# Patient Record
Sex: FEMALE | Race: WHITE | Employment: OTHER | URBAN - METROPOLITAN AREA
[De-identification: names, ages, dates, MRNs, and addresses within clinical notes are randomized per-mention and may not be internally consistent; named-entity substitution may affect disease eponyms.]

---

## 2017-01-05 RX ORDER — CLONAZEPAM 1 MG/1
1 TABLET ORAL NIGHTLY PRN
Qty: 90 TABLET | Refills: 0 | Status: SHIPPED | OUTPATIENT
Start: 2017-01-05 | End: 2017-04-07 | Stop reason: SDUPTHER

## 2017-02-10 ENCOUNTER — OFFICE VISIT (OUTPATIENT)
Dept: FAMILY MEDICINE CLINIC | Age: 72
End: 2017-02-10

## 2017-02-10 VITALS
WEIGHT: 199 LBS | OXYGEN SATURATION: 97 % | DIASTOLIC BLOOD PRESSURE: 84 MMHG | HEART RATE: 78 BPM | BODY MASS INDEX: 34.43 KG/M2 | SYSTOLIC BLOOD PRESSURE: 125 MMHG

## 2017-02-10 DIAGNOSIS — M15.9 GENERALIZED OSTEOARTHROSIS, INVOLVING MULTIPLE SITES: ICD-10-CM

## 2017-02-10 DIAGNOSIS — I10 ESSENTIAL HYPERTENSION: Primary | ICD-10-CM

## 2017-02-10 PROCEDURE — 3017F COLORECTAL CA SCREEN DOC REV: CPT | Performed by: FAMILY MEDICINE

## 2017-02-10 PROCEDURE — G8399 PT W/DXA RESULTS DOCUMENT: HCPCS | Performed by: FAMILY MEDICINE

## 2017-02-10 PROCEDURE — G8419 CALC BMI OUT NRM PARAM NOF/U: HCPCS | Performed by: FAMILY MEDICINE

## 2017-02-10 PROCEDURE — 3014F SCREEN MAMMO DOC REV: CPT | Performed by: FAMILY MEDICINE

## 2017-02-10 PROCEDURE — 4040F PNEUMOC VAC/ADMIN/RCVD: CPT | Performed by: FAMILY MEDICINE

## 2017-02-10 PROCEDURE — 1090F PRES/ABSN URINE INCON ASSESS: CPT | Performed by: FAMILY MEDICINE

## 2017-02-10 PROCEDURE — 1036F TOBACCO NON-USER: CPT | Performed by: FAMILY MEDICINE

## 2017-02-10 PROCEDURE — 1123F ACP DISCUSS/DSCN MKR DOCD: CPT | Performed by: FAMILY MEDICINE

## 2017-02-10 PROCEDURE — G8484 FLU IMMUNIZE NO ADMIN: HCPCS | Performed by: FAMILY MEDICINE

## 2017-02-10 PROCEDURE — 99213 OFFICE O/P EST LOW 20 MIN: CPT | Performed by: FAMILY MEDICINE

## 2017-02-10 PROCEDURE — G8427 DOCREV CUR MEDS BY ELIG CLIN: HCPCS | Performed by: FAMILY MEDICINE

## 2017-02-10 ASSESSMENT — ENCOUNTER SYMPTOMS
BACK PAIN: 0
DIARRHEA: 0
ABDOMINAL PAIN: 0
SHORTNESS OF BREATH: 0
CONSTIPATION: 0

## 2017-02-10 ASSESSMENT — PATIENT HEALTH QUESTIONNAIRE - PHQ9
2. FEELING DOWN, DEPRESSED OR HOPELESS: 0
SUM OF ALL RESPONSES TO PHQ9 QUESTIONS 1 & 2: 0
1. LITTLE INTEREST OR PLEASURE IN DOING THINGS: 0
SUM OF ALL RESPONSES TO PHQ QUESTIONS 1-9: 0

## 2017-03-03 LAB
BUN BLDV-MCNC: 16 MG/DL (ref 8–26)
CALCIUM SERPL-MCNC: 10.1 MG/DL (ref 8.8–10.1)
CHLORIDE BLD-SCNC: 102 MEQ/L (ref 101–111)
CO2: 24 MEQ/L (ref 22–29)
CREAT SERPL-MCNC: 0.67 MG/DL (ref 0.44–1.03)
ESTIMATED AVERAGE GLUCOSE: 117 MG/DL
GFR AFRICAN AMERICAN: >60 ML/MIN/1.73 SQ METER
GFR NON-AFRICAN AMERICAN: >60 ML/MIN/1.73 SQ METER
GLUCOSE BLD-MCNC: 96 MG/DL (ref 70–99)
HBA1C MFR BLD: 5.7 % (ref 4–6)
OSMOLALITY CALCULATION: 268 MOSM/KG (ref 280–300)
POTASSIUM SERPL-SCNC: 3.8 MEQ/L (ref 3.6–5.1)
SODIUM BLD-SCNC: 138 MEQ/L (ref 135–145)

## 2017-03-27 ENCOUNTER — TELEPHONE (OUTPATIENT)
Dept: FAMILY MEDICINE CLINIC | Age: 72
End: 2017-03-27

## 2017-03-27 RX ORDER — MELOXICAM 15 MG/1
15 TABLET ORAL DAILY
Qty: 30 TABLET | Refills: 1 | Status: SHIPPED | OUTPATIENT
Start: 2017-03-27 | End: 2017-04-24 | Stop reason: SDUPTHER

## 2017-04-07 RX ORDER — CLONAZEPAM 1 MG/1
1 TABLET ORAL NIGHTLY PRN
Qty: 90 TABLET | Refills: 0 | OUTPATIENT
Start: 2017-04-07 | End: 2017-04-12 | Stop reason: SDUPTHER

## 2017-04-12 RX ORDER — CLONAZEPAM 1 MG/1
1 TABLET ORAL NIGHTLY PRN
Qty: 90 TABLET | Refills: 0 | Status: SHIPPED | OUTPATIENT
Start: 2017-04-12 | End: 2017-07-28 | Stop reason: SDUPTHER

## 2017-04-24 RX ORDER — MELOXICAM 15 MG/1
15 TABLET ORAL DAILY
Qty: 30 TABLET | Refills: 2 | Status: SHIPPED | OUTPATIENT
Start: 2017-04-24 | End: 2017-04-26 | Stop reason: SDUPTHER

## 2017-04-26 ENCOUNTER — TELEPHONE (OUTPATIENT)
Dept: FAMILY MEDICINE CLINIC | Age: 72
End: 2017-04-26

## 2017-04-26 RX ORDER — MELOXICAM 15 MG/1
15 TABLET ORAL DAILY
Qty: 30 TABLET | Refills: 2 | Status: SHIPPED | OUTPATIENT
Start: 2017-04-26 | End: 2017-10-27 | Stop reason: SDUPTHER

## 2017-05-04 LAB
AVERAGE GLUCOSE: NORMAL
HBA1C MFR BLD: 5.6 %

## 2017-05-10 ENCOUNTER — OFFICE VISIT (OUTPATIENT)
Dept: FAMILY MEDICINE CLINIC | Age: 72
End: 2017-05-10

## 2017-05-10 VITALS
HEART RATE: 66 BPM | WEIGHT: 187 LBS | BODY MASS INDEX: 31.92 KG/M2 | DIASTOLIC BLOOD PRESSURE: 82 MMHG | OXYGEN SATURATION: 96 % | SYSTOLIC BLOOD PRESSURE: 128 MMHG | HEIGHT: 64 IN

## 2017-05-10 DIAGNOSIS — Z01.818 PRE-OP EVALUATION: Primary | ICD-10-CM

## 2017-05-10 DIAGNOSIS — M15.9 GENERALIZED OSTEOARTHROSIS, INVOLVING MULTIPLE SITES: ICD-10-CM

## 2017-05-10 DIAGNOSIS — I10 ESSENTIAL HYPERTENSION: ICD-10-CM

## 2017-05-10 DIAGNOSIS — Z85.3 PERSONAL HISTORY OF MALIGNANT NEOPLASM OF BREAST: ICD-10-CM

## 2017-05-10 PROCEDURE — G8399 PT W/DXA RESULTS DOCUMENT: HCPCS | Performed by: FAMILY MEDICINE

## 2017-05-10 PROCEDURE — 1123F ACP DISCUSS/DSCN MKR DOCD: CPT | Performed by: FAMILY MEDICINE

## 2017-05-10 PROCEDURE — 99214 OFFICE O/P EST MOD 30 MIN: CPT | Performed by: FAMILY MEDICINE

## 2017-05-10 PROCEDURE — G8427 DOCREV CUR MEDS BY ELIG CLIN: HCPCS | Performed by: FAMILY MEDICINE

## 2017-05-10 PROCEDURE — G8417 CALC BMI ABV UP PARAM F/U: HCPCS | Performed by: FAMILY MEDICINE

## 2017-05-10 PROCEDURE — 1090F PRES/ABSN URINE INCON ASSESS: CPT | Performed by: FAMILY MEDICINE

## 2017-05-10 PROCEDURE — 3014F SCREEN MAMMO DOC REV: CPT | Performed by: FAMILY MEDICINE

## 2017-05-10 PROCEDURE — 1036F TOBACCO NON-USER: CPT | Performed by: FAMILY MEDICINE

## 2017-05-10 PROCEDURE — 4040F PNEUMOC VAC/ADMIN/RCVD: CPT | Performed by: FAMILY MEDICINE

## 2017-05-10 PROCEDURE — 3017F COLORECTAL CA SCREEN DOC REV: CPT | Performed by: FAMILY MEDICINE

## 2017-05-12 ENCOUNTER — OFFICE VISIT (OUTPATIENT)
Dept: FAMILY MEDICINE CLINIC | Age: 72
End: 2017-05-12

## 2017-05-12 VITALS
TEMPERATURE: 100.1 F | HEART RATE: 72 BPM | WEIGHT: 187 LBS | SYSTOLIC BLOOD PRESSURE: 140 MMHG | OXYGEN SATURATION: 95 % | DIASTOLIC BLOOD PRESSURE: 90 MMHG | BODY MASS INDEX: 32.35 KG/M2

## 2017-05-12 DIAGNOSIS — J06.9 UPPER RESPIRATORY TRACT INFECTION, UNSPECIFIED TYPE: Primary | ICD-10-CM

## 2017-05-12 PROCEDURE — 99213 OFFICE O/P EST LOW 20 MIN: CPT | Performed by: FAMILY MEDICINE

## 2017-05-12 PROCEDURE — 4040F PNEUMOC VAC/ADMIN/RCVD: CPT | Performed by: FAMILY MEDICINE

## 2017-05-12 PROCEDURE — 1036F TOBACCO NON-USER: CPT | Performed by: FAMILY MEDICINE

## 2017-05-12 PROCEDURE — G8417 CALC BMI ABV UP PARAM F/U: HCPCS | Performed by: FAMILY MEDICINE

## 2017-05-12 PROCEDURE — G8427 DOCREV CUR MEDS BY ELIG CLIN: HCPCS | Performed by: FAMILY MEDICINE

## 2017-05-12 PROCEDURE — 1123F ACP DISCUSS/DSCN MKR DOCD: CPT | Performed by: FAMILY MEDICINE

## 2017-05-12 PROCEDURE — 3014F SCREEN MAMMO DOC REV: CPT | Performed by: FAMILY MEDICINE

## 2017-05-12 PROCEDURE — 1090F PRES/ABSN URINE INCON ASSESS: CPT | Performed by: FAMILY MEDICINE

## 2017-05-12 PROCEDURE — G8399 PT W/DXA RESULTS DOCUMENT: HCPCS | Performed by: FAMILY MEDICINE

## 2017-05-12 PROCEDURE — 3017F COLORECTAL CA SCREEN DOC REV: CPT | Performed by: FAMILY MEDICINE

## 2017-05-12 ASSESSMENT — ENCOUNTER SYMPTOMS: COUGH: 1

## 2017-07-10 RX ORDER — ATENOLOL 100 MG/1
TABLET ORAL
Qty: 90 TABLET | Refills: 0 | Status: SHIPPED | OUTPATIENT
Start: 2017-07-10 | End: 2017-10-05 | Stop reason: SDUPTHER

## 2017-07-28 ENCOUNTER — OFFICE VISIT (OUTPATIENT)
Dept: FAMILY MEDICINE CLINIC | Age: 72
End: 2017-07-28

## 2017-07-28 VITALS
WEIGHT: 187 LBS | DIASTOLIC BLOOD PRESSURE: 86 MMHG | OXYGEN SATURATION: 96 % | BODY MASS INDEX: 32.35 KG/M2 | HEART RATE: 68 BPM | SYSTOLIC BLOOD PRESSURE: 126 MMHG

## 2017-07-28 DIAGNOSIS — R53.1 WEAKNESS: Primary | ICD-10-CM

## 2017-07-28 DIAGNOSIS — R53.83 FATIGUE, UNSPECIFIED TYPE: ICD-10-CM

## 2017-07-28 DIAGNOSIS — R29.898 WEAKNESS OF LEFT LEG: ICD-10-CM

## 2017-07-28 DIAGNOSIS — I10 ESSENTIAL HYPERTENSION: ICD-10-CM

## 2017-07-28 PROCEDURE — G8427 DOCREV CUR MEDS BY ELIG CLIN: HCPCS | Performed by: FAMILY MEDICINE

## 2017-07-28 PROCEDURE — G8399 PT W/DXA RESULTS DOCUMENT: HCPCS | Performed by: FAMILY MEDICINE

## 2017-07-28 PROCEDURE — 4040F PNEUMOC VAC/ADMIN/RCVD: CPT | Performed by: FAMILY MEDICINE

## 2017-07-28 PROCEDURE — 1036F TOBACCO NON-USER: CPT | Performed by: FAMILY MEDICINE

## 2017-07-28 PROCEDURE — 3017F COLORECTAL CA SCREEN DOC REV: CPT | Performed by: FAMILY MEDICINE

## 2017-07-28 PROCEDURE — G8417 CALC BMI ABV UP PARAM F/U: HCPCS | Performed by: FAMILY MEDICINE

## 2017-07-28 PROCEDURE — 1090F PRES/ABSN URINE INCON ASSESS: CPT | Performed by: FAMILY MEDICINE

## 2017-07-28 PROCEDURE — 1123F ACP DISCUSS/DSCN MKR DOCD: CPT | Performed by: FAMILY MEDICINE

## 2017-07-28 PROCEDURE — 3014F SCREEN MAMMO DOC REV: CPT | Performed by: FAMILY MEDICINE

## 2017-07-28 PROCEDURE — 99214 OFFICE O/P EST MOD 30 MIN: CPT | Performed by: FAMILY MEDICINE

## 2017-07-28 RX ORDER — CLONAZEPAM 1 MG/1
1 TABLET ORAL NIGHTLY PRN
Qty: 90 TABLET | Refills: 0 | Status: SHIPPED | OUTPATIENT
Start: 2017-07-28 | End: 2017-10-27 | Stop reason: SDUPTHER

## 2017-07-28 ASSESSMENT — ENCOUNTER SYMPTOMS
ABDOMINAL PAIN: 0
DIARRHEA: 0
SHORTNESS OF BREATH: 0
BACK PAIN: 0
CONSTIPATION: 0

## 2017-08-07 LAB
ALBUMIN SERPL-MCNC: 4.3 G/DL (ref 3.5–5)
ALP BLD-CCNC: 73 IU/L (ref 35–104)
ALT SERPL-CCNC: 12 IU/L (ref 10–35)
AST SERPL-CCNC: 19 IU/L (ref 10–35)
BASOPHILS ABSOLUTE: 0 %
BASOPHILS ABSOLUTE: 0 THOU/MCL (ref 0.01–0.07)
BILIRUB SERPL-MCNC: 0.6 MG/DL (ref 0–1)
BUN BLDV-MCNC: 16 MG/DL (ref 8–26)
CALCIUM SERPL-MCNC: 9.4 MG/DL (ref 8.8–10.1)
CHLORIDE BLD-SCNC: 105 MEQ/L (ref 101–111)
CO2: 23 MEQ/L (ref 22–29)
CREAT SERPL-MCNC: 0.64 MG/DL (ref 0.44–1.03)
EOSINOPHILS ABSOLUTE: 0.1 THOU/MCL (ref 0.03–0.45)
EOSINOPHILS RELATIVE PERCENT: 2 %
GFR AFRICAN AMERICAN: >60 ML/MIN/1.73 SQ METER
GFR NON-AFRICAN AMERICAN: >60 ML/MIN/1.73 SQ METER
GLUCOSE BLD-MCNC: 98 MG/DL (ref 70–99)
HCT VFR BLD CALC: 40 % (ref 36–46)
HEMOGLOBIN: 13.4 G/DL (ref 12–15.2)
LYMPHOCYTES ABSOLUTE: 2.3 THOU/MCL (ref 1–4)
LYMPHOCYTES RELATIVE PERCENT: 35 %
MCH RBC QN AUTO: 29 PG (ref 27–33)
MCHC RBC AUTO-ENTMCNC: 34 G/DL (ref 32–36)
MCV RBC AUTO: 86 FL (ref 82–97)
MONOCYTES # BLD: 7 %
MONOCYTES ABSOLUTE: 0.5 THOU/MCL (ref 0.2–0.9)
NEUTROPHILS ABSOLUTE: 3.6 THOU/MCL (ref 1.8–7.7)
PDW BLD-RTO: 14 %
PLATELET # BLD: 255 THOU/MCL (ref 140–375)
POTASSIUM SERPL-SCNC: 4.3 MEQ/L (ref 3.6–5.1)
RBC # BLD: 4.62 MIL/MCL (ref 3.8–5.2)
SEDIMENTATION RATE, ERYTHROCYTE: 25 MM/HR (ref 0–30)
SEG NEUTROPHILS: 56 %
SODIUM BLD-SCNC: 141 MEQ/L (ref 135–145)
TOTAL PROTEIN: 7.4 G/DL (ref 6.6–8.7)
TSH ULTRASENSITIVE: 1.6 MIU/L (ref 0.27–4.2)
WBC # BLD: 6.5 THOU/MCL (ref 3.6–10.5)

## 2017-08-08 ENCOUNTER — HOSPITAL ENCOUNTER (OUTPATIENT)
Dept: MRI IMAGING | Age: 72
Discharge: HOME | End: 2017-08-08
Attending: FAMILY MEDICINE | Admitting: FAMILY MEDICINE

## 2017-08-08 DIAGNOSIS — M62.81 MUSCLE WEAKNESS (GENERALIZED): ICD-10-CM

## 2017-08-08 DIAGNOSIS — R29.898 WEAKNESS OF LEFT LEG: ICD-10-CM

## 2017-09-01 RX ORDER — RALOXIFENE HYDROCHLORIDE 60 MG/1
TABLET, FILM COATED ORAL
Qty: 90 TABLET | Refills: 0 | Status: SHIPPED | OUTPATIENT
Start: 2017-09-01 | End: 2018-01-24 | Stop reason: SDUPTHER

## 2017-10-05 RX ORDER — ATENOLOL 100 MG/1
TABLET ORAL
Qty: 90 TABLET | Refills: 0 | Status: SHIPPED | OUTPATIENT
Start: 2017-10-05 | End: 2017-10-18 | Stop reason: SDUPTHER

## 2017-10-18 RX ORDER — ATENOLOL 100 MG/1
TABLET ORAL
Qty: 90 TABLET | Refills: 0 | Status: SHIPPED | OUTPATIENT
Start: 2017-10-18 | End: 2018-01-24 | Stop reason: SDUPTHER

## 2017-10-27 ENCOUNTER — TELEPHONE (OUTPATIENT)
Dept: FAMILY MEDICINE CLINIC | Age: 72
End: 2017-10-27

## 2017-10-27 ENCOUNTER — OFFICE VISIT (OUTPATIENT)
Dept: FAMILY MEDICINE CLINIC | Age: 72
End: 2017-10-27

## 2017-10-27 VITALS
DIASTOLIC BLOOD PRESSURE: 86 MMHG | OXYGEN SATURATION: 95 % | BODY MASS INDEX: 33.12 KG/M2 | SYSTOLIC BLOOD PRESSURE: 154 MMHG | HEIGHT: 64 IN | HEART RATE: 67 BPM | WEIGHT: 194 LBS

## 2017-10-27 DIAGNOSIS — R29.898 WEAKNESS OF LEFT HIP: ICD-10-CM

## 2017-10-27 DIAGNOSIS — E66.09 CLASS 1 OBESITY DUE TO EXCESS CALORIES WITH SERIOUS COMORBIDITY AND BODY MASS INDEX (BMI) OF 33.0 TO 33.9 IN ADULT: ICD-10-CM

## 2017-10-27 DIAGNOSIS — M15.9 GENERALIZED OSTEOARTHROSIS, INVOLVING MULTIPLE SITES: ICD-10-CM

## 2017-10-27 DIAGNOSIS — I10 ESSENTIAL HYPERTENSION: Primary | ICD-10-CM

## 2017-10-27 PROBLEM — E66.811 CLASS 1 OBESITY DUE TO EXCESS CALORIES WITH SERIOUS COMORBIDITY AND BODY MASS INDEX (BMI) OF 33.0 TO 33.9 IN ADULT: Status: ACTIVE | Noted: 2017-10-27

## 2017-10-27 PROCEDURE — 1036F TOBACCO NON-USER: CPT | Performed by: FAMILY MEDICINE

## 2017-10-27 PROCEDURE — G8399 PT W/DXA RESULTS DOCUMENT: HCPCS | Performed by: FAMILY MEDICINE

## 2017-10-27 PROCEDURE — 1090F PRES/ABSN URINE INCON ASSESS: CPT | Performed by: FAMILY MEDICINE

## 2017-10-27 PROCEDURE — G8417 CALC BMI ABV UP PARAM F/U: HCPCS | Performed by: FAMILY MEDICINE

## 2017-10-27 PROCEDURE — 4040F PNEUMOC VAC/ADMIN/RCVD: CPT | Performed by: FAMILY MEDICINE

## 2017-10-27 PROCEDURE — 3014F SCREEN MAMMO DOC REV: CPT | Performed by: FAMILY MEDICINE

## 2017-10-27 PROCEDURE — G8427 DOCREV CUR MEDS BY ELIG CLIN: HCPCS | Performed by: FAMILY MEDICINE

## 2017-10-27 PROCEDURE — 90662 IIV NO PRSV INCREASED AG IM: CPT | Performed by: FAMILY MEDICINE

## 2017-10-27 PROCEDURE — 3017F COLORECTAL CA SCREEN DOC REV: CPT | Performed by: FAMILY MEDICINE

## 2017-10-27 PROCEDURE — G0008 ADMIN INFLUENZA VIRUS VAC: HCPCS | Performed by: FAMILY MEDICINE

## 2017-10-27 PROCEDURE — G8484 FLU IMMUNIZE NO ADMIN: HCPCS | Performed by: FAMILY MEDICINE

## 2017-10-27 PROCEDURE — 1123F ACP DISCUSS/DSCN MKR DOCD: CPT | Performed by: FAMILY MEDICINE

## 2017-10-27 PROCEDURE — 99214 OFFICE O/P EST MOD 30 MIN: CPT | Performed by: FAMILY MEDICINE

## 2017-10-27 RX ORDER — MELOXICAM 15 MG/1
15 TABLET ORAL DAILY
Qty: 90 TABLET | Refills: 2 | Status: SHIPPED | OUTPATIENT
Start: 2017-10-27 | End: 2018-11-09 | Stop reason: DRUGHIGH

## 2017-10-27 RX ORDER — LISINOPRIL 10 MG/1
10 TABLET ORAL DAILY
Qty: 90 TABLET | Refills: 3 | Status: SHIPPED | OUTPATIENT
Start: 2017-10-27 | End: 2018-01-24 | Stop reason: SDUPTHER

## 2017-10-27 RX ORDER — CLONAZEPAM 1 MG/1
1 TABLET ORAL NIGHTLY PRN
Qty: 90 TABLET | Refills: 0 | Status: SHIPPED | OUTPATIENT
Start: 2017-10-27 | End: 2018-02-21 | Stop reason: SDUPTHER

## 2017-10-27 ASSESSMENT — ENCOUNTER SYMPTOMS
HEARTBURN: 0
COUGH: 0
CONSTIPATION: 0
BACK PAIN: 0
SHORTNESS OF BREATH: 0
ABDOMINAL PAIN: 0
BLURRED VISION: 0

## 2017-10-27 NOTE — PROGRESS NOTES
Vaccine Information Sheet, \"Influenza - Inactivated\"  given to [place] [employer], or parent/legal guardian of  [place] [employer] and verbalized understanding. Patient responses:    Have you ever had a reaction to a flu vaccine? No  Are you able to eat eggs without adverse effects? Yes  Do you have any current illness? No  Have you ever had [place] Syndrome? No    Flu vaccine given per order. Please see immunization tab.

## 2017-10-27 NOTE — PATIENT INSTRUCTIONS
Patient Education        DASH Diet: Care Instructions  Your Care Instructions  The DASH diet is an eating plan that can help lower your blood pressure. DASH stands for Dietary Approaches to Stop Hypertension. Hypertension is high blood pressure. The DASH diet focuses on eating foods that are high in calcium, potassium, and magnesium. These nutrients can lower blood pressure. The foods that are highest in these nutrients are fruits, vegetables, low-fat dairy products, nuts, seeds, and legumes. But taking calcium, potassium, and magnesium supplements instead of eating foods that are high in those nutrients does not have the same effect. The DASH diet also includes whole grains, fish, and poultry. The DASH diet is one of several lifestyle changes your doctor may recommend to lower your high blood pressure. Your doctor may also want you to decrease the amount of sodium in your diet. Lowering sodium while following the DASH diet can lower blood pressure even further than just the DASH diet alone. Follow-up care is a key part of your treatment and safety. Be sure to make and go to all appointments, and call your doctor if you are having problems. It's also a good idea to know your test results and keep a list of the medicines you take. How can you care for yourself at home? Following the DASH diet  · Eat 4 to 5 servings of fruit each day. A serving is 1 medium-sized piece of fruit, ½ cup chopped or canned fruit, 1/4 cup dried fruit, or 4 ounces (½ cup) of fruit juice. Choose fruit more often than fruit juice. · Eat 4 to 5 servings of vegetables each day. A serving is 1 cup of lettuce or raw leafy vegetables, ½ cup of chopped or cooked vegetables, or 4 ounces (½ cup) of vegetable juice. Choose vegetables more often than vegetable juice. · Get 2 to 3 servings of low-fat and fat-free dairy each day. A serving is 8 ounces of milk, 1 cup of yogurt, or 1 ½ ounces of cheese. · Eat 6 to 8 servings of grains each day.  A using beans and peas. Add garbanzo or kidney beans to salads. Make burritos and tacos with mashed membreno beans or black beans. Where can you learn more? Go to [email or web address]. org and sign in to your [employer] account. Enter A628 in the [employer] box to learn more about \"DASH Diet: Care Instructions. \"     If you do not have an account, please click on the \"Sign Up Now\" link. Current as of: April 3, 2017  Content Version: 11.3  © 0334-4464 [employer]. Care instructions adapted under license by [facility] ([facility]). If you have questions about a medical condition or this instruction, always ask your healthcare professional. [address] any warranty or liability for your use of this information.

## 2018-01-24 ENCOUNTER — OFFICE VISIT (OUTPATIENT)
Dept: FAMILY MEDICINE CLINIC | Age: 73
End: 2018-01-24

## 2018-01-24 VITALS
DIASTOLIC BLOOD PRESSURE: 85 MMHG | WEIGHT: 194 LBS | SYSTOLIC BLOOD PRESSURE: 145 MMHG | OXYGEN SATURATION: 96 % | HEART RATE: 70 BPM | BODY MASS INDEX: 33.56 KG/M2

## 2018-01-24 DIAGNOSIS — I10 ESSENTIAL HYPERTENSION: Primary | ICD-10-CM

## 2018-01-24 DIAGNOSIS — F51.01 PRIMARY INSOMNIA: ICD-10-CM

## 2018-01-24 DIAGNOSIS — M15.9 GENERALIZED OSTEOARTHROSIS, INVOLVING MULTIPLE SITES: ICD-10-CM

## 2018-01-24 PROCEDURE — 1123F ACP DISCUSS/DSCN MKR DOCD: CPT | Performed by: FAMILY MEDICINE

## 2018-01-24 PROCEDURE — 3017F COLORECTAL CA SCREEN DOC REV: CPT | Performed by: FAMILY MEDICINE

## 2018-01-24 PROCEDURE — 4040F PNEUMOC VAC/ADMIN/RCVD: CPT | Performed by: FAMILY MEDICINE

## 2018-01-24 PROCEDURE — G8484 FLU IMMUNIZE NO ADMIN: HCPCS | Performed by: FAMILY MEDICINE

## 2018-01-24 PROCEDURE — 99214 OFFICE O/P EST MOD 30 MIN: CPT | Performed by: FAMILY MEDICINE

## 2018-01-24 PROCEDURE — 3014F SCREEN MAMMO DOC REV: CPT | Performed by: FAMILY MEDICINE

## 2018-01-24 PROCEDURE — G8399 PT W/DXA RESULTS DOCUMENT: HCPCS | Performed by: FAMILY MEDICINE

## 2018-01-24 PROCEDURE — G8417 CALC BMI ABV UP PARAM F/U: HCPCS | Performed by: FAMILY MEDICINE

## 2018-01-24 PROCEDURE — 1090F PRES/ABSN URINE INCON ASSESS: CPT | Performed by: FAMILY MEDICINE

## 2018-01-24 PROCEDURE — G8427 DOCREV CUR MEDS BY ELIG CLIN: HCPCS | Performed by: FAMILY MEDICINE

## 2018-01-24 PROCEDURE — 1036F TOBACCO NON-USER: CPT | Performed by: FAMILY MEDICINE

## 2018-01-24 RX ORDER — LISINOPRIL 20 MG/1
20 TABLET ORAL DAILY
Qty: 90 TABLET | Refills: 3 | Status: SHIPPED | OUTPATIENT
Start: 2018-01-24 | End: 2018-04-24 | Stop reason: SDUPTHER

## 2018-01-24 RX ORDER — ATENOLOL 100 MG/1
TABLET ORAL
Qty: 90 TABLET | Refills: 3 | Status: SHIPPED | OUTPATIENT
Start: 2018-01-24 | End: 2019-04-21 | Stop reason: SDUPTHER

## 2018-01-24 RX ORDER — RALOXIFENE HYDROCHLORIDE 60 MG/1
TABLET, FILM COATED ORAL
Qty: 90 TABLET | Refills: 3 | Status: SHIPPED | OUTPATIENT
Start: 2018-01-24 | End: 2019-02-22 | Stop reason: SDUPTHER

## 2018-01-24 NOTE — PROGRESS NOTES
Chief Complaint   Patient presents with    Hypertension    Hyperlipidemia    Insomnia    Arthritis        Electronically signed by Clarissa Hanna MA on 1/24/2018 at 9:55 AM       Patient is here for follow-up of the following problems:    Chief Complaint   Patient presents with    Hypertension    Hyperlipidemia    Insomnia    Arthritis      Patient is complaining of more pain in her right knee. She has noted this for the last 2 weeks primarily on the inside of the knee. Also has a lot of pain at night in the knee. She does have stairs to go up and down at times. She is still recovering from a replacement of her left total knee replacement about 8 months ago. She has had difficulty losing weight. Needs a new breast surgeon apparently Dr. Gela Barron is retired. Treatment Adherence:   Medication compliance:  compliant all of the time  Diet compliance:  compliant most of the time  Weight trend: stable  Current exercise: no regular exercise  Barriers: Arthritis knee    Hypertension:  Home blood pressure monitoring: No. Patient denies chest pain and shortness of breath. Antihypertensive medication side effects: no medication side effects noted. Use of agents associated with hypertension: none. Sodium (mEq/L)   Date Value   08/07/2017 141    BUN (mg/dL)   Date Value   08/07/2017 16    Glucose (mg/dL)   Date Value   08/07/2017 98      Potassium (mEq/L)   Date Value   08/07/2017 4.3    CREATININE (mg/dL)   Date Value   08/07/2017 0.64         Taking clonazepam at night for sleep at times.   OARRS report accessed and reviewed     Lab Results   Component Value Date    CHOL 239 (H) 12/07/2015    TRIG 207 (H) 12/07/2015    HDL 68 12/07/2015    LDLCALC 130 (H) 12/07/2015     Lab Results   Component Value Date    ALT 12 08/07/2017    AST 19 08/07/2017           Current Outpatient Prescriptions on File Prior to Visit   Medication Sig Dispense Refill    clonazePAM (KLONOPIN) 1 MG tablet Take 1 tablet by mouth nightly as needed for Anxiety (sleep) 90 tablet 0    meloxicam (MOBIC) 15 MG tablet Take 1 tablet by mouth daily 90 tablet 2    acetaminophen (TYLENOL ARTHRITIS PAIN) 650 MG CR tablet Take 650 mg by mouth daily as needed for Pain      Cholecalciferol (VITAMIN D3) 2000 UNITS CAPS Take  by mouth.  calcium carbonate-vitamin D (CALCIUM 600 + D) 600-400 MG-UNIT TABS per tab Take 1 tablet by mouth daily. No current facility-administered medications on file prior to visit. ROS     Social History     Social History    Marital status:      Spouse name: N/A    Number of children: N/A    Years of education: N/A     Occupational History    Not on file. Social History Main Topics    Smoking status: Never Smoker    Smokeless tobacco: Never Used    Alcohol use No    Drug use: No    Sexual activity: Not on file     Other Topics Concern    Not on file     Social History Narrative    No narrative on file        Physical Exam   Constitutional: She is oriented to person, place, and time. She appears well-developed and well-nourished. Cardiovascular: Normal rate, regular rhythm and normal heart sounds. Pulmonary/Chest: Effort normal and breath sounds normal.   Abdominal: Soft. She exhibits no mass. There is no tenderness. Musculoskeletal:        Left hip: She exhibits decreased strength (flexion). Right knee: She exhibits normal range of motion, no swelling and no effusion. Left knee: She exhibits normal range of motion, no swelling and no effusion. Neurological: She is alert and oriented to person, place, and time. Haile Phillips was seen today for hypertension, hyperlipidemia, insomnia and arthritis.     Diagnoses and all orders for this visit:    Essential hypertension    Generalized osteoarthrosis, involving multiple sites    Primary insomnia    Other orders  -     atenolol (TENORMIN) 100 MG tablet; TAKE 1 TABLET BY MOUTH EVERY DAY  -

## 2018-02-07 ENCOUNTER — OFFICE VISIT (OUTPATIENT)
Dept: FAMILY MEDICINE CLINIC | Age: 73
End: 2018-02-07

## 2018-02-07 VITALS — WEIGHT: 194 LBS | DIASTOLIC BLOOD PRESSURE: 70 MMHG | BODY MASS INDEX: 33.56 KG/M2 | SYSTOLIC BLOOD PRESSURE: 134 MMHG

## 2018-02-07 DIAGNOSIS — M17.11 ARTHRITIS OF RIGHT KNEE: Primary | ICD-10-CM

## 2018-02-07 PROCEDURE — 20610 DRAIN/INJ JOINT/BURSA W/O US: CPT | Performed by: FAMILY MEDICINE

## 2018-02-07 PROCEDURE — 99999 PR OFFICE/OUTPT VISIT,PROCEDURE ONLY: CPT | Performed by: FAMILY MEDICINE

## 2018-02-07 RX ORDER — TRIAMCINOLONE ACETONIDE 40 MG/ML
40 INJECTION, SUSPENSION INTRA-ARTICULAR; INTRAMUSCULAR ONCE
Status: COMPLETED | OUTPATIENT
Start: 2018-02-07 | End: 2018-02-07

## 2018-02-07 RX ADMIN — TRIAMCINOLONE ACETONIDE 40 MG: 40 INJECTION, SUSPENSION INTRA-ARTICULAR; INTRAMUSCULAR at 11:35

## 2018-02-07 NOTE — PROGRESS NOTES
Subjective:      Patient ID: Yunior Echols is a 74 y.o. female. HPI Patient is here for right knee pain. Has had increased pain right knee for 2 weeks, worse after trip to [facility]   pain medial knee and below the knee   has pain and spasm, using meloxicam and topical meds and tylenol   pain is worse when up on leg     Current Outpatient Prescriptions on File Prior to Visit   Medication Sig Dispense Refill    atenolol (TENORMIN) 100 MG tablet TAKE 1 TABLET BY MOUTH EVERY DAY 90 tablet 3    raloxifene (EVISTA) 60 MG tablet TAKE 1 TABLET DAILY 90 tablet 3    lisinopril (PRINIVIL;ZESTRIL) 20 MG tablet Take 1 tablet by mouth daily 90 tablet 3    clonazePAM (KLONOPIN) 1 MG tablet Take 1 tablet by mouth nightly as needed for Anxiety (sleep) 90 tablet 0    meloxicam (MOBIC) 15 MG tablet Take 1 tablet by mouth daily 90 tablet 2    acetaminophen (TYLENOL ARTHRITIS PAIN) 650 MG CR tablet Take 650 mg by mouth daily as needed for Pain      Cholecalciferol (VITAMIN D3) 2000 UNITS CAPS Take  by mouth.  calcium carbonate-vitamin D (CALCIUM 600 + D) 600-400 MG-UNIT TABS per tab Take 1 tablet by mouth daily. No current facility-administered medications on file prior to visit. Review of Systems    Objective:   Physical Exam   Constitutional: She is oriented to person, place, and time. She appears well-developed and well-nourished. Musculoskeletal:        Right knee: She exhibits swelling (medial and inferior to knee). She exhibits no effusion, no deformity, no erythema and normal alignment. Neurological: She is alert and oriented to person, place, and time. Assessment:      1. Arthritis of right knee  VA ARTHROCENTESIS ASPIR&/INJ MAJOR JT/BURSA W/O US          Plan:      Inject right knee  Arthrocentesis Procedure Note    Pre-operative Diagnosis:   1.  Arthritis of right knee        Post-operative Diagnosis: same    Locations:right knee    Procedure Details   After consent was obtained, using

## 2018-02-09 ENCOUNTER — TELEPHONE (OUTPATIENT)
Dept: FAMILY MEDICINE CLINIC | Age: 73
End: 2018-02-09

## 2018-02-21 RX ORDER — CLONAZEPAM 1 MG/1
TABLET ORAL
Qty: 90 TABLET | Refills: 0 | Status: SHIPPED | OUTPATIENT
Start: 2018-02-21 | End: 2018-07-25 | Stop reason: SDUPTHER

## 2018-03-02 ENCOUNTER — HOSPITAL ENCOUNTER (OUTPATIENT)
Dept: OTHER | Age: 73
Discharge: HOME | End: 2018-03-02
Attending: SURGERY | Admitting: SURGERY

## 2018-03-02 ENCOUNTER — OFFICE VISIT (OUTPATIENT)
Dept: SURGERY | Age: 73
End: 2018-03-02

## 2018-03-02 VITALS
HEIGHT: 64 IN | BODY MASS INDEX: 32.95 KG/M2 | HEART RATE: 62 BPM | TEMPERATURE: 97.5 F | WEIGHT: 193 LBS | SYSTOLIC BLOOD PRESSURE: 144 MMHG | DIASTOLIC BLOOD PRESSURE: 87 MMHG

## 2018-03-02 DIAGNOSIS — Z85.3 PERSONAL HISTORY OF BREAST CANCER: ICD-10-CM

## 2018-03-02 DIAGNOSIS — Z85.3 PERSONAL HISTORY OF BREAST CANCER: Primary | ICD-10-CM

## 2018-03-02 DIAGNOSIS — R92.1 BREAST CALCIFICATIONS: ICD-10-CM

## 2018-03-02 DIAGNOSIS — Z12.31 SCREENING MAMMOGRAM, ENCOUNTER FOR: ICD-10-CM

## 2018-03-02 PROCEDURE — 99203 OFFICE O/P NEW LOW 30 MIN: CPT | Performed by: SURGERY

## 2018-03-02 PROCEDURE — G8399 PT W/DXA RESULTS DOCUMENT: HCPCS | Performed by: SURGERY

## 2018-03-02 PROCEDURE — G8484 FLU IMMUNIZE NO ADMIN: HCPCS | Performed by: SURGERY

## 2018-03-02 PROCEDURE — 1036F TOBACCO NON-USER: CPT | Performed by: SURGERY

## 2018-03-02 PROCEDURE — 1090F PRES/ABSN URINE INCON ASSESS: CPT | Performed by: SURGERY

## 2018-03-02 PROCEDURE — G8428 CUR MEDS NOT DOCUMENT: HCPCS | Performed by: SURGERY

## 2018-03-02 PROCEDURE — 3014F SCREEN MAMMO DOC REV: CPT | Performed by: SURGERY

## 2018-03-02 PROCEDURE — G8417 CALC BMI ABV UP PARAM F/U: HCPCS | Performed by: SURGERY

## 2018-03-02 PROCEDURE — 3017F COLORECTAL CA SCREEN DOC REV: CPT | Performed by: SURGERY

## 2018-03-02 PROCEDURE — 4040F PNEUMOC VAC/ADMIN/RCVD: CPT | Performed by: SURGERY

## 2018-03-02 PROCEDURE — 1123F ACP DISCUSS/DSCN MKR DOCD: CPT | Performed by: SURGERY

## 2018-03-02 RX ORDER — LORATADINE 10 MG/1
10 TABLET ORAL
COMMUNITY
End: 2019-02-22 | Stop reason: ALTCHOICE

## 2018-03-02 RX ORDER — PREDNISOLONE ACETATE 10 MG/ML
SUSPENSION/ DROPS OPHTHALMIC
Refills: 1 | COMMUNITY
Start: 2018-02-07 | End: 2018-04-24

## 2018-03-02 RX ORDER — POLYMYXIN B SULFATE AND TRIMETHOPRIM 1; 10000 MG/ML; [USP'U]/ML
SOLUTION OPHTHALMIC
Refills: 1 | COMMUNITY
Start: 2018-02-07 | End: 2018-04-24

## 2018-03-02 ASSESSMENT — ENCOUNTER SYMPTOMS
RESPIRATORY NEGATIVE: 1
EYES NEGATIVE: 1
GASTROINTESTINAL NEGATIVE: 1

## 2018-03-09 ENCOUNTER — OFFICE VISIT (OUTPATIENT)
Dept: FAMILY MEDICINE CLINIC | Age: 73
End: 2018-03-09

## 2018-03-09 ENCOUNTER — TELEPHONE (OUTPATIENT)
Dept: SURGERY | Age: 73
End: 2018-03-09

## 2018-03-09 VITALS
BODY MASS INDEX: 32.95 KG/M2 | DIASTOLIC BLOOD PRESSURE: 80 MMHG | WEIGHT: 193 LBS | OXYGEN SATURATION: 98 % | HEART RATE: 67 BPM | HEIGHT: 64 IN | SYSTOLIC BLOOD PRESSURE: 120 MMHG

## 2018-03-09 DIAGNOSIS — Z01.818 PRE-OP EVALUATION: Primary | ICD-10-CM

## 2018-03-09 DIAGNOSIS — I10 ESSENTIAL HYPERTENSION: ICD-10-CM

## 2018-03-09 DIAGNOSIS — E78.00 PURE HYPERCHOLESTEROLEMIA: ICD-10-CM

## 2018-03-09 DIAGNOSIS — F51.01 PRIMARY INSOMNIA: ICD-10-CM

## 2018-03-09 PROCEDURE — G8482 FLU IMMUNIZE ORDER/ADMIN: HCPCS | Performed by: FAMILY MEDICINE

## 2018-03-09 PROCEDURE — 1123F ACP DISCUSS/DSCN MKR DOCD: CPT | Performed by: FAMILY MEDICINE

## 2018-03-09 PROCEDURE — 4040F PNEUMOC VAC/ADMIN/RCVD: CPT | Performed by: FAMILY MEDICINE

## 2018-03-09 PROCEDURE — 99213 OFFICE O/P EST LOW 20 MIN: CPT | Performed by: FAMILY MEDICINE

## 2018-03-09 PROCEDURE — 1090F PRES/ABSN URINE INCON ASSESS: CPT | Performed by: FAMILY MEDICINE

## 2018-03-09 PROCEDURE — G8427 DOCREV CUR MEDS BY ELIG CLIN: HCPCS | Performed by: FAMILY MEDICINE

## 2018-03-09 PROCEDURE — 3014F SCREEN MAMMO DOC REV: CPT | Performed by: FAMILY MEDICINE

## 2018-03-09 PROCEDURE — G8399 PT W/DXA RESULTS DOCUMENT: HCPCS | Performed by: FAMILY MEDICINE

## 2018-03-09 PROCEDURE — G8417 CALC BMI ABV UP PARAM F/U: HCPCS | Performed by: FAMILY MEDICINE

## 2018-03-09 PROCEDURE — 3017F COLORECTAL CA SCREEN DOC REV: CPT | Performed by: FAMILY MEDICINE

## 2018-03-09 PROCEDURE — 1036F TOBACCO NON-USER: CPT | Performed by: FAMILY MEDICINE

## 2018-03-09 PROCEDURE — 93000 ELECTROCARDIOGRAM COMPLETE: CPT | Performed by: FAMILY MEDICINE

## 2018-03-09 NOTE — TELEPHONE ENCOUNTER
Pt returning call to [facility] and was told that [facility] would call her back in 5 mins.

## 2018-04-13 RX ORDER — ATENOLOL 100 MG/1
TABLET ORAL
Qty: 90 TABLET | Refills: 1 | Status: SHIPPED | OUTPATIENT
Start: 2018-04-13 | End: 2018-04-24

## 2018-04-24 ENCOUNTER — OFFICE VISIT (OUTPATIENT)
Dept: FAMILY MEDICINE CLINIC | Age: 73
End: 2018-04-24

## 2018-04-24 VITALS
SYSTOLIC BLOOD PRESSURE: 150 MMHG | OXYGEN SATURATION: 97 % | WEIGHT: 198 LBS | DIASTOLIC BLOOD PRESSURE: 90 MMHG | HEART RATE: 74 BPM | BODY MASS INDEX: 34.52 KG/M2

## 2018-04-24 DIAGNOSIS — E83.52 HYPERCALCEMIA: ICD-10-CM

## 2018-04-24 DIAGNOSIS — F51.01 PRIMARY INSOMNIA: ICD-10-CM

## 2018-04-24 DIAGNOSIS — I10 ESSENTIAL HYPERTENSION: Primary | ICD-10-CM

## 2018-04-24 DIAGNOSIS — M15.9 GENERALIZED OSTEOARTHROSIS, INVOLVING MULTIPLE SITES: ICD-10-CM

## 2018-04-24 PROCEDURE — 1090F PRES/ABSN URINE INCON ASSESS: CPT | Performed by: FAMILY MEDICINE

## 2018-04-24 PROCEDURE — G8427 DOCREV CUR MEDS BY ELIG CLIN: HCPCS | Performed by: FAMILY MEDICINE

## 2018-04-24 PROCEDURE — 99214 OFFICE O/P EST MOD 30 MIN: CPT | Performed by: FAMILY MEDICINE

## 2018-04-24 PROCEDURE — 3017F COLORECTAL CA SCREEN DOC REV: CPT | Performed by: FAMILY MEDICINE

## 2018-04-24 PROCEDURE — G8417 CALC BMI ABV UP PARAM F/U: HCPCS | Performed by: FAMILY MEDICINE

## 2018-04-24 PROCEDURE — 4040F PNEUMOC VAC/ADMIN/RCVD: CPT | Performed by: FAMILY MEDICINE

## 2018-04-24 PROCEDURE — 1036F TOBACCO NON-USER: CPT | Performed by: FAMILY MEDICINE

## 2018-04-24 PROCEDURE — 1123F ACP DISCUSS/DSCN MKR DOCD: CPT | Performed by: FAMILY MEDICINE

## 2018-04-24 PROCEDURE — G8399 PT W/DXA RESULTS DOCUMENT: HCPCS | Performed by: FAMILY MEDICINE

## 2018-04-24 RX ORDER — LISINOPRIL 40 MG/1
40 TABLET ORAL DAILY
Qty: 90 TABLET | Refills: 3 | Status: SHIPPED | OUTPATIENT
Start: 2018-04-24 | End: 2019-05-06 | Stop reason: SDUPTHER

## 2018-04-24 ASSESSMENT — PATIENT HEALTH QUESTIONNAIRE - PHQ9
SUM OF ALL RESPONSES TO PHQ QUESTIONS 1-9: 0
2. FEELING DOWN, DEPRESSED OR HOPELESS: 0
SUM OF ALL RESPONSES TO PHQ9 QUESTIONS 1 & 2: 0
1. LITTLE INTEREST OR PLEASURE IN DOING THINGS: 0

## 2018-05-15 ENCOUNTER — TELEPHONE (OUTPATIENT)
Dept: FAMILY MEDICINE CLINIC | Age: 73
End: 2018-05-15

## 2018-05-15 ENCOUNTER — OFFICE VISIT (OUTPATIENT)
Dept: FAMILY MEDICINE CLINIC | Age: 73
End: 2018-05-15

## 2018-05-15 VITALS
BODY MASS INDEX: 33.2 KG/M2 | TEMPERATURE: 96.8 F | HEART RATE: 81 BPM | WEIGHT: 190.4 LBS | SYSTOLIC BLOOD PRESSURE: 120 MMHG | OXYGEN SATURATION: 95 % | DIASTOLIC BLOOD PRESSURE: 72 MMHG

## 2018-05-15 DIAGNOSIS — J40 BRONCHITIS: Primary | ICD-10-CM

## 2018-05-15 PROCEDURE — G8399 PT W/DXA RESULTS DOCUMENT: HCPCS | Performed by: PHYSICIAN ASSISTANT

## 2018-05-15 PROCEDURE — 1036F TOBACCO NON-USER: CPT | Performed by: PHYSICIAN ASSISTANT

## 2018-05-15 PROCEDURE — G8417 CALC BMI ABV UP PARAM F/U: HCPCS | Performed by: PHYSICIAN ASSISTANT

## 2018-05-15 PROCEDURE — G8427 DOCREV CUR MEDS BY ELIG CLIN: HCPCS | Performed by: PHYSICIAN ASSISTANT

## 2018-05-15 PROCEDURE — 1123F ACP DISCUSS/DSCN MKR DOCD: CPT | Performed by: PHYSICIAN ASSISTANT

## 2018-05-15 PROCEDURE — 1090F PRES/ABSN URINE INCON ASSESS: CPT | Performed by: PHYSICIAN ASSISTANT

## 2018-05-15 PROCEDURE — 4040F PNEUMOC VAC/ADMIN/RCVD: CPT | Performed by: PHYSICIAN ASSISTANT

## 2018-05-15 PROCEDURE — 99213 OFFICE O/P EST LOW 20 MIN: CPT | Performed by: PHYSICIAN ASSISTANT

## 2018-05-15 PROCEDURE — 3017F COLORECTAL CA SCREEN DOC REV: CPT | Performed by: PHYSICIAN ASSISTANT

## 2018-05-15 RX ORDER — LEVOFLOXACIN 500 MG/1
500 TABLET, FILM COATED ORAL DAILY
Qty: 10 TABLET | Refills: 0 | Status: SHIPPED | OUTPATIENT
Start: 2018-05-15 | End: 2018-05-25

## 2018-05-15 RX ORDER — GUAIFENESIN AND CODEINE PHOSPHATE 100; 10 MG/5ML; MG/5ML
5 SOLUTION ORAL EVERY 4 HOURS PRN
Qty: 120 ML | Refills: 0 | Status: SHIPPED | OUTPATIENT
Start: 2018-05-15 | End: 2018-05-15 | Stop reason: SDUPTHER

## 2018-05-15 RX ORDER — GUAIFENESIN AND CODEINE PHOSPHATE 100; 10 MG/5ML; MG/5ML
5 SOLUTION ORAL EVERY 4 HOURS PRN
Qty: 120 ML | Refills: 0 | OUTPATIENT
Start: 2018-05-15 | End: 2018-05-21 | Stop reason: SDUPTHER

## 2018-05-15 ASSESSMENT — ENCOUNTER SYMPTOMS
VOMITING: 0
SINUS PAIN: 1
WHEEZING: 0
TROUBLE SWALLOWING: 0
SORE THROAT: 0
SHORTNESS OF BREATH: 0
SINUS PRESSURE: 1
COUGH: 1
DIARRHEA: 0
NAUSEA: 1

## 2018-05-18 ENCOUNTER — TELEPHONE (OUTPATIENT)
Dept: FAMILY MEDICINE CLINIC | Age: 73
End: 2018-05-18

## 2018-05-21 ENCOUNTER — TELEPHONE (OUTPATIENT)
Dept: FAMILY MEDICINE CLINIC | Age: 73
End: 2018-05-21

## 2018-05-21 DIAGNOSIS — J40 BRONCHITIS: ICD-10-CM

## 2018-05-21 RX ORDER — GUAIFENESIN AND CODEINE PHOSPHATE 100; 10 MG/5ML; MG/5ML
5 SOLUTION ORAL EVERY 4 HOURS PRN
Qty: 120 ML | Refills: 0 | OUTPATIENT
Start: 2018-05-21 | End: 2018-05-28

## 2018-06-13 ENCOUNTER — TELEPHONE (OUTPATIENT)
Dept: FAMILY MEDICINE CLINIC | Age: 73
End: 2018-06-13

## 2018-06-15 ENCOUNTER — TELEPHONE (OUTPATIENT)
Dept: FAMILY MEDICINE CLINIC | Age: 73
End: 2018-06-15

## 2018-06-18 ENCOUNTER — OFFICE VISIT (OUTPATIENT)
Dept: FAMILY MEDICINE CLINIC | Age: 73
End: 2018-06-18

## 2018-06-18 VITALS
BODY MASS INDEX: 33.65 KG/M2 | HEART RATE: 78 BPM | DIASTOLIC BLOOD PRESSURE: 76 MMHG | SYSTOLIC BLOOD PRESSURE: 138 MMHG | OXYGEN SATURATION: 98 % | WEIGHT: 193 LBS

## 2018-06-18 DIAGNOSIS — M17.11 ARTHRITIS OF RIGHT KNEE: Primary | ICD-10-CM

## 2018-06-18 PROCEDURE — 20610 DRAIN/INJ JOINT/BURSA W/O US: CPT | Performed by: FAMILY MEDICINE

## 2018-06-18 RX ORDER — TRIAMCINOLONE ACETONIDE 40 MG/ML
40 INJECTION, SUSPENSION INTRA-ARTICULAR; INTRAMUSCULAR ONCE
Status: COMPLETED | OUTPATIENT
Start: 2018-06-18 | End: 2018-06-18

## 2018-06-18 RX ADMIN — TRIAMCINOLONE ACETONIDE 40 MG: 40 INJECTION, SUSPENSION INTRA-ARTICULAR; INTRAMUSCULAR at 14:05

## 2018-07-25 ENCOUNTER — OFFICE VISIT (OUTPATIENT)
Dept: FAMILY MEDICINE CLINIC | Age: 73
End: 2018-07-25

## 2018-07-25 VITALS
BODY MASS INDEX: 33.65 KG/M2 | OXYGEN SATURATION: 97 % | SYSTOLIC BLOOD PRESSURE: 144 MMHG | WEIGHT: 193 LBS | HEART RATE: 67 BPM | DIASTOLIC BLOOD PRESSURE: 80 MMHG

## 2018-07-25 DIAGNOSIS — M15.9 GENERALIZED OSTEOARTHROSIS, INVOLVING MULTIPLE SITES: ICD-10-CM

## 2018-07-25 DIAGNOSIS — F51.01 PRIMARY INSOMNIA: ICD-10-CM

## 2018-07-25 DIAGNOSIS — I10 ESSENTIAL HYPERTENSION: Primary | ICD-10-CM

## 2018-07-25 PROCEDURE — G8427 DOCREV CUR MEDS BY ELIG CLIN: HCPCS | Performed by: FAMILY MEDICINE

## 2018-07-25 PROCEDURE — 1090F PRES/ABSN URINE INCON ASSESS: CPT | Performed by: FAMILY MEDICINE

## 2018-07-25 PROCEDURE — 1036F TOBACCO NON-USER: CPT | Performed by: FAMILY MEDICINE

## 2018-07-25 PROCEDURE — G8399 PT W/DXA RESULTS DOCUMENT: HCPCS | Performed by: FAMILY MEDICINE

## 2018-07-25 PROCEDURE — 3017F COLORECTAL CA SCREEN DOC REV: CPT | Performed by: FAMILY MEDICINE

## 2018-07-25 PROCEDURE — 4040F PNEUMOC VAC/ADMIN/RCVD: CPT | Performed by: FAMILY MEDICINE

## 2018-07-25 PROCEDURE — 1101F PT FALLS ASSESS-DOCD LE1/YR: CPT | Performed by: FAMILY MEDICINE

## 2018-07-25 PROCEDURE — 1123F ACP DISCUSS/DSCN MKR DOCD: CPT | Performed by: FAMILY MEDICINE

## 2018-07-25 PROCEDURE — 99214 OFFICE O/P EST MOD 30 MIN: CPT | Performed by: FAMILY MEDICINE

## 2018-07-25 PROCEDURE — G8417 CALC BMI ABV UP PARAM F/U: HCPCS | Performed by: FAMILY MEDICINE

## 2018-07-25 RX ORDER — CLONAZEPAM 1 MG/1
TABLET ORAL
Qty: 90 TABLET | Refills: 0 | Status: SHIPPED | OUTPATIENT
Start: 2018-07-25 | End: 2018-10-29 | Stop reason: SDUPTHER

## 2018-07-25 ASSESSMENT — ENCOUNTER SYMPTOMS
BLURRED VISION: 0
COUGH: 0
BACK PAIN: 0
HEARTBURN: 0
CONSTIPATION: 0
SHORTNESS OF BREATH: 0
ABDOMINAL PAIN: 0

## 2018-10-29 ENCOUNTER — OFFICE VISIT (OUTPATIENT)
Dept: FAMILY MEDICINE CLINIC | Age: 73
End: 2018-10-29
Payer: MEDICARE

## 2018-10-29 VITALS
OXYGEN SATURATION: 95 % | SYSTOLIC BLOOD PRESSURE: 154 MMHG | BODY MASS INDEX: 35.22 KG/M2 | WEIGHT: 202 LBS | DIASTOLIC BLOOD PRESSURE: 80 MMHG | HEART RATE: 70 BPM

## 2018-10-29 DIAGNOSIS — M15.9 GENERALIZED OSTEOARTHROSIS, INVOLVING MULTIPLE SITES: ICD-10-CM

## 2018-10-29 DIAGNOSIS — I10 ESSENTIAL HYPERTENSION: Primary | ICD-10-CM

## 2018-10-29 DIAGNOSIS — M54.50 ACUTE RIGHT-SIDED LOW BACK PAIN WITHOUT SCIATICA: ICD-10-CM

## 2018-10-29 DIAGNOSIS — F51.01 PRIMARY INSOMNIA: ICD-10-CM

## 2018-10-29 PROBLEM — R29.898 WEAKNESS OF LEFT HIP: Status: RESOLVED | Noted: 2017-10-27 | Resolved: 2018-10-29

## 2018-10-29 PROCEDURE — 1036F TOBACCO NON-USER: CPT | Performed by: FAMILY MEDICINE

## 2018-10-29 PROCEDURE — 1090F PRES/ABSN URINE INCON ASSESS: CPT | Performed by: FAMILY MEDICINE

## 2018-10-29 PROCEDURE — 90662 IIV NO PRSV INCREASED AG IM: CPT | Performed by: FAMILY MEDICINE

## 2018-10-29 PROCEDURE — 99214 OFFICE O/P EST MOD 30 MIN: CPT | Performed by: FAMILY MEDICINE

## 2018-10-29 PROCEDURE — G8417 CALC BMI ABV UP PARAM F/U: HCPCS | Performed by: FAMILY MEDICINE

## 2018-10-29 PROCEDURE — G0008 ADMIN INFLUENZA VIRUS VAC: HCPCS | Performed by: FAMILY MEDICINE

## 2018-10-29 PROCEDURE — 1101F PT FALLS ASSESS-DOCD LE1/YR: CPT | Performed by: FAMILY MEDICINE

## 2018-10-29 PROCEDURE — G8482 FLU IMMUNIZE ORDER/ADMIN: HCPCS | Performed by: FAMILY MEDICINE

## 2018-10-29 PROCEDURE — G8427 DOCREV CUR MEDS BY ELIG CLIN: HCPCS | Performed by: FAMILY MEDICINE

## 2018-10-29 PROCEDURE — G8399 PT W/DXA RESULTS DOCUMENT: HCPCS | Performed by: FAMILY MEDICINE

## 2018-10-29 PROCEDURE — 3017F COLORECTAL CA SCREEN DOC REV: CPT | Performed by: FAMILY MEDICINE

## 2018-10-29 PROCEDURE — 4040F PNEUMOC VAC/ADMIN/RCVD: CPT | Performed by: FAMILY MEDICINE

## 2018-10-29 PROCEDURE — 1123F ACP DISCUSS/DSCN MKR DOCD: CPT | Performed by: FAMILY MEDICINE

## 2018-10-29 RX ORDER — CLONAZEPAM 1 MG/1
TABLET ORAL
Qty: 90 TABLET | Refills: 0 | Status: SHIPPED | OUTPATIENT
Start: 2018-10-29 | End: 2019-02-22 | Stop reason: SDUPTHER

## 2018-10-29 RX ORDER — HYDROCHLOROTHIAZIDE 25 MG/1
25 TABLET ORAL DAILY
Qty: 90 TABLET | Refills: 3 | Status: SHIPPED | OUTPATIENT
Start: 2018-10-29 | End: 2019-08-30 | Stop reason: SDUPTHER

## 2018-10-29 ASSESSMENT — ENCOUNTER SYMPTOMS
BACK PAIN: 1
CONSTIPATION: 0
ABDOMINAL PAIN: 0
DIARRHEA: 0
SHORTNESS OF BREATH: 0

## 2018-10-29 ASSESSMENT — PATIENT HEALTH QUESTIONNAIRE - PHQ9
1. LITTLE INTEREST OR PLEASURE IN DOING THINGS: 0
SUM OF ALL RESPONSES TO PHQ QUESTIONS 1-9: 0
SUM OF ALL RESPONSES TO PHQ QUESTIONS 1-9: 0
2. FEELING DOWN, DEPRESSED OR HOPELESS: 0
SUM OF ALL RESPONSES TO PHQ9 QUESTIONS 1 & 2: 0

## 2018-10-29 NOTE — PROGRESS NOTES
Chief Complaint   Patient presents with    Hypertension    Hyperlipidemia    Insomnia    Arthritis      Patient has complaints of:  Vibrating pain in her right lower back that radiates into her right leg times 1 month    Electronically signed by Filiberto Reed MA on 10/29/2018 at 9:44 AM       Patient is here for follow-up of the following problems:  Patient is here for follow-up of her hypertension arthritis insomnia and stress. She reports that she's had multiple tests and her family. In addition she recently lost her cat.   over the last year. Brothers had some serious illnesses. Patient is use the clonazepam to help her sleep at night. No problems or issues with this. OARRS report accessed and reviewed    She has gained some weight. She complains of pain in the right lower back some radiation at times into her right anterior thigh. She's been taking the meloxicam and Tylenol arthritis with incomplete relief. She has not been getting regular exercise. This is both due to her pain and due to the stress she's had in her life. He has history of breast cancer with mastectomy 10 years ago    Patient nonsmoker    Treatment Adherence:   Medication compliance:  compliant all of the time  Diet compliance:  compliant most of the time  Weight trend: increasing  Current exercise: no regular exercise  Barriers: lack of motivation and stress    Hypertension:  Home blood pressure monitoring: No. Patient denies chest pain and shortness of breath. Antihypertensive medication side effects: no medication side effects noted. Use of agents associated with hypertension: NSAIDS.                                         Sodium (mmol/L)   Date Value   2018 143    BUN (mg/dL)   Date Value   2018 15    Glucose (mg/dL)   Date Value   2018 103 (H)      Potassium (mmol/L)   Date Value   2018 4.5    CREATININE (mg/dL)   Date Value   2018 0.78         Hyperlipidemia:  No meds, has high HDL

## 2018-11-09 DIAGNOSIS — I10 ESSENTIAL HYPERTENSION: Primary | ICD-10-CM

## 2018-11-09 DIAGNOSIS — R79.89 CREATININE ELEVATION: ICD-10-CM

## 2018-11-09 RX ORDER — MELOXICAM 15 MG/1
7.5 TABLET ORAL DAILY
Qty: 90 TABLET | Refills: 2 | Status: SHIPPED | OUTPATIENT
Start: 2018-11-09 | End: 2019-03-07 | Stop reason: SDUPTHER

## 2019-01-23 ENCOUNTER — TELEPHONE (OUTPATIENT)
Dept: FAMILY MEDICINE CLINIC | Age: 74
End: 2019-01-23

## 2019-01-23 DIAGNOSIS — E78.00 PURE HYPERCHOLESTEROLEMIA: ICD-10-CM

## 2019-01-23 DIAGNOSIS — I10 ESSENTIAL HYPERTENSION: Primary | ICD-10-CM

## 2019-01-23 DIAGNOSIS — E66.09 CLASS 1 OBESITY DUE TO EXCESS CALORIES WITH SERIOUS COMORBIDITY AND BODY MASS INDEX (BMI) OF 33.0 TO 33.9 IN ADULT: ICD-10-CM

## 2019-01-28 ENCOUNTER — OFFICE VISIT (OUTPATIENT)
Dept: FAMILY MEDICINE CLINIC | Age: 74
End: 2019-01-28
Payer: MEDICARE

## 2019-01-28 VITALS
WEIGHT: 198 LBS | BODY MASS INDEX: 34.52 KG/M2 | DIASTOLIC BLOOD PRESSURE: 80 MMHG | HEART RATE: 66 BPM | SYSTOLIC BLOOD PRESSURE: 130 MMHG | OXYGEN SATURATION: 96 %

## 2019-01-28 DIAGNOSIS — R07.9 CHEST PAIN, UNSPECIFIED TYPE: Primary | ICD-10-CM

## 2019-01-28 DIAGNOSIS — M85.80 OSTEOPENIA, UNSPECIFIED LOCATION: ICD-10-CM

## 2019-01-28 DIAGNOSIS — E78.00 PURE HYPERCHOLESTEROLEMIA: ICD-10-CM

## 2019-01-28 DIAGNOSIS — Z85.3 PERSONAL HISTORY OF MALIGNANT NEOPLASM OF BREAST: ICD-10-CM

## 2019-01-28 DIAGNOSIS — M15.9 GENERALIZED OSTEOARTHROSIS, INVOLVING MULTIPLE SITES: ICD-10-CM

## 2019-01-28 DIAGNOSIS — I10 ESSENTIAL HYPERTENSION: ICD-10-CM

## 2019-01-28 DIAGNOSIS — B37.31 CANDIDA VAGINITIS: ICD-10-CM

## 2019-01-28 PROCEDURE — 3017F COLORECTAL CA SCREEN DOC REV: CPT | Performed by: FAMILY MEDICINE

## 2019-01-28 PROCEDURE — 99214 OFFICE O/P EST MOD 30 MIN: CPT | Performed by: FAMILY MEDICINE

## 2019-01-28 PROCEDURE — 1123F ACP DISCUSS/DSCN MKR DOCD: CPT | Performed by: FAMILY MEDICINE

## 2019-01-28 PROCEDURE — 4040F PNEUMOC VAC/ADMIN/RCVD: CPT | Performed by: FAMILY MEDICINE

## 2019-01-28 PROCEDURE — 1036F TOBACCO NON-USER: CPT | Performed by: FAMILY MEDICINE

## 2019-01-28 PROCEDURE — 1101F PT FALLS ASSESS-DOCD LE1/YR: CPT | Performed by: FAMILY MEDICINE

## 2019-01-28 PROCEDURE — 1090F PRES/ABSN URINE INCON ASSESS: CPT | Performed by: FAMILY MEDICINE

## 2019-01-28 PROCEDURE — 93000 ELECTROCARDIOGRAM COMPLETE: CPT | Performed by: FAMILY MEDICINE

## 2019-01-28 PROCEDURE — G8417 CALC BMI ABV UP PARAM F/U: HCPCS | Performed by: FAMILY MEDICINE

## 2019-01-28 PROCEDURE — G8482 FLU IMMUNIZE ORDER/ADMIN: HCPCS | Performed by: FAMILY MEDICINE

## 2019-01-28 PROCEDURE — G8399 PT W/DXA RESULTS DOCUMENT: HCPCS | Performed by: FAMILY MEDICINE

## 2019-01-28 PROCEDURE — G8427 DOCREV CUR MEDS BY ELIG CLIN: HCPCS | Performed by: FAMILY MEDICINE

## 2019-01-28 RX ORDER — FLUCONAZOLE 150 MG/1
TABLET ORAL
Qty: 2 TABLET | Refills: 0 | Status: SHIPPED | OUTPATIENT
Start: 2019-01-28 | End: 2019-02-22 | Stop reason: ALTCHOICE

## 2019-01-28 ASSESSMENT — ENCOUNTER SYMPTOMS
DIARRHEA: 0
NAUSEA: 0
SHORTNESS OF BREATH: 1
BACK PAIN: 0
CONSTIPATION: 0

## 2019-02-04 ENCOUNTER — HOSPITAL ENCOUNTER (OUTPATIENT)
Dept: WOMENS IMAGING | Age: 74
Discharge: HOME | End: 2019-02-04
Payer: MEDICARE

## 2019-02-04 ENCOUNTER — OFFICE VISIT (OUTPATIENT)
Dept: SURGERY | Age: 74
End: 2019-02-04
Payer: MEDICARE

## 2019-02-04 ENCOUNTER — OFFICE VISIT (OUTPATIENT)
Dept: CARDIOLOGY CLINIC | Age: 74
End: 2019-02-04
Payer: MEDICARE

## 2019-02-04 ENCOUNTER — TELEPHONE (OUTPATIENT)
Dept: FAMILY MEDICINE CLINIC | Age: 74
End: 2019-02-04

## 2019-02-04 VITALS
SYSTOLIC BLOOD PRESSURE: 128 MMHG | WEIGHT: 199 LBS | HEART RATE: 76 BPM | BODY MASS INDEX: 33.97 KG/M2 | HEIGHT: 64 IN | DIASTOLIC BLOOD PRESSURE: 82 MMHG

## 2019-02-04 VITALS
TEMPERATURE: 98.3 F | BODY MASS INDEX: 33.63 KG/M2 | DIASTOLIC BLOOD PRESSURE: 72 MMHG | SYSTOLIC BLOOD PRESSURE: 127 MMHG | HEART RATE: 59 BPM | HEIGHT: 64 IN | OXYGEN SATURATION: 97 % | WEIGHT: 197 LBS

## 2019-02-04 DIAGNOSIS — Z85.3 PERSONAL HISTORY OF BREAST CANCER: Primary | ICD-10-CM

## 2019-02-04 DIAGNOSIS — I10 ESSENTIAL HYPERTENSION: ICD-10-CM

## 2019-02-04 DIAGNOSIS — Z12.31 SCREENING MAMMOGRAM, ENCOUNTER FOR: ICD-10-CM

## 2019-02-04 DIAGNOSIS — E78.00 PURE HYPERCHOLESTEROLEMIA: ICD-10-CM

## 2019-02-04 DIAGNOSIS — R07.89 OTHER CHEST PAIN: Primary | ICD-10-CM

## 2019-02-04 PROCEDURE — 1123F ACP DISCUSS/DSCN MKR DOCD: CPT | Performed by: INTERNAL MEDICINE

## 2019-02-04 PROCEDURE — 4040F PNEUMOC VAC/ADMIN/RCVD: CPT | Performed by: SURGERY

## 2019-02-04 PROCEDURE — G8427 DOCREV CUR MEDS BY ELIG CLIN: HCPCS | Performed by: SURGERY

## 2019-02-04 PROCEDURE — 3017F COLORECTAL CA SCREEN DOC REV: CPT | Performed by: INTERNAL MEDICINE

## 2019-02-04 PROCEDURE — G8427 DOCREV CUR MEDS BY ELIG CLIN: HCPCS | Performed by: INTERNAL MEDICINE

## 2019-02-04 PROCEDURE — G8399 PT W/DXA RESULTS DOCUMENT: HCPCS | Performed by: INTERNAL MEDICINE

## 2019-02-04 PROCEDURE — 77067 SCR MAMMO BI INCL CAD: CPT

## 2019-02-04 PROCEDURE — 1101F PT FALLS ASSESS-DOCD LE1/YR: CPT | Performed by: INTERNAL MEDICINE

## 2019-02-04 PROCEDURE — 1036F TOBACCO NON-USER: CPT | Performed by: SURGERY

## 2019-02-04 PROCEDURE — 3017F COLORECTAL CA SCREEN DOC REV: CPT | Performed by: SURGERY

## 2019-02-04 PROCEDURE — 1090F PRES/ABSN URINE INCON ASSESS: CPT | Performed by: INTERNAL MEDICINE

## 2019-02-04 PROCEDURE — 4040F PNEUMOC VAC/ADMIN/RCVD: CPT | Performed by: INTERNAL MEDICINE

## 2019-02-04 PROCEDURE — G8417 CALC BMI ABV UP PARAM F/U: HCPCS | Performed by: INTERNAL MEDICINE

## 2019-02-04 PROCEDURE — G8482 FLU IMMUNIZE ORDER/ADMIN: HCPCS | Performed by: INTERNAL MEDICINE

## 2019-02-04 PROCEDURE — 1101F PT FALLS ASSESS-DOCD LE1/YR: CPT | Performed by: SURGERY

## 2019-02-04 PROCEDURE — G8399 PT W/DXA RESULTS DOCUMENT: HCPCS | Performed by: SURGERY

## 2019-02-04 PROCEDURE — 1123F ACP DISCUSS/DSCN MKR DOCD: CPT | Performed by: SURGERY

## 2019-02-04 PROCEDURE — 99204 OFFICE O/P NEW MOD 45 MIN: CPT | Performed by: INTERNAL MEDICINE

## 2019-02-04 PROCEDURE — G8482 FLU IMMUNIZE ORDER/ADMIN: HCPCS | Performed by: SURGERY

## 2019-02-04 PROCEDURE — 1036F TOBACCO NON-USER: CPT | Performed by: INTERNAL MEDICINE

## 2019-02-04 PROCEDURE — 1090F PRES/ABSN URINE INCON ASSESS: CPT | Performed by: SURGERY

## 2019-02-04 PROCEDURE — G8417 CALC BMI ABV UP PARAM F/U: HCPCS | Performed by: SURGERY

## 2019-02-04 PROCEDURE — 99213 OFFICE O/P EST LOW 20 MIN: CPT | Performed by: SURGERY

## 2019-02-04 ASSESSMENT — ENCOUNTER SYMPTOMS
GASTROINTESTINAL NEGATIVE: 1
EYES NEGATIVE: 1
RESPIRATORY NEGATIVE: 1

## 2019-02-05 ENCOUNTER — OFFICE VISIT (OUTPATIENT)
Dept: FAMILY MEDICINE CLINIC | Age: 74
End: 2019-02-05
Payer: MEDICARE

## 2019-02-05 VITALS
BODY MASS INDEX: 33.81 KG/M2 | SYSTOLIC BLOOD PRESSURE: 110 MMHG | OXYGEN SATURATION: 97 % | WEIGHT: 197 LBS | HEART RATE: 66 BPM | DIASTOLIC BLOOD PRESSURE: 82 MMHG

## 2019-02-05 DIAGNOSIS — B37.31 VAGINAL YEAST INFECTION: Primary | ICD-10-CM

## 2019-02-05 PROCEDURE — 4040F PNEUMOC VAC/ADMIN/RCVD: CPT | Performed by: PHYSICIAN ASSISTANT

## 2019-02-05 PROCEDURE — G8399 PT W/DXA RESULTS DOCUMENT: HCPCS | Performed by: PHYSICIAN ASSISTANT

## 2019-02-05 PROCEDURE — G8482 FLU IMMUNIZE ORDER/ADMIN: HCPCS | Performed by: PHYSICIAN ASSISTANT

## 2019-02-05 PROCEDURE — 1090F PRES/ABSN URINE INCON ASSESS: CPT | Performed by: PHYSICIAN ASSISTANT

## 2019-02-05 PROCEDURE — 1123F ACP DISCUSS/DSCN MKR DOCD: CPT | Performed by: PHYSICIAN ASSISTANT

## 2019-02-05 PROCEDURE — G8427 DOCREV CUR MEDS BY ELIG CLIN: HCPCS | Performed by: PHYSICIAN ASSISTANT

## 2019-02-05 PROCEDURE — 1036F TOBACCO NON-USER: CPT | Performed by: PHYSICIAN ASSISTANT

## 2019-02-05 PROCEDURE — 1101F PT FALLS ASSESS-DOCD LE1/YR: CPT | Performed by: PHYSICIAN ASSISTANT

## 2019-02-05 PROCEDURE — 99213 OFFICE O/P EST LOW 20 MIN: CPT | Performed by: PHYSICIAN ASSISTANT

## 2019-02-05 PROCEDURE — G8417 CALC BMI ABV UP PARAM F/U: HCPCS | Performed by: PHYSICIAN ASSISTANT

## 2019-02-05 PROCEDURE — 3017F COLORECTAL CA SCREEN DOC REV: CPT | Performed by: PHYSICIAN ASSISTANT

## 2019-02-05 RX ORDER — FLUCONAZOLE 100 MG/1
100 TABLET ORAL DAILY
Qty: 7 TABLET | Refills: 0 | Status: SHIPPED | OUTPATIENT
Start: 2019-02-05 | End: 2019-02-12 | Stop reason: SDUPTHER

## 2019-02-05 ASSESSMENT — ENCOUNTER SYMPTOMS
DIARRHEA: 0
ABDOMINAL PAIN: 0
VOMITING: 0
NAUSEA: 0
BACK PAIN: 0
CONSTIPATION: 0
SHORTNESS OF BREATH: 0

## 2019-02-12 ENCOUNTER — TELEPHONE (OUTPATIENT)
Dept: FAMILY MEDICINE CLINIC | Age: 74
End: 2019-02-12

## 2019-02-12 DIAGNOSIS — B37.9 YEAST INFECTION: Primary | ICD-10-CM

## 2019-02-12 RX ORDER — FLUCONAZOLE 100 MG/1
100 TABLET ORAL DAILY
Qty: 7 TABLET | Refills: 0 | Status: SHIPPED | OUTPATIENT
Start: 2019-02-12 | End: 2019-02-19

## 2019-02-18 ENCOUNTER — HOSPITAL ENCOUNTER (OUTPATIENT)
Dept: NON INVASIVE DIAGNOSTICS | Age: 74
Discharge: HOME | End: 2019-02-18
Payer: MEDICARE

## 2019-02-18 ENCOUNTER — HOSPITAL ENCOUNTER (OUTPATIENT)
Dept: CT IMAGING | Age: 74
Discharge: HOME | End: 2019-02-18
Payer: MEDICARE

## 2019-02-18 DIAGNOSIS — R07.89 OTHER CHEST PAIN: ICD-10-CM

## 2019-02-18 LAB
LV EF: 74 %
LVEF MODALITY: NORMAL

## 2019-02-18 PROCEDURE — 3430000000 HC RX DIAGNOSTIC RADIOPHARMACEUTICAL: Performed by: INTERNAL MEDICINE

## 2019-02-18 PROCEDURE — 78452 HT MUSCLE IMAGE SPECT MULT: CPT

## 2019-02-18 PROCEDURE — 93017 CV STRESS TEST TRACING ONLY: CPT | Performed by: INTERNAL MEDICINE

## 2019-02-18 PROCEDURE — A9502 TC99M TETROFOSMIN: HCPCS | Performed by: INTERNAL MEDICINE

## 2019-02-18 PROCEDURE — 6360000002 HC RX W HCPCS: Performed by: INTERNAL MEDICINE

## 2019-02-18 PROCEDURE — 75571 CT HRT W/O DYE W/CA TEST: CPT

## 2019-02-18 RX ADMIN — TETROFOSMIN 10 MILLICURIE: 0.23 INJECTION, POWDER, LYOPHILIZED, FOR SOLUTION INTRAVENOUS at 08:00

## 2019-02-18 RX ADMIN — TETROFOSMIN 30 MILLICURIE: 0.23 INJECTION, POWDER, LYOPHILIZED, FOR SOLUTION INTRAVENOUS at 09:13

## 2019-02-18 RX ADMIN — REGADENOSON 0.4 MG: 0.08 INJECTION, SOLUTION INTRAVENOUS at 09:03

## 2019-02-21 ENCOUNTER — TELEPHONE (OUTPATIENT)
Dept: FAMILY MEDICINE CLINIC | Age: 74
End: 2019-02-21

## 2019-02-22 ENCOUNTER — OFFICE VISIT (OUTPATIENT)
Dept: FAMILY MEDICINE CLINIC | Age: 74
End: 2019-02-22
Payer: MEDICARE

## 2019-02-22 VITALS
SYSTOLIC BLOOD PRESSURE: 124 MMHG | OXYGEN SATURATION: 95 % | DIASTOLIC BLOOD PRESSURE: 76 MMHG | HEART RATE: 61 BPM | TEMPERATURE: 98.8 F | BODY MASS INDEX: 33.47 KG/M2 | WEIGHT: 195 LBS

## 2019-02-22 DIAGNOSIS — M85.80 OSTEOPENIA, UNSPECIFIED LOCATION: Primary | ICD-10-CM

## 2019-02-22 DIAGNOSIS — F51.01 PRIMARY INSOMNIA: ICD-10-CM

## 2019-02-22 DIAGNOSIS — B37.31 CANDIDA VAGINITIS: ICD-10-CM

## 2019-02-22 PROCEDURE — 99213 OFFICE O/P EST LOW 20 MIN: CPT | Performed by: FAMILY MEDICINE

## 2019-02-22 PROCEDURE — 1090F PRES/ABSN URINE INCON ASSESS: CPT | Performed by: FAMILY MEDICINE

## 2019-02-22 PROCEDURE — 3017F COLORECTAL CA SCREEN DOC REV: CPT | Performed by: FAMILY MEDICINE

## 2019-02-22 PROCEDURE — 1101F PT FALLS ASSESS-DOCD LE1/YR: CPT | Performed by: FAMILY MEDICINE

## 2019-02-22 PROCEDURE — G8399 PT W/DXA RESULTS DOCUMENT: HCPCS | Performed by: FAMILY MEDICINE

## 2019-02-22 PROCEDURE — 4040F PNEUMOC VAC/ADMIN/RCVD: CPT | Performed by: FAMILY MEDICINE

## 2019-02-22 PROCEDURE — 1036F TOBACCO NON-USER: CPT | Performed by: FAMILY MEDICINE

## 2019-02-22 PROCEDURE — 1123F ACP DISCUSS/DSCN MKR DOCD: CPT | Performed by: FAMILY MEDICINE

## 2019-02-22 PROCEDURE — G8427 DOCREV CUR MEDS BY ELIG CLIN: HCPCS | Performed by: FAMILY MEDICINE

## 2019-02-22 PROCEDURE — G8417 CALC BMI ABV UP PARAM F/U: HCPCS | Performed by: FAMILY MEDICINE

## 2019-02-22 PROCEDURE — G8482 FLU IMMUNIZE ORDER/ADMIN: HCPCS | Performed by: FAMILY MEDICINE

## 2019-02-22 RX ORDER — RALOXIFENE HYDROCHLORIDE 60 MG/1
TABLET, FILM COATED ORAL
Qty: 90 TABLET | Refills: 3 | Status: SHIPPED | OUTPATIENT
Start: 2019-02-22 | End: 2019-06-17 | Stop reason: SDUPTHER

## 2019-02-22 RX ORDER — CLONAZEPAM 1 MG/1
TABLET ORAL
Qty: 90 TABLET | Refills: 0 | Status: SHIPPED | OUTPATIENT
Start: 2019-02-22 | End: 2019-05-30 | Stop reason: SDUPTHER

## 2019-02-22 RX ORDER — CLOTRIMAZOLE AND BETAMETHASONE DIPROPIONATE 10; .64 MG/G; MG/G
CREAM TOPICAL
Qty: 15 G | Refills: 1 | Status: SHIPPED | OUTPATIENT
Start: 2019-02-22 | End: 2019-05-30

## 2019-02-22 ASSESSMENT — PATIENT HEALTH QUESTIONNAIRE - PHQ9
SUM OF ALL RESPONSES TO PHQ QUESTIONS 1-9: 0
1. LITTLE INTEREST OR PLEASURE IN DOING THINGS: 0
SUM OF ALL RESPONSES TO PHQ9 QUESTIONS 1 & 2: 0
SUM OF ALL RESPONSES TO PHQ QUESTIONS 1-9: 0
2. FEELING DOWN, DEPRESSED OR HOPELESS: 0

## 2019-03-07 RX ORDER — MELOXICAM 15 MG/1
7.5 TABLET ORAL DAILY
Qty: 90 TABLET | Refills: 1 | Status: SHIPPED | OUTPATIENT
Start: 2019-03-07 | End: 2019-05-30

## 2019-04-23 RX ORDER — ATENOLOL 100 MG/1
TABLET ORAL
Qty: 90 TABLET | Refills: 0 | Status: SHIPPED | OUTPATIENT
Start: 2019-04-23 | End: 2019-07-21 | Stop reason: SDUPTHER

## 2019-04-30 ENCOUNTER — TELEPHONE (OUTPATIENT)
Dept: FAMILY MEDICINE CLINIC | Age: 74
End: 2019-04-30

## 2019-04-30 NOTE — TELEPHONE ENCOUNTER
Patient is calling stating she is pretty sure she has been exposed to the shingles . Is there anything she should do or be concerned about ? Any symptoms she should look for ? Please advise . Provider out of office .

## 2019-05-06 RX ORDER — LISINOPRIL 40 MG/1
40 TABLET ORAL DAILY
Qty: 90 TABLET | Refills: 1 | Status: SHIPPED | OUTPATIENT
Start: 2019-05-06 | End: 2019-08-30 | Stop reason: SDUPTHER

## 2019-05-30 ENCOUNTER — OFFICE VISIT (OUTPATIENT)
Dept: FAMILY MEDICINE CLINIC | Age: 74
End: 2019-05-30
Payer: MEDICARE

## 2019-05-30 VITALS
BODY MASS INDEX: 34.33 KG/M2 | WEIGHT: 200 LBS | SYSTOLIC BLOOD PRESSURE: 126 MMHG | HEART RATE: 70 BPM | OXYGEN SATURATION: 96 % | DIASTOLIC BLOOD PRESSURE: 80 MMHG

## 2019-05-30 DIAGNOSIS — E87.5 HYPERKALEMIA: ICD-10-CM

## 2019-05-30 DIAGNOSIS — Z85.3 HX: BREAST CANCER: ICD-10-CM

## 2019-05-30 DIAGNOSIS — E78.2 MIXED HYPERLIPIDEMIA: ICD-10-CM

## 2019-05-30 DIAGNOSIS — I10 ESSENTIAL HYPERTENSION: ICD-10-CM

## 2019-05-30 DIAGNOSIS — Z23 NEED FOR SHINGLES VACCINE: Primary | ICD-10-CM

## 2019-05-30 DIAGNOSIS — F51.01 PRIMARY INSOMNIA: ICD-10-CM

## 2019-05-30 DIAGNOSIS — M19.90 ARTHRITIS: ICD-10-CM

## 2019-05-30 DIAGNOSIS — M25.552 LEFT HIP PAIN: ICD-10-CM

## 2019-05-30 PROCEDURE — 1123F ACP DISCUSS/DSCN MKR DOCD: CPT | Performed by: FAMILY MEDICINE

## 2019-05-30 PROCEDURE — 1090F PRES/ABSN URINE INCON ASSESS: CPT | Performed by: FAMILY MEDICINE

## 2019-05-30 PROCEDURE — 4040F PNEUMOC VAC/ADMIN/RCVD: CPT | Performed by: FAMILY MEDICINE

## 2019-05-30 PROCEDURE — 1036F TOBACCO NON-USER: CPT | Performed by: FAMILY MEDICINE

## 2019-05-30 PROCEDURE — 99214 OFFICE O/P EST MOD 30 MIN: CPT | Performed by: FAMILY MEDICINE

## 2019-05-30 PROCEDURE — G8399 PT W/DXA RESULTS DOCUMENT: HCPCS | Performed by: FAMILY MEDICINE

## 2019-05-30 PROCEDURE — G8417 CALC BMI ABV UP PARAM F/U: HCPCS | Performed by: FAMILY MEDICINE

## 2019-05-30 PROCEDURE — G8427 DOCREV CUR MEDS BY ELIG CLIN: HCPCS | Performed by: FAMILY MEDICINE

## 2019-05-30 RX ORDER — MELOXICAM 7.5 MG/1
7.5 TABLET ORAL DAILY
COMMUNITY
End: 2019-05-30 | Stop reason: SDUPTHER

## 2019-05-30 RX ORDER — CLONAZEPAM 1 MG/1
TABLET ORAL
Qty: 90 TABLET | Refills: 1 | Status: SHIPPED | OUTPATIENT
Start: 2019-05-30 | End: 2019-12-06 | Stop reason: SDUPTHER

## 2019-05-30 RX ORDER — MELOXICAM 7.5 MG/1
7.5 TABLET ORAL DAILY
Qty: 90 TABLET | Refills: 3 | Status: SHIPPED | OUTPATIENT
Start: 2019-05-30 | End: 2019-08-30 | Stop reason: SDUPTHER

## 2019-05-30 RX ORDER — LORATADINE 10 MG/1
10 CAPSULE, LIQUID FILLED ORAL DAILY
COMMUNITY
End: 2019-06-11

## 2019-05-30 ASSESSMENT — ENCOUNTER SYMPTOMS
RHINORRHEA: 0
CHEST TIGHTNESS: 0
SHORTNESS OF BREATH: 0
DIARRHEA: 0
WHEEZING: 0
CONSTIPATION: 0

## 2019-05-30 NOTE — PROGRESS NOTES
SUBJECTIVE:    Aurelia Raymundo is a 66 y.o. female who presents for a follow up visit. Chief Complaint   Patient presents with    Follow-up     Patient is here for a follow up. C/O left hip pain, no known injury, started about 2-3 months ago. Hip Pain    Incident onset: Onset 2 months  There was no injury mechanism. The pain is present in the left hip and left leg. The quality of the pain is described as aching, shooting and stabbing. The pain is moderate. The pain has been fluctuating since onset. Associated symptoms include an inability to bear weight (after being upright for a while). The symptoms are aggravated by weight bearing. She has tried NSAIDs for the symptoms. Hyperlipidemia   This is a chronic problem. The current episode started more than 1 year ago. Lipid results: Total cholesterol 271, triglycerides 229, HDL 60, . Exacerbating diseases include obesity. Factors aggravating her hyperlipidemia include beta blockers, thiazides and fatty foods. Pertinent negatives include no focal weakness, leg pain, myalgias or shortness of breath. She is currently on no antihyperlipidemic treatment. Compliance problems include adherence to exercise, adherence to diet and medication side effects. Risk factors for coronary artery disease include hypertension, a sedentary lifestyle and dyslipidemia. Hypertension   This is a chronic problem. The current episode started more than 1 year ago. The problem is controlled. Pertinent negatives include no shortness of breath. Agents associated with hypertension include NSAIDs. Risk factors for coronary artery disease include dyslipidemia, obesity and sedentary lifestyle. Past treatments include beta blockers and diuretics. The current treatment provides significant improvement. Compliance problems include exercise and diet. Patient's medications, allergies, past medical,surgical, social and family histories were reviewed and updated as appropriate. Past Medical History:   Diagnosis Date    Cancer [facility])     breast, left    Congenital hernia of the diaphragm     Hyperlipidemia     Hypertension      Past Surgical History:   Procedure Laterality Date    APPENDECTOMY      BREAST SURGERY      left mastectomy    EYE SURGERY      HERNIA REPAIR      lapascopic hiatal hernia repair    TOTAL KNEE ARTHROPLASTY  4/2016    cangemi    TUBAL LIGATION       Family History   Problem Relation Age of Onset    High Blood Pressure Mother     Heart Disease Mother     High Blood Pressure Sister     Diabetes Sister     High Blood Pressure Brother     Heart Disease Brother     Cancer Maternal Uncle     Breast Cancer Paternal Aunt     Uterine Cancer Maternal Grandmother      Social History     Socioeconomic History    Marital status:       Spouse name: Not on file    Number of children: Not on file    Years of education: Not on file    Highest education level: Not on file   Occupational History    Not on file   Social Needs    Financial resource strain: Not on file    Food insecurity:     Worry: Not on file     Inability: Not on file    Transportation needs:     Medical: Not on file     Non-medical: Not on file   Tobacco Use    Smoking status: Never Smoker    Smokeless tobacco: Never Used   Substance and Sexual Activity    Alcohol use: No     Alcohol/week: 0.0 oz    Drug use: No    Sexual activity: Not on file   Lifestyle    Physical activity:     Days per week: Not on file     Minutes per session: Not on file    Stress: Not on file   Relationships    Social connections:     Talks on phone: Not on file     Gets together: Not on file     Attends [employer] service: Not on file     Active member of club or organization: Not on file     Attends meetings of clubs or organizations: Not on file     Relationship status: Not on file    Intimate partner violence:     Fear of current or ex partner: Not on file     Emotionally abused: Not on file Physically abused: Not on file     Forced sexual activity: Not on file   Other Topics Concern    Not on file   Social History Narrative    Not on file     Allergies   Allergen Reactions    Cephalosporins Hives    Nickel     Tetanus Toxoids      Current Outpatient Medications   Medication Sig Dispense Refill    loratadine (CLARITIN) 10 MG capsule Take 10 mg by mouth daily      clonazePAM (KLONOPIN) 1 MG tablet TAKE 1 TABLET NIGHTLY AS   NEEDED FOR (SLEEP) 90 tablet 1    meloxicam (MOBIC) 7.5 MG tablet Take 1 tablet by mouth daily 90 tablet 3    zoster recombinant adjuvanted vaccine (SHINGRIX) 50 MCG/0.5ML SUSR injection Inject 0.5 mLs into the muscle once for 1 dose 0.5 mL 1    lisinopril (PRINIVIL;ZESTRIL) 40 MG tablet Take 1 tablet by mouth daily 90 tablet 1    atenolol (TENORMIN) 100 MG tablet TAKE 1 TABLET BY MOUTH EVERY DAY 90 tablet 0    raloxifene (EVISTA) 60 MG tablet TAKE 1 TABLET DAILY 90 tablet 3    hydrochlorothiazide (HYDRODIURIL) 25 MG tablet Take 1 tablet by mouth daily 90 tablet 3    aspirin 81 MG tablet Take 81 mg by mouth daily      Breast Prosthesis MISC by Does not apply route left breast prosthetic and bra. Please give max for insurance coverage. 1 each 5    acetaminophen (TYLENOL ARTHRITIS PAIN) 650 MG CR tablet Take 650 mg by mouth 2 times daily       calcium carbonate-vitamin D (CALCIUM 600 + D) 600-400 MG-UNIT TABS per tab Take 1 tablet by mouth daily. No current facility-administered medications for this visit. Review of Systems   Constitutional: Positive for activity change (decreased due to lower extremity pain). Negative for unexpected weight change. HENT: Positive for postnasal drip (improving). Negative for congestion and rhinorrhea. Respiratory: Negative for chest tightness, shortness of breath and wheezing. Cardiovascular: Positive for leg swelling. Gastrointestinal: Negative for constipation and diarrhea.    Genitourinary: Negative for difficulty urinating. Musculoskeletal: Positive for arthralgias (both knees and left hip.). Negative for myalgias. Neurological: Negative for dizziness, focal weakness and light-headedness. Psychiatric/Behavioral: Positive for sleep disturbance (MNA). Negative for dysphoric mood. OBJECTIVE:    /80   Pulse 70   Wt 200 lb (90.7 kg)   SpO2 96%   BMI 34.33 kg/m²    Physical Exam   Constitutional: She is oriented to person, place, and time. She appears well-developed and well-nourished. HENT:   Head: Normocephalic and atraumatic. Right Ear: External ear normal.   Left Ear: External ear normal.   Nose: Nose normal.   Mouth/Throat: Oropharynx is clear and moist.   Eyes: Conjunctivae and EOM are normal. Right eye exhibits no discharge. Neck: Normal range of motion. Neck supple. No JVD present. No tracheal deviation present. No thyromegaly present. Cardiovascular: Normal rate, regular rhythm and normal heart sounds. Pulmonary/Chest: Effort normal and breath sounds normal. No respiratory distress. She has no rales. Lymphadenopathy:     She has no cervical adenopathy. Neurological: She is alert and oriented to person, place, and time. Skin: Skin is warm and dry. Psychiatric: She has a normal mood and affect. ASSESSMENT/PLAN:    Fay Rios was seen today for follow-up. Diagnoses and all orders for this visit:    Need for shingles vaccine  -     zoster recombinant adjuvanted vaccine [facility]) 50 MCG/0.5ML SUSR injection; Inject 0.5 mLs into the muscle once for 1 dose    Primary insomnia  -     clonazePAM (KLONOPIN) 1 MG tablet; TAKE 1 TABLET NIGHTLY AS   NEEDED FOR (SLEEP)    Essential hypertension    Mixed hyperlipidemia    HX: breast cancer    Arthritis  -     meloxicam (MOBIC) 7.5 MG tablet; Take 1 tablet by mouth daily        Return in about 3 months (around 8/30/2019).     Please note portions of this note were completed with a voicerecognition program.  Efforts were made to edit the dictations but occasionally words are mis-transcribed.

## 2019-06-10 ENCOUNTER — TELEPHONE (OUTPATIENT)
Dept: FAMILY MEDICINE CLINIC | Age: 74
End: 2019-06-10

## 2019-06-10 NOTE — TELEPHONE ENCOUNTER
Patient notified of lab results DOS 6/3/19, advised potassium was 5.7 now back to normal at 4.4. Recommend will need CMP & Lipid panel prior to 8/30/19.

## 2019-06-10 NOTE — TELEPHONE ENCOUNTER
Also patient has history of bursitis in hip she's requesting hip injection (which she states she's had in past) after consulting with Dr Treva Farnsworth appointment scheduled tomorrow

## 2019-06-11 ENCOUNTER — OFFICE VISIT (OUTPATIENT)
Dept: FAMILY MEDICINE CLINIC | Age: 74
End: 2019-06-11
Payer: MEDICARE

## 2019-06-11 VITALS
HEART RATE: 64 BPM | DIASTOLIC BLOOD PRESSURE: 78 MMHG | BODY MASS INDEX: 34.16 KG/M2 | SYSTOLIC BLOOD PRESSURE: 132 MMHG | OXYGEN SATURATION: 97 % | WEIGHT: 199 LBS

## 2019-06-11 DIAGNOSIS — M70.62 GREATER TROCHANTERIC BURSITIS OF LEFT HIP: Primary | ICD-10-CM

## 2019-06-11 PROCEDURE — 4040F PNEUMOC VAC/ADMIN/RCVD: CPT | Performed by: INTERNAL MEDICINE

## 2019-06-11 PROCEDURE — G8427 DOCREV CUR MEDS BY ELIG CLIN: HCPCS | Performed by: INTERNAL MEDICINE

## 2019-06-11 PROCEDURE — G8399 PT W/DXA RESULTS DOCUMENT: HCPCS | Performed by: INTERNAL MEDICINE

## 2019-06-11 PROCEDURE — 1090F PRES/ABSN URINE INCON ASSESS: CPT | Performed by: INTERNAL MEDICINE

## 2019-06-11 PROCEDURE — 1123F ACP DISCUSS/DSCN MKR DOCD: CPT | Performed by: INTERNAL MEDICINE

## 2019-06-11 PROCEDURE — 1036F TOBACCO NON-USER: CPT | Performed by: INTERNAL MEDICINE

## 2019-06-11 PROCEDURE — G8417 CALC BMI ABV UP PARAM F/U: HCPCS | Performed by: INTERNAL MEDICINE

## 2019-06-11 PROCEDURE — 99213 OFFICE O/P EST LOW 20 MIN: CPT | Performed by: INTERNAL MEDICINE

## 2019-06-11 PROCEDURE — 96372 THER/PROPH/DIAG INJ SC/IM: CPT | Performed by: INTERNAL MEDICINE

## 2019-06-11 RX ORDER — BETAMETHASONE SODIUM PHOSPHATE AND BETAMETHASONE ACETATE 3; 3 MG/ML; MG/ML
6 INJECTION, SUSPENSION INTRA-ARTICULAR; INTRALESIONAL; INTRAMUSCULAR; SOFT TISSUE ONCE
Status: COMPLETED | OUTPATIENT
Start: 2019-06-11 | End: 2019-06-11

## 2019-06-11 RX ADMIN — BETAMETHASONE SODIUM PHOSPHATE AND BETAMETHASONE ACETATE 6 MG: 3; 3 INJECTION, SUSPENSION INTRA-ARTICULAR; INTRALESIONAL; INTRAMUSCULAR; SOFT TISSUE at 11:56

## 2019-06-11 ASSESSMENT — ENCOUNTER SYMPTOMS
BACK PAIN: 0
SHORTNESS OF BREATH: 0
COUGH: 0

## 2019-06-11 NOTE — PROGRESS NOTES
Erwin Hoffmann  : 1945  Encounter date: 2019    This jaren 66 y.o. female who presents with  Chief Complaint   Patient presents with    Hip Pain       History of present illness:    HPI Patient presents today for 3 week duration of left hip pain. Pt reports the pain is worse when standing stationary, relieved by activity and sitting. Reports point TTP over lateral hip. Denies anterior hip pain, back pain. She reports she also has worsening left knee pain after allergic reaction to metal partial knee implant last year. She follows with Ortho for this. She denies back pain. She denies hx back or hip injuries, she denies incontinence. Allergies   Allergen Reactions    Cephalosporins Hives    Nickel     Tetanus Toxoids      Current Outpatient Medications   Medication Sig Dispense Refill    clonazePAM (KLONOPIN) 1 MG tablet TAKE 1 TABLET NIGHTLY AS   NEEDED FOR (SLEEP) 90 tablet 1    meloxicam (MOBIC) 7.5 MG tablet Take 1 tablet by mouth daily 90 tablet 3    lisinopril (PRINIVIL;ZESTRIL) 40 MG tablet Take 1 tablet by mouth daily 90 tablet 1    atenolol (TENORMIN) 100 MG tablet TAKE 1 TABLET BY MOUTH EVERY DAY 90 tablet 0    raloxifene (EVISTA) 60 MG tablet TAKE 1 TABLET DAILY 90 tablet 3    hydrochlorothiazide (HYDRODIURIL) 25 MG tablet Take 1 tablet by mouth daily 90 tablet 3    aspirin 81 MG tablet Take 81 mg by mouth daily      Breast Prosthesis MISC by Does not apply route left breast prosthetic and bra. Please give max for insurance coverage. 1 each 5    acetaminophen (TYLENOL ARTHRITIS PAIN) 650 MG CR tablet Take 650 mg by mouth 2 times daily       calcium carbonate-vitamin D (CALCIUM 600 + D) 600-400 MG-UNIT TABS per tab Take 1 tablet by mouth daily. No current facility-administered medications for this visit. Review of Systems   Constitutional: Negative for activity change, appetite change, chills, fatigue and fever.    Respiratory: Negative for cough and shortness of breath. Cardiovascular: Negative for chest pain and palpitations. Musculoskeletal: Positive for arthralgias and gait problem. Negative for back pain, joint swelling and myalgias. Skin: Negative for rash. Neurological: Negative for headaches. Past medical, surgical, family and social history were reviewed and updated with the patient. Objective:    /78 (Site: Right Upper Arm, Position: Sitting, Cuff Size: Large Adult)   Pulse 64   Wt 199 lb (90.3 kg)   SpO2 97%   BMI 34.16 kg/m²   Weight: 199 lb (90.3 kg)     BP Readings from Last 3 Encounters:   06/11/19 132/78   05/30/19 126/80   02/22/19 124/76     Wt Readings from Last 3 Encounters:   06/11/19 199 lb (90.3 kg)   05/30/19 200 lb (90.7 kg)   02/22/19 195 lb (88.5 kg)       Physical Exam   Constitutional: She is oriented to person, place, and time. She appears well-developed and well-nourished. No distress. HENT:   Head: Normocephalic and atraumatic. Eyes: Pupils are equal, round, and reactive to light. Conjunctivae and EOM are normal.   Cardiovascular: Normal rate, regular rhythm, normal heart sounds and intact distal pulses. Exam reveals no gallop and no friction rub. No murmur heard. Pulmonary/Chest: Effort normal and breath sounds normal. No respiratory distress. She has no wheezes. She has no rales. She exhibits no tenderness. Musculoskeletal:   Left hip: No TTP anterior hip, posterior hip. No pain with resisted abduction/adduction. + point tenderness over lateral hip. Mod discomfort with IR, mild discomfort with resisted ER. No skin bruising, rash, etc.    Neurological: She is alert and oriented to person, place, and time. Skin: Skin is warm and dry. She is not diaphoretic. Psychiatric: She has a normal mood and affect. Her behavior is normal. Judgment and thought content normal.   Vitals reviewed. Assessment/Plan    1.  Greater trochanteric bursitis of left hip  - [address] troch hip injection today, pt tolerated procedure well w/o immediate complications. F/u 1 week for response to injection. Refer to PT to correct underlying mm imbalances in both hip and knee. If requires knee injections, able to provide those as well, but would request xrays first.       Greater trochanteric bursa Injection Procedure Note    Pre-operative Diagnosis: left greater trochanteric bursitis    Post-operative Diagnosis: same    Indications: symptomatic relief of bursitis    Anesthesia: Lidocaine 1% with epinephrine without added sodium bicarbonate    Procedure Details     Verbal consent was obtained for the procedure. The joint was prepped with chlorhexadine and a small wheel of anesthetic was injected into the subcutaneous tissue. A 3.5 inch spinal needle was inserted into the point of maximal tenderness over the greater trochanter from a lateral approach. 2 ml 1% lidocaine, 2 ml of 0.5% marcaine and 1 ml of betamethasone (CELESTONE) was then injected into the joint through the same needle. The needle was removed and the area cleansed and dressed. Complications:  None; patient tolerated the procedure well. No follow-ups on file.

## 2019-06-17 DIAGNOSIS — M85.80 OSTEOPENIA, UNSPECIFIED LOCATION: ICD-10-CM

## 2019-06-17 RX ORDER — RALOXIFENE HYDROCHLORIDE 60 MG/1
TABLET, FILM COATED ORAL
Qty: 90 TABLET | Refills: 1 | Status: SHIPPED | OUTPATIENT
Start: 2019-06-17 | End: 2019-12-09 | Stop reason: SDUPTHER

## 2019-07-22 RX ORDER — ATENOLOL 100 MG/1
TABLET ORAL
Qty: 90 TABLET | Refills: 1 | Status: SHIPPED | OUTPATIENT
Start: 2019-07-22 | End: 2019-08-30 | Stop reason: SDUPTHER

## 2019-08-26 ENCOUNTER — TELEPHONE (OUTPATIENT)
Dept: FAMILY MEDICINE CLINIC | Age: 74
End: 2019-08-26

## 2019-08-26 DIAGNOSIS — E78.2 MIXED HYPERLIPIDEMIA: Primary | ICD-10-CM

## 2019-08-26 DIAGNOSIS — I10 ESSENTIAL HYPERTENSION: ICD-10-CM

## 2019-08-26 NOTE — TELEPHONE ENCOUNTER
Patient has an appt 8-30-19 Patient will need labs done prior to her appt.   Please fax orders to [phone] [employer].

## 2019-08-30 ENCOUNTER — OFFICE VISIT (OUTPATIENT)
Dept: FAMILY MEDICINE CLINIC | Age: 74
End: 2019-08-30
Payer: MEDICARE

## 2019-08-30 VITALS
SYSTOLIC BLOOD PRESSURE: 118 MMHG | DIASTOLIC BLOOD PRESSURE: 80 MMHG | HEART RATE: 62 BPM | OXYGEN SATURATION: 98 % | WEIGHT: 197 LBS | BODY MASS INDEX: 33.81 KG/M2

## 2019-08-30 DIAGNOSIS — R73.9 HYPERGLYCEMIA: ICD-10-CM

## 2019-08-30 DIAGNOSIS — Z78.0 MENOPAUSE: ICD-10-CM

## 2019-08-30 DIAGNOSIS — I10 ESSENTIAL HYPERTENSION: ICD-10-CM

## 2019-08-30 DIAGNOSIS — M19.90 ARTHRITIS: ICD-10-CM

## 2019-08-30 DIAGNOSIS — E78.2 MIXED HYPERLIPIDEMIA: Primary | ICD-10-CM

## 2019-08-30 PROCEDURE — G8417 CALC BMI ABV UP PARAM F/U: HCPCS | Performed by: FAMILY MEDICINE

## 2019-08-30 PROCEDURE — 4040F PNEUMOC VAC/ADMIN/RCVD: CPT | Performed by: FAMILY MEDICINE

## 2019-08-30 PROCEDURE — G8399 PT W/DXA RESULTS DOCUMENT: HCPCS | Performed by: FAMILY MEDICINE

## 2019-08-30 PROCEDURE — 1123F ACP DISCUSS/DSCN MKR DOCD: CPT | Performed by: FAMILY MEDICINE

## 2019-08-30 PROCEDURE — G8427 DOCREV CUR MEDS BY ELIG CLIN: HCPCS | Performed by: FAMILY MEDICINE

## 2019-08-30 PROCEDURE — 1090F PRES/ABSN URINE INCON ASSESS: CPT | Performed by: FAMILY MEDICINE

## 2019-08-30 PROCEDURE — 1036F TOBACCO NON-USER: CPT | Performed by: FAMILY MEDICINE

## 2019-08-30 PROCEDURE — 99214 OFFICE O/P EST MOD 30 MIN: CPT | Performed by: FAMILY MEDICINE

## 2019-08-30 RX ORDER — LISINOPRIL 40 MG/1
40 TABLET ORAL DAILY
Qty: 90 TABLET | Refills: 3 | Status: SHIPPED | OUTPATIENT
Start: 2019-08-30 | End: 2020-12-09

## 2019-08-30 RX ORDER — LORATADINE 10 MG/1
10 TABLET ORAL PRN
COMMUNITY
End: 2020-12-17

## 2019-08-30 RX ORDER — HYDROCHLOROTHIAZIDE 25 MG/1
25 TABLET ORAL DAILY
Qty: 90 TABLET | Refills: 3 | Status: SHIPPED | OUTPATIENT
Start: 2019-08-30 | End: 2020-10-19

## 2019-08-30 RX ORDER — MELOXICAM 7.5 MG/1
7.5 TABLET ORAL DAILY
Qty: 90 TABLET | Refills: 3 | Status: SHIPPED | OUTPATIENT
Start: 2019-08-30 | End: 2020-10-05

## 2019-08-30 RX ORDER — ATENOLOL 100 MG/1
TABLET ORAL
Qty: 90 TABLET | Refills: 3 | Status: SHIPPED | OUTPATIENT
Start: 2019-08-30 | End: 2020-10-19

## 2019-08-30 ASSESSMENT — ENCOUNTER SYMPTOMS
RHINORRHEA: 0
ABDOMINAL PAIN: 0
BACK PAIN: 0
CHEST TIGHTNESS: 0
SINUS PAIN: 0
CONSTIPATION: 0
SINUS PRESSURE: 0
SHORTNESS OF BREATH: 0
WHEEZING: 0
DIARRHEA: 0

## 2019-09-10 ENCOUNTER — HOSPITAL ENCOUNTER (OUTPATIENT)
Dept: GENERAL RADIOLOGY | Age: 74
Discharge: HOME | End: 2019-09-10
Payer: MEDICARE

## 2019-09-10 DIAGNOSIS — Z78.0 MENOPAUSE: ICD-10-CM

## 2019-09-10 PROCEDURE — 77080 DXA BONE DENSITY AXIAL: CPT

## 2019-12-06 DIAGNOSIS — F51.01 PRIMARY INSOMNIA: ICD-10-CM

## 2019-12-09 DIAGNOSIS — M85.80 OSTEOPENIA, UNSPECIFIED LOCATION: ICD-10-CM

## 2019-12-09 RX ORDER — RALOXIFENE HYDROCHLORIDE 60 MG/1
TABLET, FILM COATED ORAL
Qty: 90 TABLET | Refills: 1 | Status: SHIPPED | OUTPATIENT
Start: 2019-12-09 | End: 2019-12-13 | Stop reason: SDUPTHER

## 2019-12-13 DIAGNOSIS — M85.80 OSTEOPENIA, UNSPECIFIED LOCATION: ICD-10-CM

## 2019-12-13 RX ORDER — CLONAZEPAM 1 MG/1
TABLET ORAL
Qty: 90 TABLET | Refills: 0 | Status: SHIPPED | OUTPATIENT
Start: 2019-12-13 | End: 2020-02-14 | Stop reason: SDUPTHER

## 2019-12-13 RX ORDER — RALOXIFENE HYDROCHLORIDE 60 MG/1
TABLET, FILM COATED ORAL
Qty: 90 TABLET | Refills: 1 | Status: SHIPPED | OUTPATIENT
Start: 2019-12-13 | End: 2020-06-03

## 2020-01-24 ENCOUNTER — TELEPHONE (OUTPATIENT)
Dept: FAMILY MEDICINE CLINIC | Age: 75
End: 2020-01-24

## 2020-02-14 ENCOUNTER — OFFICE VISIT (OUTPATIENT)
Dept: FAMILY MEDICINE CLINIC | Age: 75
End: 2020-02-14
Payer: MEDICARE

## 2020-02-14 VITALS
WEIGHT: 181 LBS | OXYGEN SATURATION: 97 % | BODY MASS INDEX: 31.07 KG/M2 | HEART RATE: 67 BPM | DIASTOLIC BLOOD PRESSURE: 80 MMHG | SYSTOLIC BLOOD PRESSURE: 126 MMHG

## 2020-02-14 PROCEDURE — 1036F TOBACCO NON-USER: CPT | Performed by: FAMILY MEDICINE

## 2020-02-14 PROCEDURE — G8399 PT W/DXA RESULTS DOCUMENT: HCPCS | Performed by: FAMILY MEDICINE

## 2020-02-14 PROCEDURE — 1123F ACP DISCUSS/DSCN MKR DOCD: CPT | Performed by: FAMILY MEDICINE

## 2020-02-14 PROCEDURE — G8417 CALC BMI ABV UP PARAM F/U: HCPCS | Performed by: FAMILY MEDICINE

## 2020-02-14 PROCEDURE — G8484 FLU IMMUNIZE NO ADMIN: HCPCS | Performed by: FAMILY MEDICINE

## 2020-02-14 PROCEDURE — G8427 DOCREV CUR MEDS BY ELIG CLIN: HCPCS | Performed by: FAMILY MEDICINE

## 2020-02-14 PROCEDURE — 99214 OFFICE O/P EST MOD 30 MIN: CPT | Performed by: FAMILY MEDICINE

## 2020-02-14 PROCEDURE — 4040F PNEUMOC VAC/ADMIN/RCVD: CPT | Performed by: FAMILY MEDICINE

## 2020-02-14 PROCEDURE — 1090F PRES/ABSN URINE INCON ASSESS: CPT | Performed by: FAMILY MEDICINE

## 2020-02-14 RX ORDER — CLONAZEPAM 1 MG/1
TABLET ORAL
Qty: 90 TABLET | Refills: 0 | Status: SHIPPED | OUTPATIENT
Start: 2020-02-14 | End: 2020-07-14

## 2020-02-14 RX ORDER — CLOTRIMAZOLE AND BETAMETHASONE DIPROPIONATE 10; .64 MG/G; MG/G
CREAM TOPICAL
Qty: 15 G | Refills: 1 | Status: SHIPPED | OUTPATIENT
Start: 2020-02-14 | End: 2021-03-18

## 2020-02-14 RX ORDER — HYDROCODONE BITARTRATE AND ACETAMINOPHEN 7.5; 325 MG/1; MG/1
1 TABLET ORAL EVERY 6 HOURS PRN
Qty: 20 TABLET | Refills: 0 | Status: SHIPPED | OUTPATIENT
Start: 2020-02-14 | End: 2020-02-19

## 2020-02-14 ASSESSMENT — PATIENT HEALTH QUESTIONNAIRE - PHQ9
SUM OF ALL RESPONSES TO PHQ9 QUESTIONS 1 & 2: 0
SUM OF ALL RESPONSES TO PHQ QUESTIONS 1-9: 0
2. FEELING DOWN, DEPRESSED OR HOPELESS: 0
SUM OF ALL RESPONSES TO PHQ QUESTIONS 1-9: 0
1. LITTLE INTEREST OR PLEASURE IN DOING THINGS: 0

## 2020-02-14 ASSESSMENT — ENCOUNTER SYMPTOMS
CHEST TIGHTNESS: 0
DIARRHEA: 0
CONSTIPATION: 0
RHINORRHEA: 0

## 2020-02-14 NOTE — PROGRESS NOTES
SUBJECTIVE:    Tammy Cameron is a 66 y.o. female who presents for a follow up visit. Chief Complaint   Patient presents with    Follow-up     Patient is here for a follow up. Would like handicap lucio. Hyperlipidemia   This is a chronic problem. The current episode started more than 1 year ago. The problem is controlled. Lipid results: Total cholesterol 279, triglycerides 104, HDL 81, . Factors aggravating her hyperlipidemia include beta blockers and fatty foods. Pertinent negatives include no chest pain. She is currently on no antihyperlipidemic treatment. Compliance problems include adherence to diet and adherence to exercise. Risk factors for coronary artery disease include dyslipidemia, hypertension and a sedentary lifestyle. Hypertension   This is a chronic problem. The current episode started more than 1 year ago. The problem is controlled. Pertinent negatives include no chest pain. Agents associated with hypertension include NSAIDs. Risk factors for coronary artery disease include dyslipidemia and sedentary lifestyle. Past treatments include ACE inhibitors, beta blockers and diuretics. The current treatment provides significant improvement. Compliance problems include exercise and diet. Hip Pain    Incident onset: Chronic. There was no injury mechanism. The pain is present in the left knee and left hip. The quality of the pain is described as aching. The pain is moderate. Pertinent negatives include no numbness or tingling. The symptoms are aggravated by weight bearing (prolonged standing). She has tried acetaminophen and NSAIDs for the symptoms. The treatment provided mild relief. Knee Pain    There was no injury mechanism. The pain is present in the left hip and left knee. The quality of the pain is described as stabbing and aching. The pain is moderate. The pain has been constant since onset. Pertinent negatives include no numbness or tingling.  The symptoms are aggravated cream; Apply topically 2 times daily. Primary insomnia  -     clonazePAM (KLONOPIN) 1 MG tablet; TAKE 1 TABLET NIGHTLY AS   NEEDED FOR (SLEEP)    Mixed hyperlipidemia  -     Comprehensive Metabolic Panel, Fasting; Future  -     Lipid, Fasting; Future    Generalized osteoarthrosis, involving multiple sites  -     HYDROcodone-acetaminophen (NORCO) 7.5-325 MG per tablet; Take 1 tablet by mouth every 6 hours as needed for Pain for up to 5 days. Intended supply: 5 days. Take lowest dose possible to manage pain  Patient has been followed by Ortho. She states the orthopedist would not give her anything for pain. I suggested she find another orthopedist.  She will continue the Mobic but she can double the dose from 7.5 to 15 mg/day. Coronary Calcium Score Suggested. There is a strong family history of coronary artery disease. Return in about 4 months (around 6/14/2020). Please note portions of this note were completed with a voicerecognition program.  Efforts were made to edit the dictations but occasionally words are mis-transcribed.

## 2020-03-02 ASSESSMENT — ENCOUNTER SYMPTOMS
GASTROINTESTINAL NEGATIVE: 1
EYES NEGATIVE: 1
RESPIRATORY NEGATIVE: 1

## 2020-03-02 NOTE — PROGRESS NOTES
PCP:  Medical Oncology:  Radiation:  Other:    CC: history of left breast cancer, 2008      Ms. Be Schneider is a 66y.o.-year-old woman who presents today in follow up for breast evaluation. She has a history of breast cancer but does need to change her surveillance follow up due to her provider retiring. In 2008 she was treated for an invasive ductal carcinoma. She reports having a left mastectomy with node evaluation. She required chemotherapy with Herceptin. She did not undergo radiation and did not take anti-estrogens. INTERVAL HISTORY:  She has no new complaints or general medical concerns. She has not noticed any new breast masses or skin changes including redness or dimpling. She has not noticed any nipple discharge. On 7/20/2017 she underwent right breast diagnostic imaging. There is a focal grouping of coarse calcifications in the right breast that are considered low suspicion for malignancy. They're likely involutional. BI-RADS 3. On 2/2/2018 she underwent right breast diagnostic mammography. Benign calcifications were again noted with a focal grouping of the lateral right breast. This appears unchanged. There is no associated mass or distortion. BI-RADS 2. On 2/4/2019 she underwent unilateral right breast imaging. Stable changes including calcifications are noted in the right breast. There are no new concerning findings suggestive of malignancy. BI-RADS 2. On 3/6/2020 she underwent unilateral right screening mammogram.  Postsurgical changes are noted in the right breast.  Stable calcifications in the right breast are without change over multiple prior exams. There are no new concerning findings suggestive of malignancy. BI-RADS 2.         Past Medical History:   Diagnosis Date    Cancer [facility])     breast, left    Congenital hernia of the diaphragm     Hyperlipidemia     Hypertension      Past Surgical History:   Procedure Laterality Date    APPENDECTOMY      BREAST SURGERY left mastectomy    EYE SURGERY      HERNIA REPAIR      lapascopic hiatal hernia repair    TOTAL KNEE ARTHROPLASTY  2016    cangemi    TUBAL LIGATION       Review of Systems   Constitutional: Negative. HENT: Negative. Eyes: Negative. Respiratory: Negative. Cardiovascular: Negative. Gastrointestinal: Negative. Genitourinary: Negative. Musculoskeletal: Negative. Skin: Negative. Neurological: Negative. Endo/Heme/Allergies: Negative. Psychiatric/Behavioral: Negative. All other systems reviewed and are negative. Family History:  Maternal grandmother: Uterine cancer  Paternal aunt: breast cancer  No additional family history of breast or ovarian cancer    Hormonal History:   a.0  m.0  Menarche at age 4. Postmenopausal at age43  Hysterectomy: no hysterectomy  No estrogen supplementation  OCP not taking    Medications:  Medication documentation has been reviewed in the electronic medical record and patient office intake form. Exam:  There were no vitals taken for this visit. Physical exam was reviewed and updated where necessary. Constitutional: She appears well-nourished. No apparent distress. Breast: The patient was examined in the upright and supine position. She has a right-sided \"C\" cup breast. The left breast has been surgically removed. There is minimal redundant skin. Right: There were no new masses or changes in breast contour. There were no skin changes of the breast or nipple areolar complex. There was no nipple inversion or discharge. Left: There were no new masses or changes in chest wall contour. There were no skin changes of the chest wall. There is no axillary lymphadenopathy palpated bilaterally. Head: Normocephalic and atraumatic. Eyes: EOM are normal. Pupils are equal, round, and reactive to light. Neck: Neck supple. No tracheal deviation present. Cardiovascular: regular rate.   Extremities appear well perfused. Pulmonary: respirations are non-labored and without audible distress  Lymphatics: no palpable axillary or cervical lymphadenopathy. Skin: No rash noted. No erythema. Neurologic: alert and oriented. Assessment/Plan: Ms. Gregory Gracia is a 66y.o. year-old woman who presents for routine follow-up for her history of left breast cancer, 2008. I reviewed her most recent breast imaging and physical exam findings. There are no concerning signs for recurrence of this time. Screening recommendations were reviewed. Self breast evaluation was reviewed. Signs and symptoms of concern were reviewed. She verbalizes understanding that she should notify our office if she identifies any abnormalities on self evaluation as it may require further workup. At this time: We will see Ms. Barrera back in the office in 1 year for a clinical breast exam.  She will be due for a unilateral right screening mammogram at that time which we will schedule for her. All of Ms. Barrera's questions were answered at this time but she was encouraged to call the office with any additional concerns. Approximately 15 minutes of time were spent in this visit of which 50% or more of the time was related to coordination of care.

## 2020-03-06 ENCOUNTER — OFFICE VISIT (OUTPATIENT)
Dept: SURGERY | Age: 75
End: 2020-03-06
Payer: MEDICARE

## 2020-03-06 ENCOUNTER — HOSPITAL ENCOUNTER (OUTPATIENT)
Dept: WOMENS IMAGING | Age: 75
Discharge: HOME | End: 2020-03-06
Payer: MEDICARE

## 2020-03-06 VITALS
HEART RATE: 59 BPM | DIASTOLIC BLOOD PRESSURE: 77 MMHG | HEIGHT: 64 IN | TEMPERATURE: 97.8 F | SYSTOLIC BLOOD PRESSURE: 126 MMHG | BODY MASS INDEX: 31.62 KG/M2 | WEIGHT: 185.2 LBS

## 2020-03-06 PROCEDURE — 99213 OFFICE O/P EST LOW 20 MIN: CPT | Performed by: SURGERY

## 2020-03-06 PROCEDURE — 1123F ACP DISCUSS/DSCN MKR DOCD: CPT | Performed by: SURGERY

## 2020-03-06 PROCEDURE — 1090F PRES/ABSN URINE INCON ASSESS: CPT | Performed by: SURGERY

## 2020-03-06 PROCEDURE — G8427 DOCREV CUR MEDS BY ELIG CLIN: HCPCS | Performed by: SURGERY

## 2020-03-06 PROCEDURE — G8417 CALC BMI ABV UP PARAM F/U: HCPCS | Performed by: SURGERY

## 2020-03-06 PROCEDURE — 77063 BREAST TOMOSYNTHESIS BI: CPT

## 2020-03-06 PROCEDURE — G8399 PT W/DXA RESULTS DOCUMENT: HCPCS | Performed by: SURGERY

## 2020-03-06 PROCEDURE — G8484 FLU IMMUNIZE NO ADMIN: HCPCS | Performed by: SURGERY

## 2020-03-06 PROCEDURE — 4040F PNEUMOC VAC/ADMIN/RCVD: CPT | Performed by: SURGERY

## 2020-03-06 PROCEDURE — 1036F TOBACCO NON-USER: CPT | Performed by: SURGERY

## 2020-06-03 RX ORDER — RALOXIFENE HYDROCHLORIDE 60 MG/1
TABLET, FILM COATED ORAL
Qty: 90 TABLET | Refills: 1 | Status: SHIPPED | OUTPATIENT
Start: 2020-06-03 | End: 2021-01-07 | Stop reason: SDUPTHER

## 2020-06-17 ENCOUNTER — OFFICE VISIT (OUTPATIENT)
Dept: FAMILY MEDICINE CLINIC | Age: 75
End: 2020-06-17
Payer: MEDICARE

## 2020-06-17 VITALS
BODY MASS INDEX: 31.41 KG/M2 | SYSTOLIC BLOOD PRESSURE: 120 MMHG | WEIGHT: 183 LBS | TEMPERATURE: 99.5 F | DIASTOLIC BLOOD PRESSURE: 86 MMHG

## 2020-06-17 PROBLEM — F41.9 ANXIETY: Status: ACTIVE | Noted: 2020-06-17

## 2020-06-17 PROCEDURE — 1036F TOBACCO NON-USER: CPT | Performed by: FAMILY MEDICINE

## 2020-06-17 PROCEDURE — 1123F ACP DISCUSS/DSCN MKR DOCD: CPT | Performed by: FAMILY MEDICINE

## 2020-06-17 PROCEDURE — 99214 OFFICE O/P EST MOD 30 MIN: CPT | Performed by: FAMILY MEDICINE

## 2020-06-17 PROCEDURE — 1090F PRES/ABSN URINE INCON ASSESS: CPT | Performed by: FAMILY MEDICINE

## 2020-06-17 PROCEDURE — G8399 PT W/DXA RESULTS DOCUMENT: HCPCS | Performed by: FAMILY MEDICINE

## 2020-06-17 PROCEDURE — 4040F PNEUMOC VAC/ADMIN/RCVD: CPT | Performed by: FAMILY MEDICINE

## 2020-06-17 PROCEDURE — G8427 DOCREV CUR MEDS BY ELIG CLIN: HCPCS | Performed by: FAMILY MEDICINE

## 2020-06-17 PROCEDURE — G8417 CALC BMI ABV UP PARAM F/U: HCPCS | Performed by: FAMILY MEDICINE

## 2020-06-17 ASSESSMENT — ENCOUNTER SYMPTOMS
SHORTNESS OF BREATH: 0
SINUS PAIN: 0
CONSTIPATION: 0
DIARRHEA: 1
ABDOMINAL PAIN: 0
RHINORRHEA: 0
CHEST TIGHTNESS: 0
BACK PAIN: 1

## 2020-06-17 NOTE — PROGRESS NOTES
Take 1 tablet by mouth daily. No current facility-administered medications on file prior to visit. Review of Systems   HENT: Negative for congestion, postnasal drip, rhinorrhea and sinus pain. Respiratory: Negative for chest tightness and shortness of breath. Cardiovascular: Negative for chest pain, palpitations and leg swelling. Gastrointestinal: Positive for diarrhea ( occ'l). Negative for abdominal pain and constipation. Genitourinary: Negative for difficulty urinating. Musculoskeletal: Positive for arthralgias ( Left knee and hip) and back pain. Neurological: Negative for dizziness, light-headedness and headaches. Psychiatric/Behavioral: Positive for sleep disturbance. Negative for dysphoric mood. The patient is nervous/anxious. OBJECTIVE:    /86   Temp 99.5 °F (37.5 °C)   Wt 183 lb (83 kg)   BMI 31.41 kg/m²    Physical Exam  Constitutional:       Appearance: She is well-developed. She is obese. HENT:      Head: Normocephalic and atraumatic. Right Ear: External ear normal.      Left Ear: External ear normal.      Nose: Nose normal.   Neck:      Musculoskeletal: Normal range of motion and neck supple. Thyroid: No thyromegaly. Vascular: No JVD. Trachea: No tracheal deviation. Cardiovascular:      Rate and Rhythm: Normal rate and regular rhythm. Heart sounds: Normal heart sounds. Pulmonary:      Effort: Pulmonary effort is normal. No respiratory distress. Breath sounds: Normal breath sounds. No rales. Musculoskeletal:      Right lower leg: No edema. Left lower leg: No edema. Lymphadenopathy:      Cervical: No cervical adenopathy. Skin:     General: Skin is warm and dry. Neurological:      Mental Status: She is alert and oriented to person, place, and time. Psychiatric:         Mood and Affect: Mood normal.         Behavior: Behavior normal.         ASSESSMENT/PLAN:    Nazia Ace was seen today for follow-up.     Diagnoses

## 2020-07-14 RX ORDER — CLONAZEPAM 1 MG/1
TABLET ORAL
Qty: 90 TABLET | Refills: 0 | Status: SHIPPED | OUTPATIENT
Start: 2020-07-14 | End: 2020-10-27 | Stop reason: SDUPTHER

## 2020-08-25 ENCOUNTER — TELEPHONE (OUTPATIENT)
Dept: FAMILY MEDICINE CLINIC | Age: 75
End: 2020-08-25

## 2020-08-25 NOTE — TELEPHONE ENCOUNTER
----- Message from Beverly Soto sent at 8/25/2020  2:15 PM EDT -----  Subject: Message to Provider    QUESTIONS  Information for Provider? Patient was seen in urgent care for jaw soreness  dx poss. infected tooth start antibiotics/ Dentist follow up   dx TMJ inflammation   no infection. Pt. is asking if she can stop taking the antibiotics   prescribed at Urgent Care. Please advise.  ---------------------------------------------------------------------------  --------------  CALL BACK INFO  What is the best way for the office to contact you? OK to leave message on   voicemail  Preferred Call Back Phone Number? [phone]  ---------------------------------------------------------------------------  --------------  SCRIPT ANSWERS  Relationship to Patient?  Self

## 2020-08-25 NOTE — TELEPHONE ENCOUNTER
----- Message from Dang Carson sent at 8/25/2020  2:15 PM EDT -----  Subject: Message to Provider    QUESTIONS  Information for Provider? Patient was seen in urgent care for jaw soreness  dx poss. infected tooth start antibiotics/ Dentist follow up   dx TMJ inflammation   no infection. Pt. is asking if she can stop taking the antibiotics   prescribed at Urgent Care. Please advise.  ---------------------------------------------------------------------------  --------------  CALL BACK INFO  What is the best way for the office to contact you? OK to leave message on   voicemail  Preferred Call Back Phone Number? [phone]  ---------------------------------------------------------------------------  --------------  SCRIPT ANSWERS  Relationship to Patient?  Self

## 2020-10-05 RX ORDER — MELOXICAM 7.5 MG/1
TABLET ORAL
Qty: 90 TABLET | Refills: 3 | Status: SHIPPED | OUTPATIENT
Start: 2020-10-05 | End: 2021-07-13

## 2020-10-19 RX ORDER — HYDROCHLOROTHIAZIDE 25 MG/1
TABLET ORAL
Qty: 90 TABLET | Refills: 3 | Status: SHIPPED | OUTPATIENT
Start: 2020-10-19 | End: 2021-09-29 | Stop reason: SDUPTHER

## 2020-10-19 RX ORDER — ATENOLOL 100 MG/1
TABLET ORAL
Qty: 90 TABLET | Refills: 3 | Status: SHIPPED | OUTPATIENT
Start: 2020-10-19 | End: 2021-09-29 | Stop reason: SDUPTHER

## 2020-10-19 NOTE — TELEPHONE ENCOUNTER
Medication:   Requested Prescriptions     Pending Prescriptions Disp Refills    atenolol (TENORMIN) 100 MG tablet [Pharmacy Med Name: ATENOLOL TAB 100MG] 90 tablet 3     Sig: TAKE 1 TABLET DAILY    hydroCHLOROthiazide (HYDRODIURIL) 25 MG tablet [Pharmacy Med Name: HYDROCHLOROT TAB 25MG] 90 tablet 3     Sig: TAKE 1 TABLET DAILY      Last Filled:      Patient Phone Number: [phone] (home)     Last appt: 6/17/2020   Next appt: 12/17/2020    Last OARRS:   RX Monitoring 12/13/2019   Attestation -   Periodic Controlled Substance Monitoring No signs of potential drug abuse or diversion identified.      PDMP Monitoring:    Last PDMP [facility]):  Review User Review Instant Review Result          Preferred Pharmacy:   [facility] [address] [place] [phone] - F [phone]  [place] [address]  Phone: [phone] Fax: [phone]

## 2020-10-26 NOTE — TELEPHONE ENCOUNTER
Medication:   Requested Prescriptions     Pending Prescriptions Disp Refills    clonazePAM (KLONOPIN) 1 MG tablet 90 tablet 0     Sig: TAKE 1 TABLET NIGHTLY AS   NEEDED FOR SLEEP      Last Filled: 7/14/2020    Patient Phone Number: [phone] (home)     Last appt: 6/17/2020   Next appt: 12/17/2020    Last OARRS:   RX Monitoring 12/13/2019   Attestation -   Periodic Controlled Substance Monitoring No signs of potential drug abuse or diversion identified. PDMP Monitoring:    Last PDMP Bety Drew as Reviewed [facility]):  Review User Review Instant Review Result          Preferred Pharmacy:    [address], [address] [place] [address] [phone] [facility] [phone]  [place] [address]  Phone: [phone] Fax: [phone]    [address] - P [phone] - F [phone]  [facility] [place] [address]  Phone: [phone] Fax: [phone]

## 2020-10-27 RX ORDER — CLONAZEPAM 1 MG/1
TABLET ORAL
Qty: 90 TABLET | Refills: 2 | Status: SHIPPED | OUTPATIENT
Start: 2020-10-27 | End: 2021-05-17 | Stop reason: SDUPTHER

## 2020-12-09 RX ORDER — LISINOPRIL 40 MG/1
TABLET ORAL
Qty: 90 TABLET | Refills: 0 | Status: SHIPPED | OUTPATIENT
Start: 2020-12-09 | End: 2021-03-17

## 2020-12-17 ENCOUNTER — OFFICE VISIT (OUTPATIENT)
Dept: FAMILY MEDICINE CLINIC | Age: 75
End: 2020-12-17
Payer: MEDICARE

## 2020-12-17 VITALS
SYSTOLIC BLOOD PRESSURE: 120 MMHG | TEMPERATURE: 96.6 F | DIASTOLIC BLOOD PRESSURE: 70 MMHG | WEIGHT: 194 LBS | BODY MASS INDEX: 33.3 KG/M2

## 2020-12-17 PROCEDURE — 90694 VACC AIIV4 NO PRSRV 0.5ML IM: CPT | Performed by: FAMILY MEDICINE

## 2020-12-17 PROCEDURE — G8484 FLU IMMUNIZE NO ADMIN: HCPCS | Performed by: FAMILY MEDICINE

## 2020-12-17 PROCEDURE — G0008 ADMIN INFLUENZA VIRUS VAC: HCPCS | Performed by: FAMILY MEDICINE

## 2020-12-17 PROCEDURE — 1123F ACP DISCUSS/DSCN MKR DOCD: CPT | Performed by: FAMILY MEDICINE

## 2020-12-17 PROCEDURE — G8399 PT W/DXA RESULTS DOCUMENT: HCPCS | Performed by: FAMILY MEDICINE

## 2020-12-17 PROCEDURE — 99213 OFFICE O/P EST LOW 20 MIN: CPT | Performed by: FAMILY MEDICINE

## 2020-12-17 PROCEDURE — G8427 DOCREV CUR MEDS BY ELIG CLIN: HCPCS | Performed by: FAMILY MEDICINE

## 2020-12-17 PROCEDURE — G8417 CALC BMI ABV UP PARAM F/U: HCPCS | Performed by: FAMILY MEDICINE

## 2020-12-17 PROCEDURE — 1090F PRES/ABSN URINE INCON ASSESS: CPT | Performed by: FAMILY MEDICINE

## 2020-12-17 PROCEDURE — 1036F TOBACCO NON-USER: CPT | Performed by: FAMILY MEDICINE

## 2020-12-17 PROCEDURE — 4040F PNEUMOC VAC/ADMIN/RCVD: CPT | Performed by: FAMILY MEDICINE

## 2020-12-17 RX ORDER — MIRTAZAPINE 15 MG/1
15 TABLET, FILM COATED ORAL NIGHTLY
Qty: 90 TABLET | Refills: 1 | Status: SHIPPED | OUTPATIENT
Start: 2020-12-17 | End: 2021-03-18

## 2020-12-17 ASSESSMENT — ENCOUNTER SYMPTOMS
CONSTIPATION: 0
BACK PAIN: 1
DIARRHEA: 0
RHINORRHEA: 0
SHORTNESS OF BREATH: 0
CHEST TIGHTNESS: 0

## 2020-12-17 NOTE — PROGRESS NOTES
Vaccine Information Sheet, \"Influenza - Inactivated\"  given to Diego Arango, or parent/legal guardian of  Diego Arango and verbalized understanding. Patient responses:    Have you ever had a reaction to a flu vaccine? No  Do you have any current illness? No  Have you ever had Guillian Waverly Syndrome? No  Do you have a serious allergy to any of the follow: Neomycin, Polymyxin, Thimerosal, eggs or egg products? No    Flu vaccine given per order. Please see immunization tab. Risks and benefits explained. Current VIS given.

## 2021-01-07 DIAGNOSIS — M85.80 OSTEOPENIA, UNSPECIFIED LOCATION: ICD-10-CM

## 2021-01-07 RX ORDER — RALOXIFENE HYDROCHLORIDE 60 MG/1
60 TABLET, FILM COATED ORAL DAILY
Qty: 90 TABLET | Refills: 1 | Status: SHIPPED | OUTPATIENT
Start: 2021-01-07 | End: 2021-06-17 | Stop reason: SDUPTHER

## 2021-02-08 ENCOUNTER — TELEPHONE (OUTPATIENT)
Dept: FAMILY MEDICINE CLINIC | Age: 76
End: 2021-02-08

## 2021-03-16 DIAGNOSIS — I10 ESSENTIAL HYPERTENSION: ICD-10-CM

## 2021-03-17 RX ORDER — LISINOPRIL 40 MG/1
TABLET ORAL
Qty: 90 TABLET | Refills: 0 | Status: SHIPPED | OUTPATIENT
Start: 2021-03-17 | End: 2021-06-17 | Stop reason: SDUPTHER

## 2021-03-18 ENCOUNTER — OFFICE VISIT (OUTPATIENT)
Dept: FAMILY MEDICINE CLINIC | Age: 76
End: 2021-03-18
Payer: MEDICARE

## 2021-03-18 VITALS
BODY MASS INDEX: 33.29 KG/M2 | DIASTOLIC BLOOD PRESSURE: 80 MMHG | TEMPERATURE: 96.8 F | HEIGHT: 64 IN | SYSTOLIC BLOOD PRESSURE: 118 MMHG | WEIGHT: 195 LBS

## 2021-03-18 DIAGNOSIS — E78.2 MIXED HYPERLIPIDEMIA: ICD-10-CM

## 2021-03-18 DIAGNOSIS — R73.9 HYPERGLYCEMIA: ICD-10-CM

## 2021-03-18 DIAGNOSIS — F41.9 ANXIETY: Primary | ICD-10-CM

## 2021-03-18 PROCEDURE — G8427 DOCREV CUR MEDS BY ELIG CLIN: HCPCS | Performed by: FAMILY MEDICINE

## 2021-03-18 PROCEDURE — 1123F ACP DISCUSS/DSCN MKR DOCD: CPT | Performed by: FAMILY MEDICINE

## 2021-03-18 PROCEDURE — 99214 OFFICE O/P EST MOD 30 MIN: CPT | Performed by: FAMILY MEDICINE

## 2021-03-18 PROCEDURE — G8417 CALC BMI ABV UP PARAM F/U: HCPCS | Performed by: FAMILY MEDICINE

## 2021-03-18 PROCEDURE — G8399 PT W/DXA RESULTS DOCUMENT: HCPCS | Performed by: FAMILY MEDICINE

## 2021-03-18 PROCEDURE — 3288F FALL RISK ASSESSMENT DOCD: CPT | Performed by: FAMILY MEDICINE

## 2021-03-18 PROCEDURE — 1036F TOBACCO NON-USER: CPT | Performed by: FAMILY MEDICINE

## 2021-03-18 PROCEDURE — 1090F PRES/ABSN URINE INCON ASSESS: CPT | Performed by: FAMILY MEDICINE

## 2021-03-18 PROCEDURE — 4040F PNEUMOC VAC/ADMIN/RCVD: CPT | Performed by: FAMILY MEDICINE

## 2021-03-18 PROCEDURE — G8484 FLU IMMUNIZE NO ADMIN: HCPCS | Performed by: FAMILY MEDICINE

## 2021-03-18 ASSESSMENT — PATIENT HEALTH QUESTIONNAIRE - PHQ9
2. FEELING DOWN, DEPRESSED OR HOPELESS: 0
SUM OF ALL RESPONSES TO PHQ QUESTIONS 1-9: 0
1. LITTLE INTEREST OR PLEASURE IN DOING THINGS: 0

## 2021-03-18 ASSESSMENT — ENCOUNTER SYMPTOMS
CONSTIPATION: 0
BACK PAIN: 1
CHEST TIGHTNESS: 0
SHORTNESS OF BREATH: 0
DIARRHEA: 1

## 2021-03-18 NOTE — PROGRESS NOTES
SUBJECTIVE:    Redgie Dakins is a 66 y.o. female who presents for a follow up visit. Chief Complaint   Patient presents with    Follow-up     Patient is here for a follow up. No lab. No new concerns. Knee Pain   Incident onset: Chronic. There was no injury mechanism. The pain is present in the left knee. The quality of the pain is described as aching. The pain is mild. The pain has been constant since onset. Treatments tried: Recent steroid injection improved sx. The treatment provided moderate relief. Anxiety  Last visit patient was tried on Remeron 15 mg at at bedtime to help with her symptoms of anxiety and depression. She stopped it after a few days because she felt worse. She continues to take clonazepam as this helps her with sleep as well as anxiety. Patient's medications, allergies, past medical,surgical, social and family histories were reviewed and updated as appropriate.      Past Medical History:   Diagnosis Date    Cancer [facility])     breast, left    Congenital hernia of the diaphragm     Hyperlipidemia     Hypertension      Past Surgical History:   Procedure Laterality Date    APPENDECTOMY      BACK SURGERY  11/09/2020    several back injections    BREAST SURGERY      left mastectomy    EYE SURGERY      HERNIA REPAIR      lapascopic hiatal hernia repair    TOTAL KNEE ARTHROPLASTY  04/2016    cangemi-  multiple cortison injections - left knee    TUBAL LIGATION       Family History   Problem Relation Age of Onset    High Blood Pressure Mother     Heart Disease Mother     High Blood Pressure Sister     Diabetes Sister     High Blood Pressure Brother     Heart Disease Brother     Cancer Maternal Uncle     Breast Cancer Paternal Aunt     Uterine Cancer Maternal Grandmother      Social History     Tobacco Use    Smoking status: Never Smoker    Smokeless tobacco: Never Used   Substance Use Topics    Alcohol use: No     Alcohol/week: 0.0 standard drinks

## 2021-03-25 ASSESSMENT — ENCOUNTER SYMPTOMS
EYES NEGATIVE: 1
RESPIRATORY NEGATIVE: 1
GASTROINTESTINAL NEGATIVE: 1

## 2021-03-25 NOTE — PROGRESS NOTES
PCP:  Medical Oncology:  Radiation:  Other:    CC: history of left breast cancer, 2008      Ms. Maria Del Rosario Hyatt is a 64y.o.-year-old woman who presents today in follow up for breast evaluation. She has a history of breast cancer but does need to change her surveillance follow up due to her provider retiring. In 2008 she was treated for an invasive ductal carcinoma. She reports having a left mastectomy with node evaluation. She required chemotherapy with Herceptin. She did not undergo radiation and did not take anti-estrogens. Since her last encounter she has had back surgery. She has no new breast related concerns today. INTERVAL HISTORY:  She has no new complaints or general medical concerns. She has not noticed any new breast masses or skin changes including redness or dimpling. She has not noticed any nipple discharge. On 7/20/2017 she underwent right breast diagnostic imaging. There is a focal grouping of coarse calcifications in the right breast that are considered low suspicion for malignancy. They're likely involutional. BI-RADS 3. On 2/2/2018 she underwent right breast diagnostic mammography. Benign calcifications were again noted with a focal grouping of the lateral right breast. This appears unchanged. There is no associated mass or distortion. BI-RADS 2. On 2/4/2019 she underwent unilateral right breast imaging. Stable changes including calcifications are noted in the right breast. There are no new concerning findings suggestive of malignancy. BI-RADS 2. On 3/6/2020 she underwent unilateral right screening mammogram.  Postsurgical changes are noted in the right breast.  Stable calcifications in the right breast are without change over multiple prior exams. There are no new concerning findings suggestive of malignancy. BI-RADS 2. On 4/1/2021 she underwent right screening mammogram.  There are no new concerning findings suggestive of malignancy. BI-RADS 2.         Past Medical History:   Diagnosis Date    Cancer ([address]     breast, left    Congenital hernia of the diaphragm     Hyperlipidemia     Hypertension      Past Surgical History:   Procedure Laterality Date    APPENDECTOMY      BACK SURGERY  2020    several back injections    BREAST SURGERY      left mastectomy    EYE SURGERY      HERNIA REPAIR      lapascopic hiatal hernia repair    TOTAL KNEE ARTHROPLASTY  2016    cangemi-  multiple cortison injections - left knee    TUBAL LIGATION       Review of Systems   Constitutional: Negative. HENT: Negative. Eyes: Negative. Respiratory: Negative. Cardiovascular: Negative. Gastrointestinal: Negative. Genitourinary: Negative. Musculoskeletal: Negative. Skin: Negative. Neurological: Negative. Endo/Heme/Allergies: Negative. Psychiatric/Behavioral: Negative. All other systems reviewed and are negative. Family History:  Maternal grandmother: Uterine cancer  Paternal aunt: breast cancer  No additional family history of breast or ovarian cancer    Hormonal History:   a.0  m.0  Menarche at age 4. Postmenopausal at age41  Hysterectomy: no hysterectomy  No estrogen supplementation  OCP not taking    Medications:  Medication documentation has been reviewed in the electronic medical record and patient office intake form. Exam:  There were no vitals taken for this visit. Physical exam was reviewed and updated where necessary. Constitutional: She appears well-nourished. No apparent distress. Breast: The patient was examined in the upright and supine position. She has a right-sided \"C\" cup breast. The left breast has been surgically removed. There is minimal redundant skin. Right: There were no new masses or changes in breast contour. There were no skin changes of the breast or nipple areolar complex. There was no nipple inversion or discharge.         Left: There were no new masses or changes in chest wall contour. There were no skin changes of the chest wall. There is no axillary lymphadenopathy palpated bilaterally. Head: Normocephalic and atraumatic. Eyes: EOM are normal. Pupils are equal, round, and reactive to light. Neck: Neck supple. No tracheal deviation present. Cardiovascular: regular rate. Extremities appear well perfused. Pulmonary: respirations are non-labored and without audible distress  Lymphatics: no palpable axillary or cervical lymphadenopathy. Skin: No rash noted. No erythema. Neurologic: alert and oriented. Assessment/Plan: Ms. Maria Del Rosario Hyatt is a 64y.o. year-old woman who presents for routine follow-up for her history of left breast cancer, 2008. I reviewed her most recent breast imaging and physical exam findings. I answered her questions pertaining to redundant tissue and very low likelihood of recurrent malignancy. There are no concerning signs for recurrence of this time. We discussed timing of Covid vaccine in regards to her mammogram.    I provided her prescription for a breast prosthetic and offered her resources for fitting and purchase. Screening recommendations were reviewed. Self breast evaluation was reviewed. Signs and symptoms of concern were reviewed. She verbalizes understanding that she should notify our office if she identifies any abnormalities on self evaluation as it may require further workup. At this time: We will see Ms. Barrera back in the office in 1 year for a clinical breast exam.  She will be due for a unilateral right screening mammogram at that time which we will schedule for her. All of the patient's questions were answered at this time however, she was encouraged to call the office with any further inquiries. Approximately 20 minutes of time were spent in preparation, direct patient contact, care coordination, documentation and activities otherwise related to this encounter.

## 2021-04-01 ENCOUNTER — HOSPITAL ENCOUNTER (OUTPATIENT)
Dept: WOMENS IMAGING | Age: 76
Discharge: HOME | End: 2021-04-01
Payer: MEDICARE

## 2021-04-01 ENCOUNTER — OFFICE VISIT (OUTPATIENT)
Dept: SURGERY | Age: 76
End: 2021-04-01
Payer: MEDICARE

## 2021-04-01 VITALS
WEIGHT: 195 LBS | OXYGEN SATURATION: 95 % | DIASTOLIC BLOOD PRESSURE: 72 MMHG | BODY MASS INDEX: 33.47 KG/M2 | SYSTOLIC BLOOD PRESSURE: 120 MMHG | TEMPERATURE: 97.2 F | HEART RATE: 64 BPM

## 2021-04-01 DIAGNOSIS — Z85.3 ENCOUNTER FOR FOLLOW-UP SURVEILLANCE OF BREAST CANCER: ICD-10-CM

## 2021-04-01 DIAGNOSIS — Z12.31 SCREENING MAMMOGRAM, ENCOUNTER FOR: ICD-10-CM

## 2021-04-01 DIAGNOSIS — Z12.39 SCREENING BREAST EXAMINATION: ICD-10-CM

## 2021-04-01 DIAGNOSIS — Z08 ENCOUNTER FOR FOLLOW-UP SURVEILLANCE OF BREAST CANCER: ICD-10-CM

## 2021-04-01 DIAGNOSIS — Z85.3 PERSONAL HISTORY OF BREAST CANCER: Primary | ICD-10-CM

## 2021-04-01 PROCEDURE — 77063 BREAST TOMOSYNTHESIS BI: CPT

## 2021-04-01 PROCEDURE — 1090F PRES/ABSN URINE INCON ASSESS: CPT | Performed by: SURGERY

## 2021-04-01 PROCEDURE — 1036F TOBACCO NON-USER: CPT | Performed by: SURGERY

## 2021-04-01 PROCEDURE — G8417 CALC BMI ABV UP PARAM F/U: HCPCS | Performed by: SURGERY

## 2021-04-01 PROCEDURE — 4040F PNEUMOC VAC/ADMIN/RCVD: CPT | Performed by: SURGERY

## 2021-04-01 PROCEDURE — G8399 PT W/DXA RESULTS DOCUMENT: HCPCS | Performed by: SURGERY

## 2021-04-01 PROCEDURE — G8427 DOCREV CUR MEDS BY ELIG CLIN: HCPCS | Performed by: SURGERY

## 2021-04-01 PROCEDURE — 1123F ACP DISCUSS/DSCN MKR DOCD: CPT | Performed by: SURGERY

## 2021-04-01 PROCEDURE — 99213 OFFICE O/P EST LOW 20 MIN: CPT | Performed by: SURGERY

## 2021-04-06 DIAGNOSIS — Z85.3 PERSONAL HISTORY OF MALIGNANT NEOPLASM OF BREAST: Primary | ICD-10-CM

## 2021-04-06 DIAGNOSIS — R92.2 DENSE BREAST TISSUE: ICD-10-CM

## 2021-04-06 DIAGNOSIS — Z12.31 SCREENING MAMMOGRAM FOR HIGH-RISK PATIENT: ICD-10-CM

## 2021-05-17 DIAGNOSIS — F51.01 PRIMARY INSOMNIA: ICD-10-CM

## 2021-05-17 RX ORDER — CLONAZEPAM 1 MG/1
TABLET ORAL
Qty: 90 TABLET | Refills: 0 | Status: SHIPPED | OUTPATIENT
Start: 2021-05-17 | End: 2021-06-17 | Stop reason: SDUPTHER

## 2021-05-17 NOTE — TELEPHONE ENCOUNTER
clonazePAM (KLONOPIN) 1 MG tablet [[id number]        [facility]  [address]  [phone]      Patient is out of her medication

## 2021-05-17 NOTE — TELEPHONE ENCOUNTER
Medication:   Requested Prescriptions     Pending Prescriptions Disp Refills    clonazePAM (KLONOPIN) 1 MG tablet 90 tablet 2     Sig: TAKE 1 TABLET NIGHTLY AS   NEEDED FOR SLEEP      Last Filled:      Patient Phone Number: [phone] (home)     Last appt: 3/18/2021   Next appt: 6/17/2021    Last OARRS:   RX Monitoring 12/13/2019   Attestation -   Periodic Controlled Substance Monitoring No signs of potential drug abuse or diversion identified. PDMP Monitoring:    Last PDMP Arik Méndez as Reviewed [facility]):  Review User Review Instant Review Result          Preferred Pharmacy:    [address], [address] [place] [id number] [facility] [phone]  [place] [address]  Phone: [phone] Fax: [phone]    [address] [address] [facility] - P [phone] - F [phone]  [facility] [address] [facility] [address]  Phone: [phone] Fax: [phone]

## 2021-06-10 NOTE — PROGRESS NOTES
SUBJECTIVE:    Jessa López is a 66 y.o. female who presents for a follow up visit. Chief Complaint   Patient presents with    Follow-up     Patient is here for a follow up, discuss lab. Hyperlipidemia  This is a chronic problem. The current episode started more than 1 year ago. The problem is uncontrolled. Lipid results: Total cholesterol 262, triglycerides 190, HDL 65, . Factors aggravating her hyperlipidemia include beta blockers and thiazides. Pertinent negatives include no chest pain or shortness of breath. She is currently on no antihyperlipidemic treatment. Compliance problems include adherence to diet and adherence to exercise. Risk factors for coronary artery disease include dyslipidemia, hypertension, obesity, a sedentary lifestyle and post-menopausal.   Hypertension  This is a chronic problem. The current episode started more than 1 year ago. The problem is controlled. Associated symptoms include headaches ( occ'l). Pertinent negatives include no chest pain, palpitations or shortness of breath. Agents associated with hypertension include NSAIDs. Risk factors for coronary artery disease include sedentary lifestyle, obesity, post-menopausal state and dyslipidemia. Past treatments include ACE inhibitors, beta blockers and diuretics. The current treatment provides significant improvement. Compliance problems include diet. Patient's medications, allergies, past medical,surgical, social and family histories were reviewed and updated as appropriate.      Past Medical History:   Diagnosis Date    Cancer [facility])     breast, left    Congenital hernia of the diaphragm     Hyperlipidemia     Hypertension      Past Surgical History:   Procedure Laterality Date    APPENDECTOMY      BREAST SURGERY      left mastectomy    EYE SURGERY      HERNIA REPAIR      lapascopic hiatal hernia repair    TOTAL KNEE ARTHROPLASTY  4/2016    cangemi    TUBAL LIGATION       Family History   Problem resp      06/10/21 4866   Respiratory Protocol   Airway Clearance Plan Discontinue Protocol   Respiratory Assessment   Assessment Type During-treatment   General Appearance Awake; Alert   Respiratory Pattern Normal   Chest Assessment Chest expansion symmetrical   R Breath Sounds Clear   Resp Comments PT is self care with IS and meeting IS goals   Will d/c per acp leg swelling. Gastrointestinal: Negative for constipation and diarrhea. Genitourinary: Negative for difficulty urinating. Musculoskeletal: Positive for arthralgias and back pain. Neurological: Positive for headaches ( occ'l). Negative for dizziness and light-headedness. Psychiatric/Behavioral: Positive for sleep disturbance ( MNA). The patient is not nervous/anxious. OBJECTIVE:    /70   Temp 96.6 °F (35.9 °C)   Wt 194 lb (88 kg)   BMI 33.30 kg/m²    Physical Exam  Constitutional:       Appearance: She is well-developed. HENT:      Head: Normocephalic and atraumatic. Right Ear: External ear normal.      Left Ear: External ear normal.      Nose: Nose normal.      Mouth/Throat:      Mouth: Mucous membranes are moist.      Pharynx: No posterior oropharyngeal erythema. Eyes:      General:         Right eye: No discharge. Conjunctiva/sclera: Conjunctivae normal.   Neck:      Musculoskeletal: Normal range of motion and neck supple. Thyroid: No thyromegaly. Vascular: No JVD. Trachea: No tracheal deviation. Cardiovascular:      Rate and Rhythm: Normal rate and regular rhythm. Heart sounds: Normal heart sounds. Pulmonary:      Effort: Pulmonary effort is normal. No respiratory distress. Breath sounds: Normal breath sounds. No rales. Musculoskeletal:      Right lower leg: No edema. Left lower leg: No edema. Lymphadenopathy:      Cervical: No cervical adenopathy. Skin:     General: Skin is warm and dry. Neurological:      Mental Status: She is alert and oriented to person, place, and time. Psychiatric:         Mood and Affect: Mood normal.         Behavior: Behavior normal.         ASSESSMENT/PLAN:    Igor Rausch was seen today for follow-up. Diagnoses and all orders for this visit:    Depression, unspecified depression type  -     mirtazapine (REMERON) 15 MG tablet;  Take 1 tablet by mouth nightly    Need for prophylactic vaccination and inoculation against influenza  -     INFLUENZA, QUADV, ADJUVANTED, 65 YRS =, IM, PF, PREFILL SYR, 0.5ML (FLUAD)        Return in about 3 months (around 3/17/2021). Please note portions of this note were completed with a voicerecognition program.  Efforts were made to edit the dictations but occasionally words are mis-transcribed.

## 2021-06-17 ENCOUNTER — OFFICE VISIT (OUTPATIENT)
Dept: FAMILY MEDICINE CLINIC | Age: 76
End: 2021-06-17
Payer: MEDICARE

## 2021-06-17 VITALS
DIASTOLIC BLOOD PRESSURE: 80 MMHG | BODY MASS INDEX: 33.3 KG/M2 | TEMPERATURE: 97.1 F | SYSTOLIC BLOOD PRESSURE: 126 MMHG | WEIGHT: 194 LBS

## 2021-06-17 DIAGNOSIS — F51.01 PRIMARY INSOMNIA: ICD-10-CM

## 2021-06-17 DIAGNOSIS — E78.2 MIXED HYPERLIPIDEMIA: ICD-10-CM

## 2021-06-17 DIAGNOSIS — Z85.3 PERSONAL HISTORY OF MALIGNANT NEOPLASM OF BREAST: ICD-10-CM

## 2021-06-17 DIAGNOSIS — F41.9 ANXIETY: ICD-10-CM

## 2021-06-17 DIAGNOSIS — M54.50 ACUTE RIGHT-SIDED LOW BACK PAIN WITHOUT SCIATICA: Primary | ICD-10-CM

## 2021-06-17 DIAGNOSIS — I10 ESSENTIAL HYPERTENSION: ICD-10-CM

## 2021-06-17 DIAGNOSIS — M85.80 OSTEOPENIA, UNSPECIFIED LOCATION: ICD-10-CM

## 2021-06-17 PROCEDURE — 4040F PNEUMOC VAC/ADMIN/RCVD: CPT | Performed by: FAMILY MEDICINE

## 2021-06-17 PROCEDURE — G8417 CALC BMI ABV UP PARAM F/U: HCPCS | Performed by: FAMILY MEDICINE

## 2021-06-17 PROCEDURE — 99214 OFFICE O/P EST MOD 30 MIN: CPT | Performed by: FAMILY MEDICINE

## 2021-06-17 PROCEDURE — G8399 PT W/DXA RESULTS DOCUMENT: HCPCS | Performed by: FAMILY MEDICINE

## 2021-06-17 PROCEDURE — 1036F TOBACCO NON-USER: CPT | Performed by: FAMILY MEDICINE

## 2021-06-17 PROCEDURE — G8427 DOCREV CUR MEDS BY ELIG CLIN: HCPCS | Performed by: FAMILY MEDICINE

## 2021-06-17 PROCEDURE — 1123F ACP DISCUSS/DSCN MKR DOCD: CPT | Performed by: FAMILY MEDICINE

## 2021-06-17 PROCEDURE — 1090F PRES/ABSN URINE INCON ASSESS: CPT | Performed by: FAMILY MEDICINE

## 2021-06-17 RX ORDER — PRAVASTATIN SODIUM 20 MG
20 TABLET ORAL DAILY
Qty: 90 TABLET | Refills: 1 | Status: SHIPPED | OUTPATIENT
Start: 2021-06-17 | End: 2021-09-07 | Stop reason: SDUPTHER

## 2021-06-17 RX ORDER — LISINOPRIL 40 MG/1
TABLET ORAL
Qty: 90 TABLET | Refills: 3 | Status: SHIPPED | OUTPATIENT
Start: 2021-06-17 | End: 2022-07-12 | Stop reason: SDUPTHER

## 2021-06-17 RX ORDER — RALOXIFENE HYDROCHLORIDE 60 MG/1
60 TABLET, FILM COATED ORAL DAILY
Qty: 90 TABLET | Refills: 3 | Status: SHIPPED | OUTPATIENT
Start: 2021-06-17 | End: 2021-08-05

## 2021-06-17 RX ORDER — CLONAZEPAM 1 MG/1
TABLET ORAL
Qty: 90 TABLET | Refills: 1 | Status: SHIPPED | OUTPATIENT
Start: 2021-06-17 | End: 2021-08-05 | Stop reason: SDUPTHER

## 2021-06-17 ASSESSMENT — ENCOUNTER SYMPTOMS
WHEEZING: 0
BACK PAIN: 1
RHINORRHEA: 0
DIARRHEA: 0
CONSTIPATION: 0
CHEST TIGHTNESS: 0
SHORTNESS OF BREATH: 0

## 2021-06-17 NOTE — PROGRESS NOTES
SUBJECTIVE:    Lazara Browne is a 64 y.o. female who presents for a follow up visit. Chief Complaint   Patient presents with    Follow-up     Patient is here for a follow up. Discuss lab. No new concerns. Hyperlipidemia  This is a chronic problem. The current episode started more than 1 year ago. The problem is controlled. Lipid results: Total cholesterol 269, triglycerides 225, HDL 65, . Exacerbating diseases include obesity. Factors aggravating her hyperlipidemia include beta blockers and thiazides. Pertinent negatives include no chest pain or shortness of breath. She is currently on no antihyperlipidemic treatment. Compliance problems include adherence to exercise, adherence to diet and medication side effects. Risk factors for coronary artery disease include post-menopausal, a sedentary lifestyle, obesity, hypertension and dyslipidemia. Hypertension  This is a chronic problem. The current episode started more than 1 year ago. The problem is controlled. Associated symptoms include anxiety. Pertinent negatives include no chest pain, palpitations or shortness of breath. Agents associated with hypertension include NSAIDs. Risk factors for coronary artery disease include dyslipidemia, obesity, post-menopausal state and sedentary lifestyle. Past treatments include ACE inhibitors, beta blockers and diuretics. The current treatment provides significant improvement. Compliance problems include exercise and diet. Back Pain  This is a chronic problem. The current episode started more than 1 year ago. The problem has been waxing and waning since onset. The pain is present in the lumbar spine. The quality of the pain is described as aching. The symptoms are aggravated by bending, twisting and standing. Pertinent negatives include no chest pain. Risk factors include history of cancer, history of osteoporosis, menopause, obesity and lack of exercise.  She has tried NSAIDs and home exercises for the symptoms. The treatment provided mild relief. Patient has had history of back surgery. She is also had multiple epidural steroid injections. She is not interested in pursuing any other treatment at this time. Patient's medications, allergies, past medical,surgical, social and family histories were reviewed and updated as appropriate. Past Medical History:   Diagnosis Date    Cancer [facility])     breast, left    Congenital hernia of the diaphragm     History of therapeutic radiation     Hyperlipidemia     Hypertension      Past Surgical History:   Procedure Laterality Date    APPENDECTOMY      BACK SURGERY  11/09/2020    several back injections    BREAST LUMPECTOMY      BREAST SURGERY      left mastectomy    EYE SURGERY      HERNIA REPAIR      lapascopic hiatal hernia repair    MASTECTOMY      TOTAL KNEE ARTHROPLASTY  04/2016    cangemi-  multiple cortison injections - left knee    TUBAL LIGATION       Family History   Problem Relation Age of Onset    High Blood Pressure Mother     Heart Disease Mother     High Blood Pressure Sister     Diabetes Sister     High Blood Pressure Brother     Heart Disease Brother     Cancer Maternal Uncle     Breast Cancer Paternal Aunt     Uterine Cancer Maternal Grandmother     Ovarian Cancer Maternal Grandmother      Social History     Tobacco Use    Smoking status: Never Smoker    Smokeless tobacco: Never Used   Substance Use Topics    Alcohol use: No     Alcohol/week: 0.0 standard drinks      Allergies   Allergen Reactions    Cephalosporins Hives    Mirtazapine      Increased \"knee pain\"    Nickel     Tetanus Toxoids      Current Outpatient Medications on File Prior to Visit   Medication Sig Dispense Refill    Breast Prosthesis MISC by Does not apply route left breast prosthetic and bra. Please give max for insurance coverage.  1 each 5    atenolol (TENORMIN) 100 MG tablet TAKE 1 TABLET DAILY 90 tablet 3    hydroCHLOROthiazide (HYDRODIURIL) Breath sounds: Normal breath sounds. No rales. Musculoskeletal:      Cervical back: Normal range of motion and neck supple. Right lower leg: No edema. Left lower leg: No edema. Lymphadenopathy:      Cervical: No cervical adenopathy. Skin:     General: Skin is warm and dry. Neurological:      Mental Status: She is alert and oriented to person, place, and time. Gait: Gait normal.   Psychiatric:         Mood and Affect: Mood normal.         Behavior: Behavior normal.         ASSESSMENT/PLAN:    Melida Betancur was seen today for follow-up. Diagnoses and all orders for this visit:    Chronic right-sided low back pain without sciatica  Patient does not want to pursue any further treatment at this time. Essential hypertension  -     lisinopril (PRINIVIL;ZESTRIL) 40 MG tablet; TAKE 1 TABLET DAILY    Mixed hyperlipidemia  Patient's LDL has trended upward. She is agreeable to starting a statin. -     pravastatin (PRAVACHOL) 20 MG tablet; Take 1 tablet by mouth daily  -     Comprehensive Metabolic Panel, Fasting; Future  -     CBC Auto Differential; Future  -     Lipid, Fasting; Future  -     TSH with Reflex; Future    Personal history of malignant neoplasm of breast  Doing well    Primary insomnia  Patient noted that without the clonazepam she is unable to sleep. -     clonazePAM (KLONOPIN) 1 MG tablet; TAKE 1 TABLET NIGHTLY AS   NEEDED FOR SLEEP    Osteopenia, unspecified location  -     raloxifene (EVISTA) 60 MG tablet; Take 1 tablet by mouth daily        Return in about 3 months (around 9/17/2021). Please note portions of this note were completed with a voicerecognition program.  Efforts were made to edit the dictations but occasionally words are mis-transcribed.

## 2021-07-08 ENCOUNTER — HOSPITAL ENCOUNTER (OUTPATIENT)
Dept: CT IMAGING | Age: 76
Discharge: HOME | End: 2021-07-08
Payer: MEDICARE

## 2021-07-08 ENCOUNTER — OFFICE VISIT (OUTPATIENT)
Dept: FAMILY MEDICINE CLINIC | Age: 76
End: 2021-07-08
Payer: MEDICARE

## 2021-07-08 VITALS
DIASTOLIC BLOOD PRESSURE: 80 MMHG | BODY MASS INDEX: 33.13 KG/M2 | TEMPERATURE: 97.3 F | WEIGHT: 193 LBS | SYSTOLIC BLOOD PRESSURE: 118 MMHG

## 2021-07-08 DIAGNOSIS — Z85.3 HISTORY OF BREAST CANCER: ICD-10-CM

## 2021-07-08 DIAGNOSIS — I82.4Y2 DEEP VEIN THROMBOSIS (DVT) OF PROXIMAL VEIN OF LEFT LOWER EXTREMITY, UNSPECIFIED CHRONICITY (HCC): ICD-10-CM

## 2021-07-08 DIAGNOSIS — R07.9 CHEST PAIN, UNSPECIFIED TYPE: ICD-10-CM

## 2021-07-08 PROCEDURE — 6360000004 HC RX CONTRAST MEDICATION: Performed by: FAMILY MEDICINE

## 2021-07-08 PROCEDURE — 4040F PNEUMOC VAC/ADMIN/RCVD: CPT | Performed by: FAMILY MEDICINE

## 2021-07-08 PROCEDURE — G8427 DOCREV CUR MEDS BY ELIG CLIN: HCPCS | Performed by: FAMILY MEDICINE

## 2021-07-08 PROCEDURE — 1123F ACP DISCUSS/DSCN MKR DOCD: CPT | Performed by: FAMILY MEDICINE

## 2021-07-08 PROCEDURE — 1090F PRES/ABSN URINE INCON ASSESS: CPT | Performed by: FAMILY MEDICINE

## 2021-07-08 PROCEDURE — G8399 PT W/DXA RESULTS DOCUMENT: HCPCS | Performed by: FAMILY MEDICINE

## 2021-07-08 PROCEDURE — 99214 OFFICE O/P EST MOD 30 MIN: CPT | Performed by: FAMILY MEDICINE

## 2021-07-08 PROCEDURE — 1036F TOBACCO NON-USER: CPT | Performed by: FAMILY MEDICINE

## 2021-07-08 PROCEDURE — 71260 CT THORAX DX C+: CPT

## 2021-07-08 PROCEDURE — G8417 CALC BMI ABV UP PARAM F/U: HCPCS | Performed by: FAMILY MEDICINE

## 2021-07-08 RX ADMIN — IOPAMIDOL 75 ML: 755 INJECTION, SOLUTION INTRAVENOUS at 15:59

## 2021-07-08 ASSESSMENT — ENCOUNTER SYMPTOMS: SHORTNESS OF BREATH: 1

## 2021-07-08 NOTE — PROGRESS NOTES
SUBJECTIVE:    Cristina Rushing is a 64 y.o. female who presents for a follow up visit. Chief Complaint   Patient presents with    ED Follow-up     [address] E/R f/u- blood clot left leg. Chest Pain   This is a new problem. The current episode started 1 to 4 weeks ago. The problem occurs intermittently. The pain is present in the lateral region. The pain is mild. Quality: sore to palpate. Associated symptoms include shortness of breath ( with exertion). She has tried NSAIDs for the symptoms. The treatment provided mild relief. Risk factors: Evista, Recent DVT LLE. Her past medical history is significant for DVT. Patient's medications, allergies, past medical,surgical, social and family histories were reviewed and updated as appropriate.      Past Medical History:   Diagnosis Date    Cancer [facility])     breast, left    Congenital hernia of the diaphragm     History of therapeutic radiation     Hyperlipidemia     Hypertension      Past Surgical History:   Procedure Laterality Date    APPENDECTOMY      BACK SURGERY  11/09/2020    several back injections    BREAST LUMPECTOMY      BREAST SURGERY      left mastectomy    EYE SURGERY      HERNIA REPAIR      lapascopic hiatal hernia repair    MASTECTOMY      TOTAL KNEE ARTHROPLASTY  04/2016    cangemi-  multiple cortison injections - left knee    TUBAL LIGATION       Family History   Problem Relation Age of Onset    High Blood Pressure Mother     Heart Disease Mother     High Blood Pressure Sister     Diabetes Sister     High Blood Pressure Brother     Heart Disease Brother     Cancer Maternal Uncle     Breast Cancer Paternal Aunt     Uterine Cancer Maternal Grandmother     Ovarian Cancer Maternal Grandmother      Social History     Tobacco Use    Smoking status: Never Smoker    Smokeless tobacco: Never Used   Substance Use Topics    Alcohol use: No     Alcohol/week: 0.0 standard drinks      Allergies   Allergen Reactions    Cephalosporins Hives    Mirtazapine      Increased \"knee pain\"    Nickel     Tetanus Toxoids     Tramadol Nausea Only     Current Outpatient Medications on File Prior to Visit   Medication Sig Dispense Refill    rivaroxaban (XARELTO) 15 MG TABS tablet Take 15 mg by mouth 2 times daily      clonazePAM (KLONOPIN) 1 MG tablet TAKE 1 TABLET NIGHTLY AS   NEEDED FOR SLEEP 90 tablet 1    lisinopril (PRINIVIL;ZESTRIL) 40 MG tablet TAKE 1 TABLET DAILY 90 tablet 3    raloxifene (EVISTA) 60 MG tablet Take 1 tablet by mouth daily 90 tablet 3    pravastatin (PRAVACHOL) 20 MG tablet Take 1 tablet by mouth daily 90 tablet 1    Breast Prosthesis MISC by Does not apply route left breast prosthetic and bra. Please give max for insurance coverage. 1 each 5    atenolol (TENORMIN) 100 MG tablet TAKE 1 TABLET DAILY 90 tablet 3    hydroCHLOROthiazide (HYDRODIURIL) 25 MG tablet TAKE 1 TABLET DAILY 90 tablet 3    meloxicam (MOBIC) 7.5 MG tablet TAKE 1 TABLET DAILY 90 tablet 3    aspirin 81 MG tablet Take 81 mg by mouth daily      acetaminophen (TYLENOL ARTHRITIS PAIN) 650 MG CR tablet Take 650 mg by mouth 2 times daily       calcium carbonate-vitamin D (CALCIUM 600 + D) 600-400 MG-UNIT TABS per tab Take 1 tablet by mouth every other day        No current facility-administered medications on file prior to visit. Review of Systems   Constitutional: Positive for fatigue. HENT: Negative for nosebleeds. Respiratory: Positive for shortness of breath ( with exertion). Cardiovascular: Positive for chest pain. Genitourinary: Negative for hematuria. OBJECTIVE:    /80   Temp 97.3 °F (36.3 °C)   Wt 193 lb (87.5 kg)   BMI 33.13 kg/m²    Physical Exam    ASSESSMENT/PLAN:    Davion Strickland was seen today for ed follow-up. Diagnoses and all orders for this visit:    Chest pain, unspecified type  -     CT CHEST W WO CONTRAST;  Future    Deep vein thrombosis (DVT) of proximal vein of left lower extremity, unspecified chronicity ([facility] [address]  -     CT CHEST W WO CONTRAST; Future    History of breast cancer  -     Jaylen Lei MD, Breast Surgery, [facility]        Return for regularly scheduled follow-up. Please note portions of this note were completed with a voicerecognition program.  Efforts were made to edit the dictations but occasionally words are mis-transcribed.

## 2021-07-09 ENCOUNTER — TELEPHONE (OUTPATIENT)
Dept: FAMILY MEDICINE CLINIC | Age: 76
End: 2021-07-09

## 2021-07-09 NOTE — TELEPHONE ENCOUNTER
CT was negative. Dr. Courtney Do recommends stopping the Evista. Stop Mobic because of increased risk of bleeding. Will need to stay on the Xarelto for a full 6 months.

## 2021-07-09 NOTE — TELEPHONE ENCOUNTER
PT wanted to talk to Dr Fermín Man about getting her CT Pulmonary test results Please give him at call back.

## 2021-07-13 DIAGNOSIS — I82.4Y2 ACUTE DEEP VEIN THROMBOSIS (DVT) OF PROXIMAL VEIN OF LEFT LOWER EXTREMITY (HCC): Primary | ICD-10-CM

## 2021-07-13 NOTE — TELEPHONE ENCOUNTER
Pt advise of RB notes from 7/9/21 as pt wasn't advise of note.  Please send rx for xarelto to pt's local pharmacy

## 2021-07-19 ENCOUNTER — TELEPHONE (OUTPATIENT)
Dept: FAMILY MEDICINE CLINIC | Age: 76
End: 2021-07-19

## 2021-07-19 NOTE — TELEPHONE ENCOUNTER
Marina Olivares from [address]    Called stating that they received a duplicate prescription for Xarelto one for 15 mg and 20 mg    pharmacy need to verify which is the correct prescription     Ref # [id number]    Please advice     Provider out of office

## 2021-07-20 ENCOUNTER — OFFICE VISIT (OUTPATIENT)
Dept: SURGERY | Age: 76
End: 2021-07-20
Payer: MEDICARE

## 2021-07-20 VITALS
OXYGEN SATURATION: 95 % | HEART RATE: 63 BPM | BODY MASS INDEX: 33.47 KG/M2 | SYSTOLIC BLOOD PRESSURE: 118 MMHG | DIASTOLIC BLOOD PRESSURE: 63 MMHG | WEIGHT: 195 LBS

## 2021-07-20 DIAGNOSIS — N64.4 BREAST PAIN: Primary | ICD-10-CM

## 2021-07-20 DIAGNOSIS — Z85.3 HISTORY OF BREAST CANCER: ICD-10-CM

## 2021-07-20 DIAGNOSIS — Z80.3 FAMILY HISTORY OF BREAST CANCER: ICD-10-CM

## 2021-07-20 DIAGNOSIS — Z85.3 ENCOUNTER FOR FOLLOW-UP SURVEILLANCE OF BREAST CANCER: ICD-10-CM

## 2021-07-20 DIAGNOSIS — Z08 ENCOUNTER FOR FOLLOW-UP SURVEILLANCE OF BREAST CANCER: ICD-10-CM

## 2021-07-20 DIAGNOSIS — Z90.12 HISTORY OF LEFT MASTECTOMY: ICD-10-CM

## 2021-07-20 PROCEDURE — 99213 OFFICE O/P EST LOW 20 MIN: CPT | Performed by: NURSE PRACTITIONER

## 2021-07-20 PROCEDURE — 1090F PRES/ABSN URINE INCON ASSESS: CPT | Performed by: NURSE PRACTITIONER

## 2021-07-20 PROCEDURE — G8399 PT W/DXA RESULTS DOCUMENT: HCPCS | Performed by: NURSE PRACTITIONER

## 2021-07-20 PROCEDURE — 1123F ACP DISCUSS/DSCN MKR DOCD: CPT | Performed by: NURSE PRACTITIONER

## 2021-07-20 PROCEDURE — 4040F PNEUMOC VAC/ADMIN/RCVD: CPT | Performed by: NURSE PRACTITIONER

## 2021-07-20 PROCEDURE — G8417 CALC BMI ABV UP PARAM F/U: HCPCS | Performed by: NURSE PRACTITIONER

## 2021-07-20 PROCEDURE — G8427 DOCREV CUR MEDS BY ELIG CLIN: HCPCS | Performed by: NURSE PRACTITIONER

## 2021-07-20 PROCEDURE — 1036F TOBACCO NON-USER: CPT | Performed by: NURSE PRACTITIONER

## 2021-08-02 ENCOUNTER — HOSPITAL ENCOUNTER (OUTPATIENT)
Dept: ULTRASOUND IMAGING | Age: 76
Discharge: HOME | End: 2021-08-02
Payer: MEDICARE

## 2021-08-02 DIAGNOSIS — N64.4 BREAST PAIN: ICD-10-CM

## 2021-08-02 PROCEDURE — 76642 ULTRASOUND BREAST LIMITED: CPT

## 2021-08-05 ENCOUNTER — OFFICE VISIT (OUTPATIENT)
Dept: FAMILY MEDICINE CLINIC | Age: 76
End: 2021-08-05
Payer: MEDICARE

## 2021-08-05 VITALS
WEIGHT: 196 LBS | TEMPERATURE: 96.9 F | BODY MASS INDEX: 33.64 KG/M2 | DIASTOLIC BLOOD PRESSURE: 70 MMHG | SYSTOLIC BLOOD PRESSURE: 118 MMHG

## 2021-08-05 DIAGNOSIS — I82.412 DEEP VEIN THROMBOSIS (DVT) OF FEMORAL VEIN OF LEFT LOWER EXTREMITY, UNSPECIFIED CHRONICITY (HCC): Primary | ICD-10-CM

## 2021-08-05 DIAGNOSIS — F13.20 SEDATIVE, HYPNOTIC OR ANXIOLYTIC DEPENDENCE, UNCOMPLICATED (HCC): ICD-10-CM

## 2021-08-05 DIAGNOSIS — M25.552 BILATERAL HIP PAIN: ICD-10-CM

## 2021-08-05 DIAGNOSIS — F51.01 PRIMARY INSOMNIA: ICD-10-CM

## 2021-08-05 DIAGNOSIS — M25.551 BILATERAL HIP PAIN: ICD-10-CM

## 2021-08-05 PROCEDURE — G8427 DOCREV CUR MEDS BY ELIG CLIN: HCPCS | Performed by: FAMILY MEDICINE

## 2021-08-05 PROCEDURE — G8417 CALC BMI ABV UP PARAM F/U: HCPCS | Performed by: FAMILY MEDICINE

## 2021-08-05 PROCEDURE — 1123F ACP DISCUSS/DSCN MKR DOCD: CPT | Performed by: FAMILY MEDICINE

## 2021-08-05 PROCEDURE — G8399 PT W/DXA RESULTS DOCUMENT: HCPCS | Performed by: FAMILY MEDICINE

## 2021-08-05 PROCEDURE — 1036F TOBACCO NON-USER: CPT | Performed by: FAMILY MEDICINE

## 2021-08-05 PROCEDURE — 99213 OFFICE O/P EST LOW 20 MIN: CPT | Performed by: FAMILY MEDICINE

## 2021-08-05 PROCEDURE — 1090F PRES/ABSN URINE INCON ASSESS: CPT | Performed by: FAMILY MEDICINE

## 2021-08-05 PROCEDURE — 4040F PNEUMOC VAC/ADMIN/RCVD: CPT | Performed by: FAMILY MEDICINE

## 2021-08-05 RX ORDER — CLONAZEPAM 1 MG/1
TABLET ORAL
Qty: 90 TABLET | Refills: 1 | Status: SHIPPED | OUTPATIENT
Start: 2021-08-05 | End: 2022-02-09 | Stop reason: SDUPTHER

## 2021-08-05 ASSESSMENT — ENCOUNTER SYMPTOMS
DIARRHEA: 0
CONSTIPATION: 0
SHORTNESS OF BREATH: 0
BACK PAIN: 1

## 2021-08-05 NOTE — PROGRESS NOTES
SUBJECTIVE:    Martha Waggoner is a 64 y.o. female who presents for a follow up visit. Chief Complaint   Patient presents with    Follow-up     Has recently been taken off antinflammatory due to being started on xarelto. Hip Pain   There was no injury mechanism. The pain is present in the left hip and right hip. The quality of the pain is described as aching. The pain is moderate. The symptoms are aggravated by movement and weight bearing. She has tried acetaminophen, NSAIDs and rest for the symptoms. The treatment provided mild relief. DVT Left femoral vein  DVT was diagnosed June 19 of this year. Patient has been on Xarelto 20 mg daily now. She denies any bleeding problems. Her meloxicam was stopped but she is complaining of diffuse arthralgias. We discussed the increased risk of bleeding with the use of the meloxicam and Xarelto. She is willing to try this and stop if there is any bleeding problems. I am also unsure of how long to keep her on the Xarelto. Plan at this time is to keep her on it for 6 months total.        Patient's medications, allergies, past medical,surgical, social and family histories were reviewed and updated as appropriate.      Past Medical History:   Diagnosis Date    Cancer [facility])     breast, left    Congenital hernia of the diaphragm     History of therapeutic radiation     Hyperlipidemia     Hypertension      Past Surgical History:   Procedure Laterality Date    APPENDECTOMY      BACK SURGERY  11/09/2020    several back injections    BREAST LUMPECTOMY      BREAST SURGERY      left mastectomy    EYE SURGERY      HERNIA REPAIR      lapascopic hiatal hernia repair    MASTECTOMY      TOTAL KNEE ARTHROPLASTY  04/2016    cangemi-  multiple cortison injections - left knee    TUBAL LIGATION       Family History   Problem Relation Age of Onset    High Blood Pressure Mother     Heart Disease Mother     High Blood Pressure Sister     Diabetes Sister     High Blood Pressure Brother     Heart Disease Brother     Cancer Maternal Uncle     Breast Cancer Paternal Aunt     Uterine Cancer Maternal Grandmother     Ovarian Cancer Maternal Grandmother      Social History     Tobacco Use    Smoking status: Never Smoker    Smokeless tobacco: Never Used   Substance Use Topics    Alcohol use: No     Alcohol/week: 0.0 standard drinks      Allergies   Allergen Reactions    Cephalosporins Hives    Mirtazapine      Increased \"knee pain\"    Nickel     Tetanus Toxoids     Tramadol Nausea Only     Current Outpatient Medications on File Prior to Visit   Medication Sig Dispense Refill    rivaroxaban (XARELTO) 15 MG TABS tablet Take 1 tablet by mouth 2 times daily (with meals) for 21 days Once this is finished start new Rx for Xarelto 20 mg daily 42 tablet 0    lisinopril (PRINIVIL;ZESTRIL) 40 MG tablet TAKE 1 TABLET DAILY 90 tablet 3    pravastatin (PRAVACHOL) 20 MG tablet Take 1 tablet by mouth daily 90 tablet 1    Breast Prosthesis MISC by Does not apply route left breast prosthetic and bra. Please give max for insurance coverage. 1 each 5    atenolol (TENORMIN) 100 MG tablet TAKE 1 TABLET DAILY 90 tablet 3    hydroCHLOROthiazide (HYDRODIURIL) 25 MG tablet TAKE 1 TABLET DAILY 90 tablet 3    acetaminophen (TYLENOL ARTHRITIS PAIN) 650 MG CR tablet Take 650 mg by mouth 2 times daily       calcium carbonate-vitamin D (CALCIUM 600 + D) 600-400 MG-UNIT TABS per tab Take 1 tablet by mouth every other day       rivaroxaban (XARELTO) 20 MG TABS tablet Take 1 tablet by mouth daily (with breakfast) (Patient not taking: Reported on 7/20/2021) 90 tablet 1     No current facility-administered medications on file prior to visit. Review of Systems   Constitutional: Positive for fatigue. Negative for activity change. Respiratory: Negative for shortness of breath. Cardiovascular: Negative for chest pain. Gastrointestinal: Negative for constipation and diarrhea. Genitourinary: Negative for difficulty urinating. Musculoskeletal: Positive for arthralgias ( shoulders, hips, arms) and back pain. Psychiatric/Behavioral: Negative for dysphoric mood and sleep disturbance. The patient is not nervous/anxious. OBJECTIVE:    /70   Temp 96.9 °F (36.1 °C)   Wt 196 lb (88.9 kg)   BMI 33.64 kg/m²    Physical Exam  Constitutional:       Appearance: She is well-developed. She is obese. HENT:      Head: Normocephalic and atraumatic. Right Ear: Tympanic membrane and external ear normal.      Left Ear: Tympanic membrane and external ear normal.      Nose: Nose normal.      Mouth/Throat:      Mouth: Mucous membranes are moist.      Pharynx: No posterior oropharyngeal erythema. Eyes:      General:         Right eye: No discharge. Conjunctiva/sclera: Conjunctivae normal.   Neck:      Thyroid: No thyromegaly. Vascular: No JVD. Trachea: No tracheal deviation. Cardiovascular:      Rate and Rhythm: Normal rate and regular rhythm. Heart sounds: Normal heart sounds. Pulmonary:      Effort: Pulmonary effort is normal. No respiratory distress. Breath sounds: Normal breath sounds. No rales. Musculoskeletal:      Cervical back: Normal range of motion and neck supple. Left lower leg: Edema ( mild) present. Lymphadenopathy:      Cervical: No cervical adenopathy. Skin:     General: Skin is warm and dry. Neurological:      Mental Status: She is alert and oriented to person, place, and time. Psychiatric:         Mood and Affect: Mood normal.         Behavior: Behavior normal.         ASSESSMENT/PLAN:    Danielle Moeller was seen today for follow-up. Diagnoses and all orders for this visit:    Deep vein thrombosis (DVT) of femoral vein of left lower extremity, unspecified chronicity ([address]  Would like  opinion on how long to stay on the Xarelto.   -     Jose Mendoza MD, Vascular Surgery, [facility]    Primary insomnia  - clonazePAM (KLONOPIN) 1 MG tablet; TAKE 1 TABLET NIGHTLY AS   NEEDED FOR SLEEP    Sedative, hypnotic or anxiolytic dependence, uncomplicated    Bilateral hip pain  Will allow her to try meloxicam once again. She is hoping to avoid any narcotic pain medication. She is aware of the potential for increased chance of bleeding. She knows to immediately stop if she notices any hematuria or nosebleeds or change in stools. Return for regularly scheduled follow-up. Please note portions of this note were completed with a voicerecognition program.  Efforts were made to edit the dictations but occasionally words are mis-transcribed.

## 2021-09-07 DIAGNOSIS — E78.2 MIXED HYPERLIPIDEMIA: ICD-10-CM

## 2021-09-07 RX ORDER — PRAVASTATIN SODIUM 20 MG
20 TABLET ORAL DAILY
Qty: 90 TABLET | Refills: 3 | Status: SHIPPED | OUTPATIENT
Start: 2021-09-07 | End: 2022-06-21 | Stop reason: SDUPTHER

## 2021-09-28 ENCOUNTER — OFFICE VISIT (OUTPATIENT)
Dept: VASCULAR SURGERY | Age: 76
End: 2021-09-28
Payer: MEDICARE

## 2021-09-28 VITALS
SYSTOLIC BLOOD PRESSURE: 126 MMHG | BODY MASS INDEX: 33.29 KG/M2 | HEIGHT: 64 IN | DIASTOLIC BLOOD PRESSURE: 78 MMHG | WEIGHT: 195 LBS

## 2021-09-28 DIAGNOSIS — I82.412 DVT OF DEEP FEMORAL VEIN, LEFT (HCC): Primary | ICD-10-CM

## 2021-09-28 PROCEDURE — G8399 PT W/DXA RESULTS DOCUMENT: HCPCS | Performed by: SURGERY

## 2021-09-28 PROCEDURE — 1036F TOBACCO NON-USER: CPT | Performed by: SURGERY

## 2021-09-28 PROCEDURE — G8417 CALC BMI ABV UP PARAM F/U: HCPCS | Performed by: SURGERY

## 2021-09-28 PROCEDURE — 1123F ACP DISCUSS/DSCN MKR DOCD: CPT | Performed by: SURGERY

## 2021-09-28 PROCEDURE — G8427 DOCREV CUR MEDS BY ELIG CLIN: HCPCS | Performed by: SURGERY

## 2021-09-28 PROCEDURE — 4040F PNEUMOC VAC/ADMIN/RCVD: CPT | Performed by: SURGERY

## 2021-09-28 PROCEDURE — 99203 OFFICE O/P NEW LOW 30 MIN: CPT | Performed by: SURGERY

## 2021-09-28 PROCEDURE — 1090F PRES/ABSN URINE INCON ASSESS: CPT | Performed by: SURGERY

## 2021-09-28 NOTE — PROGRESS NOTES
Never Used   Vaping Use    Vaping Use: Never used   Substance and Sexual Activity    Alcohol use: No     Alcohol/week: 0.0 standard drinks    Drug use: No    Sexual activity: Not on file   Other Topics Concern    Not on file   Social History Narrative    Not on file     Social Determinants of Health     Financial Resource Strain:     Difficulty of Paying Living Expenses:    Food Insecurity:     Worried About Running Out of Food in the Last Year:     [address] in the Last Year:    Transportation Needs:     Lack of Transportation (Medical):  Lack of Transportation (Non-Medical):    Physical Activity:     Days of Exercise per Week:     Minutes of Exercise per Session:    Stress:     Feeling of Stress :    Social Connections:     Frequency of Communication with Friends and Family:     Frequency of Social Gatherings with Friends and Family:     Attends [employer] Services:     Active Member of Clubs or Organizations:     Attends Club or Organization Meetings:     Marital Status:    Intimate Partner Violence:     Fear of Current or Ex-Partner:     Emotionally Abused:     Physically Abused:     Sexually Abused:        Family History   Problem Relation Age of Onset    High Blood Pressure Mother     Heart Disease Mother     High Blood Pressure Sister     Diabetes Sister     High Blood Pressure Brother     Heart Disease Brother     Cancer Maternal Uncle     Breast Cancer Paternal Aunt     Uterine Cancer Maternal Grandmother     Ovarian Cancer Maternal Grandmother      - No history of bleeding or clotting disorders    Vital Signs  There were no vitals filed for this visit.     Physical Examination  General:  no apparent distress  Psychiatric: affect appropriate  Extremities: warm and well perfused  - bilateral upper extremity motorsensory intact  - bilateral lower extremity motorsensory intact  Vascular exam:  Palpable pedal pulses    Labs  Lab Results   Component Value Date    WBC 5.1

## 2021-09-29 ENCOUNTER — OFFICE VISIT (OUTPATIENT)
Dept: FAMILY MEDICINE CLINIC | Age: 76
End: 2021-09-29
Payer: MEDICARE

## 2021-09-29 VITALS
SYSTOLIC BLOOD PRESSURE: 126 MMHG | TEMPERATURE: 97.2 F | DIASTOLIC BLOOD PRESSURE: 80 MMHG | BODY MASS INDEX: 33.47 KG/M2 | WEIGHT: 195 LBS

## 2021-09-29 DIAGNOSIS — Z23 NEED FOR INFLUENZA VACCINATION: Primary | ICD-10-CM

## 2021-09-29 DIAGNOSIS — I10 ESSENTIAL HYPERTENSION: ICD-10-CM

## 2021-09-29 DIAGNOSIS — B37.31 CANDIDA VAGINITIS: ICD-10-CM

## 2021-09-29 DIAGNOSIS — E78.2 MIXED HYPERLIPIDEMIA: ICD-10-CM

## 2021-09-29 PROCEDURE — 99214 OFFICE O/P EST MOD 30 MIN: CPT | Performed by: FAMILY MEDICINE

## 2021-09-29 PROCEDURE — G0008 ADMIN INFLUENZA VIRUS VAC: HCPCS | Performed by: FAMILY MEDICINE

## 2021-09-29 PROCEDURE — 1123F ACP DISCUSS/DSCN MKR DOCD: CPT | Performed by: FAMILY MEDICINE

## 2021-09-29 PROCEDURE — G8427 DOCREV CUR MEDS BY ELIG CLIN: HCPCS | Performed by: FAMILY MEDICINE

## 2021-09-29 PROCEDURE — 1036F TOBACCO NON-USER: CPT | Performed by: FAMILY MEDICINE

## 2021-09-29 PROCEDURE — 4040F PNEUMOC VAC/ADMIN/RCVD: CPT | Performed by: FAMILY MEDICINE

## 2021-09-29 PROCEDURE — G8399 PT W/DXA RESULTS DOCUMENT: HCPCS | Performed by: FAMILY MEDICINE

## 2021-09-29 PROCEDURE — 90694 VACC AIIV4 NO PRSRV 0.5ML IM: CPT | Performed by: FAMILY MEDICINE

## 2021-09-29 PROCEDURE — 1090F PRES/ABSN URINE INCON ASSESS: CPT | Performed by: FAMILY MEDICINE

## 2021-09-29 PROCEDURE — G8417 CALC BMI ABV UP PARAM F/U: HCPCS | Performed by: FAMILY MEDICINE

## 2021-09-29 RX ORDER — MELOXICAM 7.5 MG/1
7.5 TABLET ORAL DAILY
COMMUNITY
End: 2021-11-11

## 2021-09-29 RX ORDER — ATENOLOL 100 MG/1
TABLET ORAL
Qty: 90 TABLET | Refills: 3 | Status: SHIPPED | OUTPATIENT
Start: 2021-09-29 | End: 2022-10-26 | Stop reason: SDUPTHER

## 2021-09-29 RX ORDER — HYDROCHLOROTHIAZIDE 25 MG/1
TABLET ORAL
Qty: 90 TABLET | Refills: 3 | Status: SHIPPED | OUTPATIENT
Start: 2021-09-29 | End: 2022-09-06 | Stop reason: SDUPTHER

## 2021-09-29 RX ORDER — CLOTRIMAZOLE AND BETAMETHASONE DIPROPIONATE 10; .64 MG/G; MG/G
CREAM TOPICAL 2 TIMES DAILY
Qty: 15 G | Refills: 5 | Status: SHIPPED | OUTPATIENT
Start: 2021-09-29 | End: 2022-06-20

## 2021-09-29 ASSESSMENT — ENCOUNTER SYMPTOMS
SHORTNESS OF BREATH: 0
CHEST TIGHTNESS: 0
BACK PAIN: 1
CONSTIPATION: 0
RHINORRHEA: 0
DIARRHEA: 0

## 2021-09-29 NOTE — PROGRESS NOTES
Vaccine Information Sheet, \"Influenza - Inactivated\"  given to Chiquis Henson, or parent/legal guardian of  Chiquis Henson and verbalized understanding. Patient responses:    Have you ever had a reaction to a flu vaccine? No  Do you have any current illness? No  Have you ever had [place] Syndrome? No  Do you have a serious allergy to any of the follow: Neomycin, Polymyxin, Thimerosal, eggs or egg products? No    Flu vaccine given per order. Please see immunization tab. Risks and benefits explained. Current VIS given.

## 2021-10-27 DIAGNOSIS — I82.4Y2 ACUTE DEEP VEIN THROMBOSIS (DVT) OF PROXIMAL VEIN OF LEFT LOWER EXTREMITY (HCC): ICD-10-CM

## 2021-10-27 NOTE — TELEPHONE ENCOUNTER
Patient is calling to get a refill on her medication. She usually gets a 90 day supply but she only needs 40 days worth. Was wondering if  could send over new prescription. Patient only has enough to last her until Saturday. rivaroxaban (XARELTO) 20 MG TABS tablet 90 tablet 1 7/13/2021     Sig - Route:  Take 1 tablet by mouth daily (with breakfast) - Oral      [facility]

## 2021-11-11 RX ORDER — MELOXICAM 7.5 MG/1
TABLET ORAL
Qty: 90 TABLET | Refills: 3 | Status: SHIPPED | OUTPATIENT
Start: 2021-11-11 | End: 2022-05-11

## 2021-12-07 DIAGNOSIS — I82.4Y2 ACUTE DEEP VEIN THROMBOSIS (DVT) OF PROXIMAL VEIN OF LEFT LOWER EXTREMITY (HCC): ICD-10-CM

## 2021-12-08 RX ORDER — RIVAROXABAN 20 MG/1
TABLET, FILM COATED ORAL
Qty: 30 TABLET | Refills: 1 | Status: SHIPPED | OUTPATIENT
Start: 2021-12-08 | End: 2022-01-05

## 2022-01-05 ENCOUNTER — OFFICE VISIT (OUTPATIENT)
Dept: FAMILY MEDICINE CLINIC | Age: 77
End: 2022-01-05
Payer: MEDICARE

## 2022-01-05 VITALS
TEMPERATURE: 97.1 F | DIASTOLIC BLOOD PRESSURE: 86 MMHG | BODY MASS INDEX: 34.5 KG/M2 | WEIGHT: 201 LBS | SYSTOLIC BLOOD PRESSURE: 120 MMHG

## 2022-01-05 DIAGNOSIS — G89.29 CHRONIC PAIN OF BOTH KNEES: ICD-10-CM

## 2022-01-05 DIAGNOSIS — E78.2 MIXED HYPERLIPIDEMIA: ICD-10-CM

## 2022-01-05 DIAGNOSIS — Z78.0 MENOPAUSE: Primary | ICD-10-CM

## 2022-01-05 DIAGNOSIS — E55.9 VITAMIN D DEFICIENCY: ICD-10-CM

## 2022-01-05 DIAGNOSIS — Z86.718 HISTORY OF DVT OF LOWER EXTREMITY: ICD-10-CM

## 2022-01-05 DIAGNOSIS — M25.561 CHRONIC PAIN OF BOTH KNEES: ICD-10-CM

## 2022-01-05 DIAGNOSIS — M25.562 CHRONIC PAIN OF BOTH KNEES: ICD-10-CM

## 2022-01-05 DIAGNOSIS — Z85.3 PERSONAL HISTORY OF MALIGNANT NEOPLASM OF BREAST: ICD-10-CM

## 2022-01-05 PROCEDURE — 1090F PRES/ABSN URINE INCON ASSESS: CPT | Performed by: FAMILY MEDICINE

## 2022-01-05 PROCEDURE — 1123F ACP DISCUSS/DSCN MKR DOCD: CPT | Performed by: FAMILY MEDICINE

## 2022-01-05 PROCEDURE — G8484 FLU IMMUNIZE NO ADMIN: HCPCS | Performed by: FAMILY MEDICINE

## 2022-01-05 PROCEDURE — 1036F TOBACCO NON-USER: CPT | Performed by: FAMILY MEDICINE

## 2022-01-05 PROCEDURE — G8427 DOCREV CUR MEDS BY ELIG CLIN: HCPCS | Performed by: FAMILY MEDICINE

## 2022-01-05 PROCEDURE — 4040F PNEUMOC VAC/ADMIN/RCVD: CPT | Performed by: FAMILY MEDICINE

## 2022-01-05 PROCEDURE — 99214 OFFICE O/P EST MOD 30 MIN: CPT | Performed by: FAMILY MEDICINE

## 2022-01-05 PROCEDURE — G8399 PT W/DXA RESULTS DOCUMENT: HCPCS | Performed by: FAMILY MEDICINE

## 2022-01-05 PROCEDURE — G8417 CALC BMI ABV UP PARAM F/U: HCPCS | Performed by: FAMILY MEDICINE

## 2022-01-05 RX ORDER — ASPIRIN 81 MG/1
81 TABLET ORAL DAILY
COMMUNITY

## 2022-01-05 ASSESSMENT — ENCOUNTER SYMPTOMS
SHORTNESS OF BREATH: 0
RHINORRHEA: 0
CONSTIPATION: 0
ABDOMINAL PAIN: 0
CHEST TIGHTNESS: 0
DIARRHEA: 1
BACK PAIN: 1

## 2022-01-05 NOTE — PROGRESS NOTES
SUBJECTIVE:    Nicholas Betancur is a 64 y.o. female who presents for a follow up visit. Chief Complaint   Patient presents with    Follow-up     Patient is here for a follow up. No lab. Has to have right knee surgery again. Back Pain  This is a chronic problem. The current episode started more than 1 year ago. The problem has been waxing and waning since onset. The pain is present in the lumbar spine. The quality of the pain is described as aching. The pain does not radiate. The pain is mild. The symptoms are aggravated by sitting. Pertinent negatives include no abdominal pain, bladder incontinence, chest pain, numbness or paresis. Risk factors include obesity and history of cancer. She has tried NSAIDs for the symptoms. The treatment provided moderate relief. Knee Pain   There was no injury mechanism. The pain is present in the left knee and right knee. Quality: soreness. The pain has been fluctuating since onset. Pertinent negatives include no numbness. Nothing aggravates the symptoms. She has tried rest and NSAIDs for the symptoms. The treatment provided mild relief. Hypertension  This is a chronic problem. The current episode started more than 1 year ago. The problem is controlled. Pertinent negatives include no chest pain, palpitations or shortness of breath. Agents associated with hypertension include NSAIDs. Risk factors for coronary artery disease include sedentary lifestyle, obesity, dyslipidemia and post-menopausal state. Past treatments include ACE inhibitors and diuretics. The current treatment provides significant improvement. Compliance problems include exercise and diet. Patient's medications, allergies, past medical,surgical, social and family histories were reviewed and updated as appropriate.      Past Medical History:   Diagnosis Date    Cancer [facility])     breast, left    Congenital hernia of the diaphragm     History of therapeutic radiation     Hyperlipidemia     Hypertension      Past Surgical History:   Procedure Laterality Date    APPENDECTOMY      BACK SURGERY  11/09/2020    several back injections    BREAST LUMPECTOMY      BREAST SURGERY      left mastectomy    EYE SURGERY      HERNIA REPAIR      lapascopic hiatal hernia repair    MASTECTOMY      TOTAL KNEE ARTHROPLASTY  04/2016    cangemi-  multiple cortison injections - left knee    TUBAL LIGATION       Family History   Problem Relation Age of Onset    High Blood Pressure Mother     Heart Disease Mother     High Blood Pressure Sister     Diabetes Sister     High Blood Pressure Brother     Heart Disease Brother     Cancer Maternal Uncle     Breast Cancer Paternal Aunt     Uterine Cancer Maternal Grandmother     Ovarian Cancer Maternal Grandmother      Social History     Tobacco Use    Smoking status: Never Smoker    Smokeless tobacco: Never Used   Substance Use Topics    Alcohol use: No     Alcohol/week: 0.0 standard drinks      Allergies   Allergen Reactions    Cephalosporins Hives    Mirtazapine      Increased \"knee pain\"    Nickel     Tetanus Toxoids     Tramadol Nausea Only     Current Outpatient Medications on File Prior to Visit   Medication Sig Dispense Refill    aspirin 81 MG EC tablet Take 81 mg by mouth daily      meloxicam (MOBIC) 7.5 MG tablet TAKE 1 TABLET DAILY 90 tablet 3    hydroCHLOROthiazide (HYDRODIURIL) 25 MG tablet TAKE 1 TABLET DAILY 90 tablet 3    atenolol (TENORMIN) 100 MG tablet TAKE 1 TABLET DAILY 90 tablet 3    clotrimazole-betamethasone (LOTRISONE) 1-0.05 % cream Apply topically 2 times daily Apply topically 2 times daily.  15 g 5    pravastatin (PRAVACHOL) 20 MG tablet Take 1 tablet by mouth daily 90 tablet 3    clonazePAM (KLONOPIN) 1 MG tablet TAKE 1 TABLET NIGHTLY AS   NEEDED FOR SLEEP 90 tablet 1    lisinopril (PRINIVIL;ZESTRIL) 40 MG tablet TAKE 1 TABLET DAILY 90 tablet 3    Breast Prosthesis MISC by Does not apply route left breast prosthetic and bra.  Please give max for insurance coverage. 1 each 5    acetaminophen (TYLENOL ARTHRITIS PAIN) 650 MG CR tablet Take 650 mg by mouth 2 times daily       calcium carbonate-vitamin D (CALCIUM 600 + D) 600-400 MG-UNIT TABS per tab Take 1 tablet by mouth every other day        No current facility-administered medications on file prior to visit. Review of Systems   Constitutional: Positive for activity change ( decreasing due to knee pain). Negative for fatigue and unexpected weight change. HENT: Negative for congestion, postnasal drip and rhinorrhea. Respiratory: Negative for chest tightness and shortness of breath. Cardiovascular: Negative for chest pain and palpitations. Gastrointestinal: Positive for diarrhea. Negative for abdominal pain and constipation. Nausea:  occ'l. Genitourinary: Negative for bladder incontinence and difficulty urinating. Musculoskeletal: Positive for arthralgias and back pain ( knees). Neurological: Negative for numbness. OBJECTIVE:    /86   Temp 97.1 °F (36.2 °C)   Wt 201 lb (91.2 kg)   BMI 34.50 kg/m²    Physical Exam  Constitutional:       Appearance: She is well-developed. She is obese. HENT:      Head: Normocephalic and atraumatic. Right Ear: Tympanic membrane and external ear normal.      Left Ear: Tympanic membrane and external ear normal.      Nose: Nose normal.      Mouth/Throat:      Mouth: Mucous membranes are moist.      Pharynx: No posterior oropharyngeal erythema. Eyes:      General:         Right eye: No discharge. Conjunctiva/sclera: Conjunctivae normal.   Neck:      Thyroid: No thyromegaly. Vascular: No JVD. Trachea: No tracheal deviation. Cardiovascular:      Rate and Rhythm: Normal rate and regular rhythm. Heart sounds: Normal heart sounds. Pulmonary:      Effort: Pulmonary effort is normal. No respiratory distress. Breath sounds: Normal breath sounds. No rales.    Musculoskeletal:      Cervical back: Normal range of motion and neck supple. Right lower leg: No edema. Left lower leg: No edema. Lymphadenopathy:      Cervical: No cervical adenopathy. Skin:     General: Skin is warm and dry. Neurological:      General: No focal deficit present. Mental Status: She is alert and oriented to person, place, and time. Psychiatric:         Mood and Affect: Mood normal.         Behavior: Behavior normal.         ASSESSMENT/PLAN:    Van Philippe was seen today for follow-up. Diagnoses and all orders for this visit:    Menopause  -     DEXA BONE DENSITY 2 SITES; Future    Mixed hyperlipidemia  -     Comprehensive Metabolic Panel, Fasting; Future  -     CBC Auto Differential; Future  -     Lipid, Fasting; Future  -     TSH with Reflex; Future    Vitamin D deficiency  -     VITAMIN D 25 HYDROXY; Future    History of DVT of lower extremity  Patient has been treated for 6 months with Xarelto. She was able to continue her Mobic and had no bleeding problems. She continues to take her Mobic for back and knee pain. Personal history of malignant neoplasm of breast  Still followed by oncology. Recent finding on mammogram with upcoming biopsy pending. Chronic pain of both knees  Patient continues to use Mobic. Patient has been treated for 6 months with Xarelto. She was able to continue her Mobic and had no bleeding problems. She continues to take her Mobic for back and knee pain. Return in about 6 months (around 7/5/2022). Please note portions of this note were completed with a voicerecognition program.  Efforts were made to edit the dictations but occasionally words are mis-transcribed.

## 2022-01-13 DIAGNOSIS — I82.4Y2 ACUTE DEEP VEIN THROMBOSIS (DVT) OF PROXIMAL VEIN OF LEFT LOWER EXTREMITY (HCC): ICD-10-CM

## 2022-01-13 NOTE — TELEPHONE ENCOUNTER
Medication:   Requested Prescriptions     Pending Prescriptions Disp Refills    rivaroxaban (XARELTO) 20 MG TABS tablet 30 tablet 1     Sig: TAKE 1 TABLET BY MOUTH EVERY DAY WITH BREAKFAST       Last Filled:      Patient Phone Number: [phone] (home)     Last appt: 1/5/2022   Next appt: 7/5/2022    Last Labs DM:   Lab Results   Component Value Date    LABA1C 5.8 06/14/2021     Last Lipid:   Lab Results   Component Value Date    CHOL 222 09/20/2021    TRIG 173 09/20/2021    HDL 61 09/20/2021    LDLCALC 131 09/20/2021     Last PSA: No results found for: PSA  Last Thyroid:   Lab Results   Component Value Date    TSH 1.100 09/20/2021    T4FREE 0.97 06/01/2013

## 2022-01-18 ENCOUNTER — TELEPHONE (OUTPATIENT)
Dept: SURGERY | Age: 77
End: 2022-01-18

## 2022-01-18 NOTE — TELEPHONE ENCOUNTER
Patient called because she had a Left breast ultrasound done on 8/2/21. She received a letter in the mail reminding her of the recommendation to have another Left breast ultrasound done in 6 months. Patient needs a order placed for a Left breast ultrasound so she can schedule. Please place order and call the patient back at [phone]. Thank you.

## 2022-01-24 DIAGNOSIS — Z80.3 FAMILY HISTORY OF MALIGNANT NEOPLASM OF BREAST: ICD-10-CM

## 2022-01-24 DIAGNOSIS — Z90.12 S/P LEFT MASTECTOMY: ICD-10-CM

## 2022-01-24 DIAGNOSIS — Z85.3 PERSONAL HISTORY OF MALIGNANT NEOPLASM OF BREAST: Primary | ICD-10-CM

## 2022-01-26 ENCOUNTER — HOSPITAL ENCOUNTER (OUTPATIENT)
Dept: GENERAL RADIOLOGY | Age: 77
Discharge: HOME | End: 2022-01-26
Payer: MEDICARE

## 2022-01-26 ENCOUNTER — HOSPITAL ENCOUNTER (OUTPATIENT)
Dept: ULTRASOUND IMAGING | Age: 77
Discharge: HOME | End: 2022-01-26
Payer: MEDICARE

## 2022-01-26 DIAGNOSIS — Z85.3 PERSONAL HISTORY OF MALIGNANT NEOPLASM OF BREAST: ICD-10-CM

## 2022-01-26 DIAGNOSIS — Z78.0 MENOPAUSE: ICD-10-CM

## 2022-01-26 DIAGNOSIS — Z90.12 S/P LEFT MASTECTOMY: ICD-10-CM

## 2022-01-26 DIAGNOSIS — Z80.3 FAMILY HISTORY OF MALIGNANT NEOPLASM OF BREAST: ICD-10-CM

## 2022-01-26 PROCEDURE — 76642 ULTRASOUND BREAST LIMITED: CPT

## 2022-01-26 PROCEDURE — 77080 DXA BONE DENSITY AXIAL: CPT

## 2022-01-31 DIAGNOSIS — Z85.3 PERSONAL HISTORY OF MALIGNANT NEOPLASM OF BREAST: Primary | ICD-10-CM

## 2022-01-31 DIAGNOSIS — R92.2 DENSE BREASTS: ICD-10-CM

## 2022-01-31 DIAGNOSIS — Z80.3 FAMILY HISTORY OF MALIGNANT NEOPLASM OF BREAST: ICD-10-CM

## 2022-01-31 DIAGNOSIS — Z12.31 ENCOUNTER FOR SCREENING MAMMOGRAM FOR HIGH-RISK PATIENT: ICD-10-CM

## 2022-01-31 DIAGNOSIS — Z90.12 STATUS POST LEFT MASTECTOMY: ICD-10-CM

## 2022-01-31 DIAGNOSIS — Z85.3 PERSONAL HISTORY OF BREAST CANCER: ICD-10-CM

## 2022-02-08 ENCOUNTER — PATIENT MESSAGE (OUTPATIENT)
Dept: FAMILY MEDICINE CLINIC | Age: 77
End: 2022-02-08

## 2022-02-08 DIAGNOSIS — F51.01 PRIMARY INSOMNIA: ICD-10-CM

## 2022-02-08 NOTE — TELEPHONE ENCOUNTER
From: [facility]  To: Dr. Rody Bruce  Sent: 2/8/2022 12:00 PM EST  Subject: Renew script for clonazepam 1mg    Would you please renew this med and send request to my mail in service. Thank you.

## 2022-02-09 RX ORDER — CLONAZEPAM 1 MG/1
TABLET ORAL
Qty: 90 TABLET | Refills: 1 | Status: SHIPPED | OUTPATIENT
Start: 2022-02-09 | End: 2022-08-12 | Stop reason: SDUPTHER

## 2022-03-02 NOTE — TELEPHONE ENCOUNTER
Medication:   Requested Prescriptions     Pending Prescriptions Disp Refills    clonazePAM (KLONOPIN) 1 MG tablet 90 tablet 1     Sig: TAKE 1 TABLET NIGHTLY AS   NEEDED FOR SLEEP      Last Filled:  11/2021    Patient Phone Number: [phone] (home)     Last appt: 1/5/2022   Next appt: 7/5/2022    Last OARRS:   RX Monitoring 12/13/2019   Attestation -   Periodic Controlled Substance Monitoring No signs of potential drug abuse or diversion identified. PDMP Monitoring:    Last PDMP [employer] [place] as Reviewed [facility]):  Review User Review Instant Review Result   John Wellingtonven 5/17/2021  6:06 PM Reviewed PDMP [1]     Preferred Pharmacy:   [facility], [address] [place] [address] [phone]  f [phone]  [address]  Phone: [phone] Fax: [phone]     [place] [address] [address] - P [phone] - F [phone]  [address] [address]  Phone: [phone] Fax: [phone] [facility] Hematology / Oncology  Progress Note  Name: Danielle Thrasher  :  1972    MRN:  [id number]    --------------------    Assessment / Plan:  Stage IIA (pT2 pN1a cM0), hormone positive, HER-2 negative, right-sided breast cancer.  # Right breast mastectomy with sentinel lymph node 2022.  # Oncotype Dx score 11.    Over the course of our visit, Danielle and I reviewed her pathology report in detail.  We reviewed the macro met in an isolated lymph node without extracapsular spread as well as 6 other normal lymph nodes.  We reviewed the large tumor with negative margins and the lack of lymphovascular invasion.  Taking all of this into account with her Oncotype score of 11, I would not recommend adjuvant chemotherapy particularly given the hormone positive lobular nature of her breast cancer.  I think any value of chemotherapy would likely be driven primarily by early menopause achieved with chemotherapy and her outcomes should be quite good based upon Plan B trial outcome data for women with low-volume antonino disease and low Oncotype score.  I would recommend adjuvant radiotherapy given the presence of the macro met as well as lack of plans for chemotherapy.  We reviewed that the goal would be reduction in local regional recurrence risk.  We reviewed the logistics of administration as well as potential side effects.  A referral has been placed.  I think the larger question right now will revolve around choice of hormone therapy and for now we'll plan to start with tamoxifen anticipating that she will likely enter menopause very soon at which point we will switch her over to an aromatase inhibitor.  We could consider initiation of early menopause with ovarian suppression either surgically or medically as well, but again her Oncotype score is quite low and natural menopause is likely soon.  We reviewed the logistics of tamoxifen in detail focusing on the possibility of vasomotor symptoms,  "arthralgias, body changes, mood changes, cognitive changes, CV risk as well as potential for bone thinning.  We will likely plan to get a DEXA scan at some point in the future and encouraged the importance of calcium and vitamin D as well as activity moving forward.  Her case will be discussed at tumor conference as well.  Case discussed w/ Dr. Krueger.    Enrrique Huitron MD    --------------------    Interval History:  Danielle returns for follow up of breast cancer accompanied by her .  She is recovering well from surgery.  She has no concerns or complaints today.    --------------------    Physical Exam:  VS: /71 (BP Location: Right arm, Patient Position: Chair, Cuff Size: Adult Regular)   Pulse 60   Ht 1.6 m (5' 3\")   Wt 77.7 kg (171 lb 6.4 oz)   SpO2 100%   BMI 30.36 kg/m    GEN: Well appearing.    Labs / Imaging / Path:  We reviewed pathology reports  "

## 2022-04-05 ENCOUNTER — OFFICE VISIT (OUTPATIENT)
Dept: SURGERY | Age: 77
End: 2022-04-05
Payer: MEDICARE

## 2022-04-05 ENCOUNTER — HOSPITAL ENCOUNTER (OUTPATIENT)
Dept: WOMENS IMAGING | Age: 77
Discharge: HOME | End: 2022-04-05
Payer: MEDICARE

## 2022-04-05 VITALS
RESPIRATION RATE: 18 BRPM | SYSTOLIC BLOOD PRESSURE: 124 MMHG | HEART RATE: 58 BPM | OXYGEN SATURATION: 97 % | WEIGHT: 193.6 LBS | HEIGHT: 64 IN | DIASTOLIC BLOOD PRESSURE: 70 MMHG | BODY MASS INDEX: 33.05 KG/M2

## 2022-04-05 VITALS — HEIGHT: 64 IN | WEIGHT: 190 LBS | BODY MASS INDEX: 32.44 KG/M2

## 2022-04-05 DIAGNOSIS — Z90.12 STATUS POST LEFT MASTECTOMY: ICD-10-CM

## 2022-04-05 DIAGNOSIS — N64.4 BREAST PAIN: ICD-10-CM

## 2022-04-05 DIAGNOSIS — Z85.3 ENCOUNTER FOR FOLLOW-UP SURVEILLANCE OF BREAST CANCER: ICD-10-CM

## 2022-04-05 DIAGNOSIS — Z12.31 SCREENING MAMMOGRAM FOR HIGH-RISK PATIENT: Primary | ICD-10-CM

## 2022-04-05 DIAGNOSIS — Z80.3 FAMILY HISTORY OF MALIGNANT NEOPLASM OF BREAST: ICD-10-CM

## 2022-04-05 DIAGNOSIS — Z85.3 PERSONAL HISTORY OF BREAST CANCER: ICD-10-CM

## 2022-04-05 DIAGNOSIS — R92.2 DENSE BREASTS: ICD-10-CM

## 2022-04-05 DIAGNOSIS — Z85.3 PERSONAL HISTORY OF MALIGNANT NEOPLASM OF BREAST: ICD-10-CM

## 2022-04-05 DIAGNOSIS — Z12.39 ENCOUNTER FOR SCREENING BREAST EXAMINATION: Primary | ICD-10-CM

## 2022-04-05 DIAGNOSIS — Z90.12 HISTORY OF LEFT MASTECTOMY: ICD-10-CM

## 2022-04-05 DIAGNOSIS — Z12.31 ENCOUNTER FOR SCREENING MAMMOGRAM FOR HIGH-RISK PATIENT: ICD-10-CM

## 2022-04-05 DIAGNOSIS — Z08 ENCOUNTER FOR FOLLOW-UP SURVEILLANCE OF BREAST CANCER: ICD-10-CM

## 2022-04-05 DIAGNOSIS — R92.8 ABNORMAL FINDING ON BREAST IMAGING: ICD-10-CM

## 2022-04-05 PROCEDURE — 1036F TOBACCO NON-USER: CPT | Performed by: NURSE PRACTITIONER

## 2022-04-05 PROCEDURE — G8417 CALC BMI ABV UP PARAM F/U: HCPCS | Performed by: NURSE PRACTITIONER

## 2022-04-05 PROCEDURE — 99213 OFFICE O/P EST LOW 20 MIN: CPT | Performed by: NURSE PRACTITIONER

## 2022-04-05 PROCEDURE — 4040F PNEUMOC VAC/ADMIN/RCVD: CPT | Performed by: NURSE PRACTITIONER

## 2022-04-05 PROCEDURE — 1123F ACP DISCUSS/DSCN MKR DOCD: CPT | Performed by: NURSE PRACTITIONER

## 2022-04-05 PROCEDURE — G8427 DOCREV CUR MEDS BY ELIG CLIN: HCPCS | Performed by: NURSE PRACTITIONER

## 2022-04-05 PROCEDURE — 1090F PRES/ABSN URINE INCON ASSESS: CPT | Performed by: NURSE PRACTITIONER

## 2022-04-05 PROCEDURE — 77063 BREAST TOMOSYNTHESIS BI: CPT

## 2022-04-05 PROCEDURE — G8399 PT W/DXA RESULTS DOCUMENT: HCPCS | Performed by: NURSE PRACTITIONER

## 2022-04-05 ASSESSMENT — ENCOUNTER SYMPTOMS
COUGH: 0
ABDOMINAL PAIN: 0
SHORTNESS OF BREATH: 0

## 2022-04-05 NOTE — PROGRESS NOTES
[facility])   Surgical Breast Oncology       CC:  Breast check, mammogram results     HPI: Shaina Lu is a 76 y.o. woman here for screening breast exam and mammogram results. She has a history of left breast/chest wall tenderness located in the lower inner portion or her chest wall below her mastectomy incision, tenderness is in one spot with deep palpation; has not experienced pain for 6-12 months. She has a personal history of left breast cancer 2008, treated for an invasive ductal carcinoma. She reports having a left mastectomy with node evaluation. She required chemotherapy with Herceptin. She did not undergo radiation and did not take anti-estrogens. She has also had left diaphragm surgery ~7-8 years ago (~[id number])    She states that she does perform routine self breast evaluations and has not noticed any new abnormalities such as masses, skin changes, color changes,nipple discharge, or changes to the nipple-areolar complex. INTERVAL HX:  On 7/20/2017 she underwent right breast diagnostic imaging. There is a focal grouping of coarse calcifications in the right breast that are considered low suspicion for malignancy. They're likely involutional. BI-RADS 3.     On 2/2/2018 she underwent right breast diagnostic mammography. Benign calcifications were again noted with a focal grouping of the lateral right breast. This appears unchanged. There is no associated mass or distortion. BI-RADS 2.      On 2/4/2019 she underwent unilateral right breast imaging. Stable changes including calcifications are noted in the right breast. There are no new concerning findings suggestive of malignancy. BI-RADS 2.     On 3/6/2020 she underwent unilateral right screening mammogram.  Postsurgical changes are noted in the right breast.  Stable calcifications in the right breast are without change over multiple prior exams. There are no new concerning findings suggestive of malignancy.   BI-RADS 2.     On 4/1/2021 she underwent right screening mammogram.  There are no new concerning findings suggestive of malignancy. BI-RADS 2. On 8/2/2021 she underwent left breast ultrasound for left breast/chest wall pain. No abnormal sonographic findings in the area of pain in the inferior left chest (which is below the mastectomy incision line)/left upper abdomen. [place] is likely incidental finding of a 0.5 by 0 4 x 0.4 cm hyperechoic lesion in the upper left abdomen.  This lesion does not demonstrate discernible internal vascularity.  This may represent incidental finding of a lipoma. BI-RADS 3.  6-month follow-up ultrasound is recommended. On 1/26/2022 she underwent left breast ultrasound for follow-up of left breast/chest wall pain and possible incidental finding of a lipoma. Stable 0.5 x 0.4 cm echogenic lesion in the left upper abdominal wall without any internal vascularity.  This is similar to prior comparison and may represent a possible lipoma. BI-RADS 3. Short interval follow-up is recommended in 6 months. On 4/5/2022 she underwent right screening mammogram.  No new concerning findings suggestive of malignancy. BI-RADS 2.     Past Medical History:   Diagnosis Date    Breast cancer [facility])     Cancer ([facility])     breast, left    Congenital hernia of the diaphragm     History of therapeutic radiation     Hyperlipidemia     Hypertension        Past Surgical History:   Procedure Laterality Date    APPENDECTOMY      BACK SURGERY  11/09/2020    several back injections    BREAST LUMPECTOMY      BREAST SURGERY      left mastectomy    EYE SURGERY      HERNIA REPAIR      lapascopic hiatal hernia repair    MASTECTOMY      TOTAL KNEE ARTHROPLASTY  04/2016    cangemi-  multiple cortison injections - left knee    TUBAL LIGATION         Family History   Problem Relation Age of Onset    High Blood Pressure Mother     Heart Disease Mother     High Blood Pressure Sister     Diabetes Sister     High Blood Pressure Brother     Heart Disease Brother     Cancer Maternal Uncle     Breast Cancer Paternal Aunt     Uterine Cancer Maternal Grandmother     Ovarian Cancer Maternal Grandmother      Family History:  Maternal grandmother: Uterine cancer  Paternal aunt: breast cancer  No additional family history of breast or ovarian cancer     Hormonal History:   a.0  m.0  Menarche at age 4. Postmenopausal at age43  Hysterectomy: no hysterectomy  No estrogen supplementation  OCP not taking    Allergies as of 2022 - Fully Reviewed 2022   Allergen Reaction Noted    Cephalosporins Hives 2012    Mirtazapine  2021    Nickel  10/27/2017    Tetanus toxoids  2012    Tramadol Nausea Only 2021       Social History     Tobacco Use    Smoking status: Never Smoker    Smokeless tobacco: Never Used   Vaping Use    Vaping Use: Never used   Substance Use Topics    Alcohol use: No     Alcohol/week: 0.0 standard drinks    Drug use: No       Current Outpatient Medications on File Prior to Visit   Medication Sig Dispense Refill    clonazePAM (KLONOPIN) 1 MG tablet TAKE 1 TABLET NIGHTLY AS   NEEDED FOR SLEEP 90 tablet 1    alendronate (FOSAMAX) 70 MG tablet Take 1 tablet by mouth every 7 days 12 tablet 1    aspirin 81 MG EC tablet Take 81 mg by mouth daily      meloxicam (MOBIC) 7.5 MG tablet TAKE 1 TABLET DAILY 90 tablet 3    hydroCHLOROthiazide (HYDRODIURIL) 25 MG tablet TAKE 1 TABLET DAILY 90 tablet 3    atenolol (TENORMIN) 100 MG tablet TAKE 1 TABLET DAILY 90 tablet 3    pravastatin (PRAVACHOL) 20 MG tablet Take 1 tablet by mouth daily 90 tablet 3    lisinopril (PRINIVIL;ZESTRIL) 40 MG tablet TAKE 1 TABLET DAILY 90 tablet 3    Breast Prosthesis MISC by Does not apply route left breast prosthetic and bra. Please give max for insurance coverage.  1 each 5    acetaminophen (TYLENOL ARTHRITIS PAIN) 650 MG CR tablet Take 650 mg by mouth 2 times daily       calcium carbonate-vitamin D (CALCIUM 600 + D) 600-400 MG-UNIT TABS per tab Take 1 tablet by mouth every other day       rivaroxaban (XARELTO) 20 MG TABS tablet TAKE 1 TABLET BY MOUTH EVERY DAY WITH BREAKFAST (Patient not taking: Reported on 4/5/2022) 30 tablet 1    clotrimazole-betamethasone (LOTRISONE) 1-0.05 % cream Apply topically 2 times daily Apply topically 2 times daily. (Patient not taking: Reported on 4/5/2022) 15 g 5     No current facility-administered medications on file prior to visit. Medications: documentation has been reviewed in the electronic medical record and patient office intake form. REVIEW OF SYSTEMS:  Constitutional: Negative for fever  HENT: Negative for sore throat  Eyes: Negative for redness   Respiratory: Negative for dyspnea, cough  Cardiovascular: Negative for chest pain  Gastrointestinal: Negative for vomiting, diarrhea   Genitourinary: Negative for hematuria   Musculoskeletal: Negative for arthralgias   Skin: Negative for rash  Neurological: Negative for syncope  Hematological: Negative for adenopathy  Psychiatric/Behavorial: Negative for anxiety         PHYSICAL EXAM:  /70   Pulse 58   Resp 18   Ht 5' 4\" (1.626 m)   Wt 193 lb 9.6 oz (87.8 kg)   SpO2 97%   BMI 33.23 kg/m²   Constitutional: She appearswell-nourished. No apparent distress. Breast: The patient was examined in the upright and supine position. Breasts are symmetrically ptotic. Right: No new masses or changes in breast contour. No skin changes of the breast or nipple areolar complex. No nipple inversion or discharge. No erythema, thickening (peau d'orange), or dimpling. Left: The breast has been surgically removed. Well healed mastectomy scars. There is redundant skin along the chest wall. No change in chest wall contour. There are no masses or skin changes. There is no axillary lymphadenopathy palpated bilaterally.    Head: Normocephalic and atraumatic:   Eyes: EOM are normal. Pupils are equal, round, and reactive to light. Neck: Neck supple. No tracheal deviation present. No obvious mass. Lymphatics: No palpable supraclavicular, cervical, or axillary lymphadenopathy  Skin: No rash noted. No erythema. Neurologic: alert and oriented. Extremities: appear well perfused. ASSESSMENT:  - Encounter for screening breast exam  - Abnormal breast imaging - 0.5 x 0.4 cm echogenic lesion in the left upper abdominal wall on U/S 8/2/2021; likely represents a possible lipoma. Stable on 6 month f/u imaging 1/26/2022.   - History of Left chest wall pain - no concerning palpable findings on clinical exam.  Pain is focal and occurs with deep palpation which is more likely rib or chest wall related vs breast especially given history of left mastectomy. CTPA 7/8/2021 shows no acute bone or soft tissue abnormality. Possible post-surgical pain musculoskeletal pain. No concerning findings on U/S for pain. - Personal History of Breast Cancer - s/p left mastectomy with maryann evaluation, s/p chemotherapy with Herceptin. Did not undergo radiation or take antiestrogens. - Encounter for follow-up surveillance of breast cancer  - Family History of Breast and Ovarian Cancer       PLAN:   · 6 month f/u Left breast/chest wall U/S. Due 7/2022. Will call with results. If normal, can f/u with mammogram and clinical breast exam in one year, 4/2023   Continue annual clinical breast exam   Annual screening mammogram due 4/2023   Signs/symptoms of recurrence were reviewed. She verbalizes understanding that she should notify the office if she identifies any abnormalities on self evaluation.    Follow up cancer surveillance discussed    Discussed the importance of breast awareness including the importance and technique of self breast exams   Most recent imaging was reviewed, documented above, the results were discussed with the patient, all questions answered   Healthy Lifestyle Recommendations: healthy diet (decrease consumption of red meat, increase fresh fruits and vegetables), decreased alcohol consumption (less than 4 drinks/week), adequate sleep (goal 6-8 hours), routine exercise (goal 150 minutes/week or greater), weight control. AZUL Olivares-CNP  [facility])   Surgical Breast Oncology   [phone]    All of the patient's questions were answered at this time however, she was encouraged to call the office with any further inquiries. Approximately 20 minutes of time were spent in preparation, direct patient contact, counseling, care coordination, documentation and activities otherwise related to this encounter.

## 2022-04-05 NOTE — PATIENT INSTRUCTIONS
Healthy Lifestyle Recommendations: healthy diet (decrease consumption of red meat, increase fresh fruits and vegetables), decreased alcohol consumption (less than 4 drinks/week), adequate sleep (goal 6-8 hours), routine exercise (goal 150 minutes/week or greater), weight control. Patient Education        Breast Self-Exam: Care Instructions  Your Care Instructions     A breast self-exam is when you check your breasts for lumps or changes. This regular exam helps you learn how your breasts normally look and feel. Mostbreast problems or changes are not because of cancer. Breast self-exam is not a substitute for a mammogram. Having regular breast exams by your doctor and regular mammograms improve your chances of finding anyproblems with your breasts. Some women set a time each month to do a step-by-step breast self-exam. Other women like a less formal system. They might look at their breasts as they brushtheir teeth, or feel their breasts once in a while in the shower. If you notice a change in your breast, tell your doctor. Follow-up care is a key part of your treatment and safety. Be sure to make and go to all appointments, and call your doctor if you are having problems. It's also a good idea to know your test results and keep alist of the medicines you take. How do you do a breast self-exam?   The best time to examine your breasts is usually one week after your menstrual period begins. Your breasts should not be tender then. If you do not have periods, you might do your exam on a day of the month that is easy to remember.  To examine your breasts:  ? Remove all your clothes above the waist and lie down. When you are lying down, your breast tissue spreads evenly over your chest wall, which makes it easier to feel all your breast tissue. ?  Use the pads--not the fingertips--of the 3 middle fingers of your left hand to check your right breast. Move your fingers slowly in small coin-sized circles that overlap. ? Use three levels of pressure to feel of all your breast tissue. Use light pressure to feel the tissue close to the skin surface. Use medium pressure to feel a little deeper. Use firm pressure to feel your tissue close to your breastbone and ribs. Use each pressure level to feel your breast tissue before moving on to the next spot. ? Check your entire breast, moving up and down as if following a strip from the collarbone to the bra line, and from the armpit to the ribs. Repeat until you have covered the entire breast.  ? Repeat this procedure for your left breast, using the pads of the 3 middle fingers of your right hand.  To examine your breasts while in the shower:  ? Place one arm over your head and lightly soap your breast on that side. ? Using the pads of your fingers, gently move your hand over your breast (in the strip pattern described above), feeling carefully for any lumps or changes. ? Repeat for the other breast.   Have your doctor inspect anything you notice to see if you need further testing. Where can you learn more? Go to [email or web address]. org and sign in to your [employer] account. Enter P148 in the [facility] box to learn more about \"Breast Self-Exam: Care Instructions. \"     If you do not have an account, please click on the \"Sign Up Now\" link. Current as of: September 8, 2021               Content Version: 13.2  © 2006-2022 [employer]wise, Inc[employer]. Care instructions adapted under license by [facility] ([facility]). If you have questions about a medical condition or this instruction, always ask your healthcare professional. [place] [address] any warranty or liability for your use of this information.

## 2022-05-11 ENCOUNTER — OFFICE VISIT (OUTPATIENT)
Dept: FAMILY MEDICINE CLINIC | Age: 77
End: 2022-05-11
Payer: MEDICARE

## 2022-05-11 ENCOUNTER — TELEPHONE (OUTPATIENT)
Dept: FAMILY MEDICINE CLINIC | Age: 77
End: 2022-05-11

## 2022-05-11 VITALS
WEIGHT: 188 LBS | TEMPERATURE: 96.8 F | SYSTOLIC BLOOD PRESSURE: 120 MMHG | BODY MASS INDEX: 32.27 KG/M2 | DIASTOLIC BLOOD PRESSURE: 70 MMHG

## 2022-05-11 DIAGNOSIS — M19.90 ARTHRITIS: Primary | ICD-10-CM

## 2022-05-11 DIAGNOSIS — R93.89 ENDOMETRIAL THICKENING ON ULTRASOUND: ICD-10-CM

## 2022-05-11 DIAGNOSIS — N18.31 STAGE 3A CHRONIC KIDNEY DISEASE (HCC): ICD-10-CM

## 2022-05-11 PROBLEM — N18.30 CHRONIC RENAL DISEASE, STAGE III (HCC): Status: ACTIVE | Noted: 2022-05-11

## 2022-05-11 PROCEDURE — G8427 DOCREV CUR MEDS BY ELIG CLIN: HCPCS | Performed by: FAMILY MEDICINE

## 2022-05-11 PROCEDURE — 3288F FALL RISK ASSESSMENT DOCD: CPT | Performed by: FAMILY MEDICINE

## 2022-05-11 PROCEDURE — G8399 PT W/DXA RESULTS DOCUMENT: HCPCS | Performed by: FAMILY MEDICINE

## 2022-05-11 PROCEDURE — 1036F TOBACCO NON-USER: CPT | Performed by: FAMILY MEDICINE

## 2022-05-11 PROCEDURE — 1123F ACP DISCUSS/DSCN MKR DOCD: CPT | Performed by: FAMILY MEDICINE

## 2022-05-11 PROCEDURE — G8417 CALC BMI ABV UP PARAM F/U: HCPCS | Performed by: FAMILY MEDICINE

## 2022-05-11 PROCEDURE — 4040F PNEUMOC VAC/ADMIN/RCVD: CPT | Performed by: FAMILY MEDICINE

## 2022-05-11 PROCEDURE — 1090F PRES/ABSN URINE INCON ASSESS: CPT | Performed by: FAMILY MEDICINE

## 2022-05-11 PROCEDURE — 99214 OFFICE O/P EST MOD 30 MIN: CPT | Performed by: FAMILY MEDICINE

## 2022-05-11 RX ORDER — MELOXICAM 15 MG/1
15 TABLET ORAL DAILY
Qty: 90 TABLET | Refills: 3 | Status: SHIPPED | OUTPATIENT
Start: 2022-05-11 | End: 2022-09-06

## 2022-05-11 RX ORDER — OMEPRAZOLE 20 MG/1
20 CAPSULE, DELAYED RELEASE ORAL
Qty: 90 CAPSULE | Refills: 1 | Status: SHIPPED | OUTPATIENT
Start: 2022-05-11 | End: 2022-09-06 | Stop reason: SDUPTHER

## 2022-05-11 ASSESSMENT — PATIENT HEALTH QUESTIONNAIRE - PHQ9
2. FEELING DOWN, DEPRESSED OR HOPELESS: 0
8. MOVING OR SPEAKING SO SLOWLY THAT OTHER PEOPLE COULD HAVE NOTICED. OR THE OPPOSITE, BEING SO FIGETY OR RESTLESS THAT YOU HAVE BEEN MOVING AROUND A LOT MORE THAN USUAL: 0
9. THOUGHTS THAT YOU WOULD BE BETTER OFF DEAD, OR OF HURTING YOURSELF: 0
3. TROUBLE FALLING OR STAYING ASLEEP: 3
SUM OF ALL RESPONSES TO PHQ QUESTIONS 1-9: 5
SUM OF ALL RESPONSES TO PHQ QUESTIONS 1-9: 5
4. FEELING TIRED OR HAVING LITTLE ENERGY: 2
7. TROUBLE CONCENTRATING ON THINGS, SUCH AS READING THE NEWSPAPER OR WATCHING TELEVISION: 0
SUM OF ALL RESPONSES TO PHQ QUESTIONS 1-9: 5
5. POOR APPETITE OR OVEREATING: 0
SUM OF ALL RESPONSES TO PHQ QUESTIONS 1-9: 5
SUM OF ALL RESPONSES TO PHQ9 QUESTIONS 1 & 2: 0
1. LITTLE INTEREST OR PLEASURE IN DOING THINGS: 0
6. FEELING BAD ABOUT YOURSELF - OR THAT YOU ARE A FAILURE OR HAVE LET YOURSELF OR YOUR FAMILY DOWN: 0

## 2022-05-11 ASSESSMENT — ENCOUNTER SYMPTOMS
DIARRHEA: 1
SHORTNESS OF BREATH: 0
CONSTIPATION: 0
ABDOMINAL PAIN: 0

## 2022-05-11 NOTE — PROGRESS NOTES
SUBJECTIVE:    Grecia Mcconnell is a 76 y.o. female who presents for a follow up visit. Chief Complaint   Patient presents with    Follow-up     Discuss medication, would like to change mobic. Started on methylpredisolone today for knee, having surgery in June. Also, had recent groin u/s that suggested pelvic u/s , does not have OBGYN. Knee Pain   Incident onset: chronic. There was no injury mechanism. The pain is present in the right knee. The quality of the pain is described as aching and stabbing. The pain is moderate. The pain has been fluctuating since onset. The symptoms are aggravated by movement and weight bearing. She has tried rest and NSAIDs for the symptoms. The treatment provided moderate relief. Hip Pain   There was no injury mechanism. The pain is present in the right hip. The quality of the pain is described as aching. The pain is moderate. The pain has been fluctuating since onset. The symptoms are aggravated by weight bearing, palpation and movement. She has tried NSAIDs and rest for the symptoms. The treatment provided mild relief. Patient's medications, allergies, past medical,surgical, social and family histories were reviewed and updated as appropriate.      Past Medical History:   Diagnosis Date    [facility] [address]     Cancer ([facility] [address]     breast, left    Congenital hernia of the diaphragm     History of therapeutic radiation     Hyperlipidemia     Hypertension      Past Surgical History:   Procedure Laterality Date    APPENDECTOMY      BACK SURGERY  11/09/2020    several back injections    BREAST LUMPECTOMY      BREAST SURGERY      left mastectomy    EYE SURGERY      HERNIA REPAIR      lapascopic hiatal hernia repair    MASTECTOMY      TOTAL KNEE ARTHROPLASTY  04/2016    cangemi-  multiple cortison injections - left knee    TUBAL LIGATION       Family History   Problem Relation Age of Onset    High Blood Pressure Mother     Heart Disease Mother     High Blood Pressure Sister     Diabetes Sister     High Blood Pressure Brother     Heart Disease Brother     Cancer Maternal Uncle     Breast Cancer Paternal Aunt     Uterine Cancer Maternal Grandmother     Ovarian Cancer Maternal Grandmother      Social History     Tobacco Use    Smoking status: Never Smoker    Smokeless tobacco: Never Used   Substance Use Topics    Alcohol use: No     Alcohol/week: 0.0 standard drinks      Allergies   Allergen Reactions    Cephalosporins Hives    Mirtazapine      Increased \"knee pain\"    Nickel     Tetanus Toxoids     Tramadol Nausea Only     Current Outpatient Medications on File Prior to Visit   Medication Sig Dispense Refill    clonazePAM (KLONOPIN) 1 MG tablet TAKE 1 TABLET NIGHTLY AS   NEEDED FOR SLEEP 90 tablet 1    alendronate (FOSAMAX) 70 MG tablet Take 1 tablet by mouth every 7 days 12 tablet 1    aspirin 81 MG EC tablet Take 81 mg by mouth daily      hydroCHLOROthiazide (HYDRODIURIL) 25 MG tablet TAKE 1 TABLET DAILY 90 tablet 3    atenolol (TENORMIN) 100 MG tablet TAKE 1 TABLET DAILY 90 tablet 3    clotrimazole-betamethasone (LOTRISONE) 1-0.05 % cream Apply topically 2 times daily Apply topically 2 times daily. 15 g 5    pravastatin (PRAVACHOL) 20 MG tablet Take 1 tablet by mouth daily 90 tablet 3    lisinopril (PRINIVIL;ZESTRIL) 40 MG tablet TAKE 1 TABLET DAILY 90 tablet 3    Breast Prosthesis MISC by Does not apply route left breast prosthetic and bra. Please give max for insurance coverage. 1 each 5    acetaminophen (TYLENOL ARTHRITIS PAIN) 650 MG CR tablet Take 650 mg by mouth 2 times daily       calcium carbonate-vitamin D (CALCIUM 600 + D) 600-400 MG-UNIT TABS per tab Take 1 tablet by mouth every other day        No current facility-administered medications on file prior to visit. Review of Systems   Respiratory: Negative for shortness of breath. Cardiovascular: Negative for chest pain.    Gastrointestinal: Positive for diarrhea ( occ'l). Negative for abdominal pain and constipation. Musculoskeletal: Positive for arthralgias ( hip). OBJECTIVE:    /70   Temp 96.8 °F (36 °C)   Wt 188 lb (85.3 kg)   BMI 32.27 kg/m²    Physical Exam  Constitutional:       Appearance: She is well-developed. HENT:      Head: Normocephalic and atraumatic. Right Ear: External ear normal.      Left Ear: External ear normal.      Nose: Nose normal.      Mouth/Throat:      Mouth: Mucous membranes are moist.      Pharynx: No posterior oropharyngeal erythema. Eyes:      General:         Right eye: No discharge. Conjunctiva/sclera: Conjunctivae normal.   Neck:      Thyroid: No thyromegaly. Vascular: No JVD. Trachea: No tracheal deviation. Cardiovascular:      Rate and Rhythm: Normal rate and regular rhythm. Heart sounds: Normal heart sounds. Pulmonary:      Effort: Pulmonary effort is normal. No respiratory distress. Breath sounds: Normal breath sounds. No rales. Musculoskeletal:      Cervical back: Normal range of motion and neck supple. Right lower leg: No edema. Left lower leg: No edema. Lymphadenopathy:      Cervical: No cervical adenopathy. Skin:     General: Skin is warm and dry. Neurological:      Mental Status: She is alert and oriented to person, place, and time. Psychiatric:         Mood and Affect: Mood normal.         Behavior: Behavior normal.         ASSESSMENT/PLAN:    Za Silveira was seen today for follow-up. Diagnoses and all orders for this visit:    Arthritis  -     omeprazole (PRILOSEC) 20 MG delayed release capsule; Take 1 capsule by mouth every morning (before breakfast)  -     meloxicam (MOBIC) 15 MG tablet;  Take 1 tablet by mouth daily    Stage 3a chronic kidney disease ([address]  Most recent glomerular filtration estimate was 42 consistent with stage IIIa    Endometrial thickening on ultrasound  -     AFL - Aiden Joy MD, Gynecology, [facility]        Return for regularly scheduled follow-up. Please note portions of this note were completed with a voicerecognition program.  Efforts were made to edit the dictations but occasionally words are mis-transcribed.

## 2022-06-08 NOTE — PROGRESS NOTES
Name_______________________________________Printed:____________________  Date and time of surgery__6/13/22 1230 main OR______________________Arrival Time:_1100_______________   1. The instructions given regarding when and if a patient needs to stop oral intake prior to surgery varies. Follow the specific instructions you were given                  __x_Nothing to eat or to drink after Midnight the night before.                   ____Carbo loading or ERAS instructions will be given to select patients-if you have been given those instructions -please do the following                           The evening before your surgery after dinner before midnight drink 40 ounces of gatorade. If you are diabetic use sugar free. The morning of surgery drink 40 ounces of water. This needs to be finished 3 hours prior to your surgery start time. 2. Take the following pills with a small sip of water on the morning of surgery_atenolol, omeprazole__________________________________________________                  Do not take blood pressure medications ending in pril or sartan the azeem prior to surgery or the morning of surgery_   3. Aspirin, Ibuprofen, Advil, Naproxen, Vitamin E and other Anti-inflammatory products and supplements should be stopped for 5 -7days before surgery or as directed by your physician. 4. Check with your Doctor regarding stopping Plavix, Coumadin,Eliquis, Lovenox,Effient,Pradaxa,Xarelto, Fragmin or other blood thinners and follow their instructions. 5. Do not smoke, and do not drink any alcoholic beverages 24 hours prior to surgery. This includes NA Beer. Refrain from the usage of any recreational drugs. 6. You may brush your teeth and gargle the morning of surgery. DO NOT SWALLOW WATER   7. You MUST make arrangements for a responsible adult to stay on site while you are here and take you home after your surgery. You will not be allowed to leave alone or drive yourself home.   It is strongly suggested someone stay with you the first 24 hrs. Your surgery will be cancelled if you do not have a ride home. 8. A parent/legal guardian must accompany a child scheduled for surgery and plan to stay at the hospital until the child is discharged. Please do not bring other children with you. 9. Please wear simple, loose fitting clothing to the hospital.  Reino Apley not bring valuables (money, credit cards, checkbooks, etc.) Do not wear any makeup (including no eye makeup) or nail polish on your fingers or toes. 10. DO NOT wear any jewelry or piercings on day of surgery. All body piercing jewelry must be removed. 11. If you have ___dentures, they will be removed before going to the OR; we will provide you a container. If you wear ___contact lenses or ___glasses, they will be removed; please bring a case for them. 12. Please see your family doctor/pediatrician for a history & physical and/or concerning medications. Bring any test results/reports from your physician's office. PCP__________________Phone___________H&P Appt. Date________             13 If you  have a Living Will and Durable Power of  for Healthcare, please bring in a copy. 15. Notify your Surgeon if you develop any illness between now and surgery  time, cough, cold, fever, sore throat, nausea, vomiting, etc.  Please notify your surgeon if you experience dizziness, shortness of breath or blurred vision between now & the time of your surgery             15. DO NOT shave your operative site 96 hours prior to surgery. For face & neck surgery, men may use an electric razor 48 hours prior to surgery. 16. Shower the night before or morning of surgery using an antibacterial soap or as you have been instructed. 17. To provide excellent care visitors will be limited to one in the room at any given time. 18.  Please bring picture ID and insurance card.              19.  Visit our web

## 2022-06-13 ENCOUNTER — ANESTHESIA EVENT (OUTPATIENT)
Dept: OPERATING ROOM | Age: 77
End: 2022-06-13
Payer: MEDICARE

## 2022-06-13 ENCOUNTER — ANESTHESIA (OUTPATIENT)
Dept: OPERATING ROOM | Age: 77
End: 2022-06-13
Payer: MEDICARE

## 2022-06-13 ENCOUNTER — HOSPITAL ENCOUNTER (OUTPATIENT)
Age: 77
Setting detail: OUTPATIENT SURGERY
Discharge: HOME | End: 2022-06-13
Attending: OBSTETRICS & GYNECOLOGY | Admitting: OBSTETRICS & GYNECOLOGY
Payer: MEDICARE

## 2022-06-13 VITALS
OXYGEN SATURATION: 95 % | HEIGHT: 64 IN | BODY MASS INDEX: 31.07 KG/M2 | DIASTOLIC BLOOD PRESSURE: 56 MMHG | WEIGHT: 182 LBS | RESPIRATION RATE: 18 BRPM | TEMPERATURE: 97.2 F | HEART RATE: 74 BPM | SYSTOLIC BLOOD PRESSURE: 125 MMHG

## 2022-06-13 PROBLEM — R10.30 LOWER ABDOMINAL PAIN: Status: ACTIVE | Noted: 2022-06-13

## 2022-06-13 PROBLEM — N88.2 CERVICAL STENOSIS (UTERINE CERVIX): Status: ACTIVE | Noted: 2022-06-13

## 2022-06-13 PROBLEM — N85.7 HEMATOMETRA: Status: ACTIVE | Noted: 2022-06-13

## 2022-06-13 LAB
A/G RATIO: 1.8 (ref 1.1–2.2)
ABO/RH: NORMAL
ALBUMIN SERPL-MCNC: 4.7 G/DL (ref 3.4–5)
ALP BLD-CCNC: 51 U/L (ref 40–129)
ALT SERPL-CCNC: 11 U/L (ref 10–40)
ANION GAP SERPL CALCULATED.3IONS-SCNC: 13 MMOL/L (ref 3–16)
ANTIBODY IDENTIFICATION: NORMAL
ANTIBODY IDENTIFICATION: NORMAL
ANTIBODY SCREEN: NORMAL
AST SERPL-CCNC: 19 U/L (ref 15–37)
BILIRUB SERPL-MCNC: 0.8 MG/DL (ref 0–1)
BUN BLDV-MCNC: 15 MG/DL (ref 7–20)
CALCIUM SERPL-MCNC: 10.4 MG/DL (ref 8.3–10.6)
CHLORIDE BLD-SCNC: 104 MMOL/L (ref 99–110)
CO2: 23 MMOL/L (ref 21–32)
CREAT SERPL-MCNC: 0.9 MG/DL (ref 0.6–1.2)
DAT IGG CAPTURE: NORMAL
GFR AFRICAN AMERICAN: >60
GFR NON-AFRICAN AMERICAN: >60
GLUCOSE BLD-MCNC: 107 MG/DL (ref 70–99)
HCT VFR BLD CALC: 41.4 % (ref 36–48)
HEMOGLOBIN: 14 G/DL (ref 12–16)
MCH RBC QN AUTO: 30.4 PG (ref 26–34)
MCHC RBC AUTO-ENTMCNC: 33.7 G/DL (ref 31–36)
MCV RBC AUTO: 90.2 FL (ref 80–100)
PDW BLD-RTO: 13.8 % (ref 12.4–15.4)
PLATELET # BLD: 296 K/UL (ref 135–450)
PMV BLD AUTO: 7.5 FL (ref 5–10.5)
POTASSIUM SERPL-SCNC: 4.3 MMOL/L (ref 3.5–5.1)
RBC # BLD: 4.59 M/UL (ref 4–5.2)
SODIUM BLD-SCNC: 140 MMOL/L (ref 136–145)
TOTAL PROTEIN: 7.3 G/DL (ref 6.4–8.2)
WBC # BLD: 6.3 K/UL (ref 4–11)

## 2022-06-13 PROCEDURE — 2709999900 HC NON-CHARGEABLE SUPPLY: Performed by: OBSTETRICS & GYNECOLOGY

## 2022-06-13 PROCEDURE — 88342 IMHCHEM/IMCYTCHM 1ST ANTB: CPT

## 2022-06-13 PROCEDURE — 85027 COMPLETE CBC AUTOMATED: CPT

## 2022-06-13 PROCEDURE — 3600000004 HC SURGERY LEVEL 4 BASE: Performed by: OBSTETRICS & GYNECOLOGY

## 2022-06-13 PROCEDURE — 2580000003 HC RX 258: Performed by: ANESTHESIOLOGY

## 2022-06-13 PROCEDURE — 86870 RBC ANTIBODY IDENTIFICATION: CPT

## 2022-06-13 PROCEDURE — A4217 STERILE WATER/SALINE, 500 ML: HCPCS | Performed by: OBSTETRICS & GYNECOLOGY

## 2022-06-13 PROCEDURE — 7100000010 HC PHASE II RECOVERY - FIRST 15 MIN: Performed by: OBSTETRICS & GYNECOLOGY

## 2022-06-13 PROCEDURE — 86902 BLOOD TYPE ANTIGEN DONOR EA: CPT

## 2022-06-13 PROCEDURE — 7100000000 HC PACU RECOVERY - FIRST 15 MIN: Performed by: OBSTETRICS & GYNECOLOGY

## 2022-06-13 PROCEDURE — 36415 COLL VENOUS BLD VENIPUNCTURE: CPT

## 2022-06-13 PROCEDURE — 80053 COMPREHEN METABOLIC PANEL: CPT

## 2022-06-13 PROCEDURE — 7100000001 HC PACU RECOVERY - ADDTL 15 MIN: Performed by: OBSTETRICS & GYNECOLOGY

## 2022-06-13 PROCEDURE — 86880 COOMBS TEST DIRECT: CPT

## 2022-06-13 PROCEDURE — 6360000002 HC RX W HCPCS: Performed by: NURSE ANESTHETIST, CERTIFIED REGISTERED

## 2022-06-13 PROCEDURE — 3700000000 HC ANESTHESIA ATTENDED CARE: Performed by: OBSTETRICS & GYNECOLOGY

## 2022-06-13 PROCEDURE — 3700000001 HC ADD 15 MINUTES (ANESTHESIA): Performed by: OBSTETRICS & GYNECOLOGY

## 2022-06-13 PROCEDURE — 2500000003 HC RX 250 WO HCPCS: Performed by: NURSE ANESTHETIST, CERTIFIED REGISTERED

## 2022-06-13 PROCEDURE — 86850 RBC ANTIBODY SCREEN: CPT

## 2022-06-13 PROCEDURE — 2580000003 HC RX 258: Performed by: OBSTETRICS & GYNECOLOGY

## 2022-06-13 PROCEDURE — 86900 BLOOD TYPING SEROLOGIC ABO: CPT

## 2022-06-13 PROCEDURE — 3600000014 HC SURGERY LEVEL 4 ADDTL 15MIN: Performed by: OBSTETRICS & GYNECOLOGY

## 2022-06-13 PROCEDURE — 2720000010 HC SURG SUPPLY STERILE: Performed by: OBSTETRICS & GYNECOLOGY

## 2022-06-13 PROCEDURE — 86901 BLOOD TYPING SEROLOGIC RH(D): CPT

## 2022-06-13 PROCEDURE — 88305 TISSUE EXAM BY PATHOLOGIST: CPT

## 2022-06-13 PROCEDURE — 6370000000 HC RX 637 (ALT 250 FOR IP): Performed by: OBSTETRICS & GYNECOLOGY

## 2022-06-13 PROCEDURE — 7100000011 HC PHASE II RECOVERY - ADDTL 15 MIN: Performed by: OBSTETRICS & GYNECOLOGY

## 2022-06-13 RX ORDER — LIDOCAINE HYDROCHLORIDE 10 MG/ML
1 INJECTION, SOLUTION EPIDURAL; INFILTRATION; INTRACAUDAL; PERINEURAL
Status: DISCONTINUED | OUTPATIENT
Start: 2022-06-13 | End: 2022-06-13 | Stop reason: HOSPADM

## 2022-06-13 RX ORDER — ONDANSETRON 2 MG/ML
4 INJECTION INTRAMUSCULAR; INTRAVENOUS
Status: DISCONTINUED | OUTPATIENT
Start: 2022-06-13 | End: 2022-06-13 | Stop reason: HOSPADM

## 2022-06-13 RX ORDER — SODIUM CHLORIDE 9 MG/ML
INJECTION, SOLUTION INTRAVENOUS CONTINUOUS
Status: DISCONTINUED | OUTPATIENT
Start: 2022-06-13 | End: 2022-06-13 | Stop reason: HOSPADM

## 2022-06-13 RX ORDER — FENTANYL CITRATE 50 UG/ML
INJECTION, SOLUTION INTRAMUSCULAR; INTRAVENOUS PRN
Status: DISCONTINUED | OUTPATIENT
Start: 2022-06-13 | End: 2022-06-13 | Stop reason: SDUPTHER

## 2022-06-13 RX ORDER — PROPOFOL 10 MG/ML
INJECTION, EMULSION INTRAVENOUS PRN
Status: DISCONTINUED | OUTPATIENT
Start: 2022-06-13 | End: 2022-06-13 | Stop reason: SDUPTHER

## 2022-06-13 RX ORDER — OXYCODONE HYDROCHLORIDE 5 MG/1
5 TABLET ORAL
Status: DISCONTINUED | OUTPATIENT
Start: 2022-06-13 | End: 2022-06-13 | Stop reason: HOSPADM

## 2022-06-13 RX ORDER — FAMOTIDINE 10 MG/ML
INJECTION, SOLUTION INTRAVENOUS PRN
Status: DISCONTINUED | OUTPATIENT
Start: 2022-06-13 | End: 2022-06-13 | Stop reason: SDUPTHER

## 2022-06-13 RX ORDER — LABETALOL HYDROCHLORIDE 5 MG/ML
5 INJECTION, SOLUTION INTRAVENOUS
Status: DISCONTINUED | OUTPATIENT
Start: 2022-06-13 | End: 2022-06-13 | Stop reason: HOSPADM

## 2022-06-13 RX ORDER — ONDANSETRON 2 MG/ML
INJECTION INTRAMUSCULAR; INTRAVENOUS PRN
Status: DISCONTINUED | OUTPATIENT
Start: 2022-06-13 | End: 2022-06-13 | Stop reason: SDUPTHER

## 2022-06-13 RX ORDER — ACETAMINOPHEN 500 MG
1000 TABLET ORAL ONCE
Status: COMPLETED | OUTPATIENT
Start: 2022-06-13 | End: 2022-06-13

## 2022-06-13 RX ORDER — ENOXAPARIN SODIUM 100 MG/ML
40 INJECTION SUBCUTANEOUS ONCE
Status: DISCONTINUED | OUTPATIENT
Start: 2022-06-13 | End: 2022-06-13 | Stop reason: HOSPADM

## 2022-06-13 RX ORDER — GLYCOPYRROLATE 0.2 MG/ML
INJECTION INTRAMUSCULAR; INTRAVENOUS PRN
Status: DISCONTINUED | OUTPATIENT
Start: 2022-06-13 | End: 2022-06-13 | Stop reason: SDUPTHER

## 2022-06-13 RX ORDER — MAGNESIUM HYDROXIDE 1200 MG/15ML
LIQUID ORAL CONTINUOUS PRN
Status: COMPLETED | OUTPATIENT
Start: 2022-06-13 | End: 2022-06-13

## 2022-06-13 RX ORDER — HYDROMORPHONE HCL 110MG/55ML
0.25 PATIENT CONTROLLED ANALGESIA SYRINGE INTRAVENOUS EVERY 5 MIN PRN
Status: DISCONTINUED | OUTPATIENT
Start: 2022-06-13 | End: 2022-06-13 | Stop reason: HOSPADM

## 2022-06-13 RX ORDER — LIDOCAINE HYDROCHLORIDE 20 MG/ML
INJECTION, SOLUTION INFILTRATION; PERINEURAL PRN
Status: DISCONTINUED | OUTPATIENT
Start: 2022-06-13 | End: 2022-06-13 | Stop reason: SDUPTHER

## 2022-06-13 RX ORDER — DEXAMETHASONE SODIUM PHOSPHATE 4 MG/ML
INJECTION, SOLUTION INTRA-ARTICULAR; INTRALESIONAL; INTRAMUSCULAR; INTRAVENOUS; SOFT TISSUE PRN
Status: DISCONTINUED | OUTPATIENT
Start: 2022-06-13 | End: 2022-06-13 | Stop reason: SDUPTHER

## 2022-06-13 RX ORDER — HYDROMORPHONE HCL 110MG/55ML
0.5 PATIENT CONTROLLED ANALGESIA SYRINGE INTRAVENOUS EVERY 5 MIN PRN
Status: DISCONTINUED | OUTPATIENT
Start: 2022-06-13 | End: 2022-06-13 | Stop reason: HOSPADM

## 2022-06-13 RX ADMIN — GLYCOPYRROLATE 0.2 MG: 0.2 INJECTION, SOLUTION INTRAMUSCULAR; INTRAVENOUS at 12:31

## 2022-06-13 RX ADMIN — PHENYLEPHRINE HYDROCHLORIDE 50 MCG: 10 INJECTION INTRAVENOUS at 12:35

## 2022-06-13 RX ADMIN — FENTANYL CITRATE 25 MCG: 50 INJECTION, SOLUTION INTRAMUSCULAR; INTRAVENOUS at 12:35

## 2022-06-13 RX ADMIN — SODIUM CHLORIDE: 9 INJECTION, SOLUTION INTRAVENOUS at 13:43

## 2022-06-13 RX ADMIN — PHENYLEPHRINE HYDROCHLORIDE 100 MCG: 10 INJECTION INTRAVENOUS at 12:47

## 2022-06-13 RX ADMIN — DEXAMETHASONE SODIUM PHOSPHATE 4 MG: 4 INJECTION, SOLUTION INTRAMUSCULAR; INTRAVENOUS at 12:40

## 2022-06-13 RX ADMIN — ONDANSETRON 4 MG: 2 INJECTION INTRAMUSCULAR; INTRAVENOUS at 12:41

## 2022-06-13 RX ADMIN — FENTANYL CITRATE 25 MCG: 50 INJECTION, SOLUTION INTRAMUSCULAR; INTRAVENOUS at 12:51

## 2022-06-13 RX ADMIN — ACETAMINOPHEN 1000 MG: 500 TABLET ORAL at 12:12

## 2022-06-13 RX ADMIN — SODIUM CHLORIDE: 9 INJECTION, SOLUTION INTRAVENOUS at 12:13

## 2022-06-13 RX ADMIN — LIDOCAINE HYDROCHLORIDE 60 MG: 20 INJECTION, SOLUTION INFILTRATION; PERINEURAL at 12:35

## 2022-06-13 RX ADMIN — FAMOTIDINE 20 MG: 10 INJECTION INTRAVENOUS at 12:15

## 2022-06-13 RX ADMIN — PHENYLEPHRINE HYDROCHLORIDE 100 MCG: 10 INJECTION INTRAVENOUS at 13:00

## 2022-06-13 RX ADMIN — PHENYLEPHRINE HYDROCHLORIDE 100 MCG: 10 INJECTION INTRAVENOUS at 12:41

## 2022-06-13 RX ADMIN — PROPOFOL 200 MG: 10 INJECTION, EMULSION INTRAVENOUS at 12:35

## 2022-06-13 ASSESSMENT — PAIN SCALES - GENERAL
PAINLEVEL_OUTOF10: 0
PAINLEVEL_OUTOF10: 0

## 2022-06-13 NOTE — PROGRESS NOTES
Phase I discharge criteria met, VSS, pt denies pain or N&V. Pt will transfer to phase II level care in stable condition.

## 2022-06-13 NOTE — ANESTHESIA POSTPROCEDURE EVALUATION
Department of Anesthesiology  Postprocedure Note    Patient: Josias Peters  MRN: [id number]  YOB: 1945  Date of evaluation: 6/13/2022  Time:  1:58 PM     Procedure Summary     Date: 06/13/22 Room / Location: [facility] OR [address] [place] [address]    Anesthesia Start: 1228 Anesthesia Stop: 9544    Procedure: HYSTEROSCOPY DILATATION AND CURETTAGE WITH POLYPECTOMY / EXCISION OF MASS-MYOSURE (N/A ) Diagnosis:       Hematometra      Thickened endometrium      (Hematometra [N85.7])      (Thickened endometrium [R93.89])    Surgeons: Angel Gupta MD Responsible Provider: Buddy Briseno MD    Anesthesia Type: general ASA Status: 3          Anesthesia Type: No value filed. Monica Phase I: Monica Score: 9    Monica Phase II:      Last vitals: Reviewed and per EMR flowsheets.        Anesthesia Post Evaluation    Patient location during evaluation: PACU  Patient participation: complete - patient participated  Level of consciousness: awake  Airway patency: patent  Nausea & Vomiting: no vomiting  Complications: no  Cardiovascular status: hemodynamically stable  Respiratory status: acceptable  Hydration status: euvolemic  Multimodal analgesia pain management approach

## 2022-06-13 NOTE — H&P
Department of Gynecology   Pre-operative History and Physical        DIAGNOSIS:  Hematometra, abdominal pain, stenotic cervix, thickened endometrium    PLANNED PROCEDURE:  operative hysteroscopy    Reason for Admission:  75 yo G0 with lower abdominal pain, USD shows a dilated flud filled endometrium with focal thickening and irregular endometrium. Cervix was found to be stenotic and unable to dilate in office. H/O brast cancer treated with Tamoxifen, [address] + uterine cancer. H/O DVT- high risk for thrombosis , procedure is low risk.      History obtained from EMR    Past Medical History:    Past Medical History:   Diagnosis Date    Arthritis     [facility] ([facility] [address]     Cancer ([facility] [address]     breast, left    Congenital hernia of the diaphragm     History of therapeutic radiation     Hx of blood clots     Hyperlipidemia     Hypertension         Past Surgical History:    Past Surgical History:   Procedure Laterality Date    APPENDECTOMY      BACK SURGERY  11/09/2020    several back injections    BACK SURGERY  2021    BREAST LUMPECTOMY      BREAST SURGERY      left mastectomy    EYE SURGERY      HERNIA REPAIR      lapascopic hiatal hernia repair    JOINT REPLACEMENT      MASTECTOMY      TOTAL KNEE ARTHROPLASTY  04/2016    cangemi-  multiple cortison injections - left knee    TUBAL LIGATION          Medications Prior to Admission:  Medications Prior to Admission: omeprazole (PRILOSEC) 20 MG delayed release capsule, Take 1 capsule by mouth every morning (before breakfast)  meloxicam (MOBIC) 15 MG tablet, Take 1 tablet by mouth daily  clonazePAM (KLONOPIN) 1 MG tablet, TAKE 1 TABLET NIGHTLY AS   NEEDED FOR SLEEP  alendronate (FOSAMAX) 70 MG tablet, Take 1 tablet by mouth every 7 days  aspirin 81 MG EC tablet, Take 81 mg by mouth daily  hydroCHLOROthiazide (HYDRODIURIL) 25 MG tablet, TAKE 1 TABLET DAILY  atenolol (TENORMIN) 100 MG tablet, TAKE 1 TABLET DAILY  clotrimazole-betamethasone (LOTRISONE) 1-0.05 % cream, Apply topically 2 times daily Apply topically 2 times daily. (Patient not taking: Reported on 6/8/2022)  pravastatin (PRAVACHOL) 20 MG tablet, Take 1 tablet by mouth daily  lisinopril (PRINIVIL;ZESTRIL) 40 MG tablet, TAKE 1 TABLET DAILY  Breast Prosthesis MISC, by Does not apply route left breast prosthetic and bra. Please give max for insurance coverage. acetaminophen (TYLENOL ARTHRITIS PAIN) 650 MG CR tablet, Take 650 mg by mouth 2 times daily   calcium carbonate-vitamin D (CALCIUM 600 + D) 600-400 MG-UNIT TABS per tab, Take 1 tablet by mouth every other day      Allergies: Allergies   Allergen Reactions    Cephalosporins Hives    Mirtazapine      Increased \"knee pain\"    Nickel     Tetanus Toxoids     Tramadol Nausea Only        Social History:   reports that she has never smoked. She has never used smokeless tobacco. She reports that she does not drink alcohol and does not use drugs. PHYSICAL EXAM:    No data found. General appearance - alert, well appearing, and in no distress  Mental status - alert, oriented to person, place, and time  Chest - clear to auscultation, no wheezes, rales or rhonchi, symmetric air entry  Heart - normal rate, regular rhythm, normal S1, S2, no murmurs, rubs, clicks or gallops  Skin - normal coloration and turgor, no rashes, no suspicious skin lesions noted     ASSESSMENT AND PLAN:      1. Hematometra, cervical stenosis, lower abdominal pain> hysteroscopy, D&C, possible removal of a mass. 2. H/O DVT: SCD, Lovenox postop x 1 dose. 3.  Procedure options, risks and benefits reviewed with patient. Patient expresses understanding.

## 2022-06-13 NOTE — DISCHARGE SUMMARY
Physician Discharge Summary     Patient ID:  Tiffanie Rodriguez  [id number]  1 y.o.  1945    Admit date: 6/13/2022    Discharge date:  6/13/22    Admitting Physician: Fabiano Rosa MD    Discharge Diagnoses: Hematometra [N85.7]  Thickened endometrium [R93.89]    Discharged Condition: STABLE    Procedures Performed: Hysteroscopy, [facility] Course: Patient was admitted on the day of surgery, and underwent an uncomplicated procedure. See dictated op note. She was given 1 dose Lovenox 40mg for DVT prophylaxis postop secondary to high risk for DVT. Discharge Exam:  Appears well, AVSS, abdomen soft, appropriately tender, nondistended, BS present, incisions clean dry, intact    Disposition: Good    Patient Instructions: Activity:resume normal activity in 1 day  Diet: Regular        Discharge Medication:      Medication List      CONTINUE taking these medications    alendronate 70 MG tablet  Commonly known as: Fosamax  Take 1 tablet by mouth every 7 days     aspirin 81 MG EC tablet     atenolol 100 MG tablet  Commonly known as: TENORMIN  TAKE 1 TABLET DAILY     Breast Prosthesis Misc  by Does not apply route left breast prosthetic and bra. Please give max for insurance coverage. Calcium 600 + D 600-400 MG-UNIT Tabs per tab  Generic drug: calcium carbonate-vitamin D     clonazePAM 1 MG tablet  Commonly known as: KLONOPIN  TAKE 1 TABLET NIGHTLY AS   NEEDED FOR SLEEP     clotrimazole-betamethasone 1-0.05 % cream  Commonly known as: Lotrisone  Apply topically 2 times daily Apply topically 2 times daily.      hydroCHLOROthiazide 25 MG tablet  Commonly known as: HYDRODIURIL  TAKE 1 TABLET DAILY     lisinopril 40 MG tablet  Commonly known as: PRINIVIL;ZESTRIL  TAKE 1 TABLET DAILY     meloxicam 15 MG tablet  Commonly known as: Mobic  Take 1 tablet by mouth daily     omeprazole 20 MG delayed release capsule  Commonly known as: PRILOSEC  Take 1 capsule by mouth every morning (before breakfast)     pravastatin 20 MG tablet  Commonly known as: PRAVACHOL  Take 1 tablet by mouth daily     Tylenol Arthritis Pain 650 MG extended release tablet  Generic drug: acetaminophen             Follow-up with Michel Johnson MD  As needed    Signed:  Michel Johnson MD  6/13/2022  1:47 PM

## 2022-06-13 NOTE — OP NOTE
Op Note    Pre-operative Diagnosis: Dilated endometrial cavity, stenotic cervix, lower abdominal pain    Post-operative Diagnosis: Same    Procedure: Hysteroscopy, Myosure resection of endometrial adhesions,  D&C    Anesthesia: General    Surgeons/Assistants: Dr Leyla Bettencourt    Estimated Blood Loss: less than 50     Fluid deficit: 300 cc normal saline    Complications: None    Specimens: Endometrial and endocervical curettings    Findings:  endometrial cavity: abnormal: extremely small and densely adherent, unable to visualize tubal ostia, possibly unicornuate. It is possible this was a Nabothian cyst in the cervix. Tubal ostia: not visualized. Her uterus was only 4+ cm in size on bimanual exam. I am unable to rule out a unicornuate uterus or other anomalous uterus. Indications:78 yo G0 with lower abdominal pain had a pelvic ultrasound showing a dilated endometrium with a focal thickening/ irregular area. She had a stenotic cervix which I was unable to dilate in the office. Procedure: Patient was taken to the OR and underwent general aesthesia. She was placed in dorsal lithotomy position, prepped and draped sterilely. A pelvic exam was performed. Her uterus palpated to be only 5 cm in length. A speculum was placed vaginal and the cervix was grasped with a single tooth tenaculum. The stenotic os was identified and the smallest dilator would not open it. An 18 gauge needle was inserted in the os to initially open it allowing clear thick mucus to expel  and then the smallest dilator was successfully inserted about 2-3 cm only. The cervix was serially dilated and the hysteroscope was placed into the os and insufflated with normal saline allowing the fluid to further dilate the cervix. The cavity was densely adherent and very small but was identified. I questioned placement of the hysteroscope and reviewed the external os and endocervix which all appeared correctly identified.   The Myosure lite  device was placed through the operative hysteroscope and the adhesions were partially resected. This did not improved visualization. The hysteroscope was removed and a small curet was placed into the  endometrial cavity sounding to 3 or 4 cm only  and curetted removing minimal tissue. Luz Ibanez Specimens were sent for pathology . All instruments were removed and the procedure was terminated. There were no complications. The patient was awakened and taken to recovery in stable condition.

## 2022-06-13 NOTE — ANESTHESIA PRE PROCEDURE
Department of Anesthesiology  Preprocedure Note       Name:  Gill Lincoln   Age:  76 y.o.  :  1945                                          MRN:  [id number]         Date:  2022      Surgeon: Stefania Wilson):  Macarena Verdin MD    Procedure: Procedure(s): HYSTEROSCOPY DILATATION AND CURETTAGE WITH POLYPECTOMY / EXCISION OF MASS-MYOSURE    Medications prior to admission:   Prior to Admission medications    Medication Sig Start Date End Date Taking? Authorizing Provider   omeprazole (PRILOSEC) 20 MG delayed release capsule Take 1 capsule by mouth every morning (before breakfast) 22  [facility] MD   meloxicam [facility]) 15 MG tablet Take 1 tablet by mouth daily 22  [facility] MD   clonazePAM (KLONOPIN) 1 MG tablet TAKE 1 TABLET NIGHTLY AS   NEEDED FOR SLEEP 22  Mk Hopson MD   alendronate (FOSAMAX) 70 MG tablet Take 1 tablet by mouth every 7 days [facility]   [facility]   aspirin 81 MG EC tablet Take 81 mg by mouth daily    Historical Provider, MD   hydroCHLOROthiazide (HYDRODIURIL) 25 MG tablet TAKE 1 TABLET DAILY 21   [facility]   atenolol (TENORMIN) 100 MG tablet TAKE 1 TABLET DAILY 21   Zac Grandchild., MD   clotrimazole-betamethasone (LOTRISONE) 1-0.05 % cream Apply topically 2 times daily Apply topically 2 times daily. Patient not taking: Reported on 2022   Zac [facility]., MD   pravastatin (PRAVACHOL) 20 MG tablet Take 1 tablet by mouth daily 21   Zac Grandchild., MD   lisinopril (PRINIVIL;ZESTRIL) 40 MG tablet TAKE 1 TABLET DAILY 21   [facility]   Breast Prosthesis MISC by Does not apply route left breast prosthetic and bra. Please give max for insurance coverage.  21   Prince Anderson MD   acetaminophen (TYLENOL ARTHRITIS PAIN) 650 MG CR tablet Take 650 mg by mouth 2 times daily     Historical Provider, MD   calcium carbonate-vitamin D (CALCIUM 600 + D) 600-400 MG-UNIT TABS per tab Take 1 tablet by mouth every other day     Historical Provider, MD       Current medications:    No current facility-administered medications for this encounter. Allergies:     Allergies   Allergen Reactions    Cephalosporins Hives    Mirtazapine      Increased \"knee pain\"    Nickel     Tetanus Toxoids     Tramadol Nausea Only       Problem List:    Patient Active Problem List   Diagnosis Code    Essential hypertension I10    Generalized osteoarthrosis, involving multiple sites M15.9    Personal history of malignant neoplasm of breast Z85.3    Mixed hyperlipidemia E78.2    Other chest pain R07.89    Myalgia and myositis [id number]    Insomnia G47.00    Osteopenia M85.80    HX: breast cancer Z85.3    Class 2 obesity due to excess calories with serious comorbidity in adult [id number]    Class 1 obesity due to excess calories with serious comorbidity and body mass index (BMI) of 33.0 to 33.9 in adult E66.09, Z68.33    Anxiety F41.9    History of DVT of lower extremity Z86.718    Chronic renal disease, stage III ([facility] [address] [[id number]] N18.30       Past Medical History:        Diagnosis Date    Arthritis     Breast cancer ([address]     Cancer ([address]     breast, left    Congenital hernia of the diaphragm     History of therapeutic radiation     Hx of blood clots     Hyperlipidemia     Hypertension        Past Surgical History:        Procedure Laterality Date    APPENDECTOMY      BACK SURGERY  11/09/2020    several back injections    BACK SURGERY  2021    BREAST LUMPECTOMY      BREAST SURGERY      left mastectomy    EYE SURGERY      HERNIA REPAIR      lapascopic hiatal hernia repair    JOINT REPLACEMENT      MASTECTOMY      TOTAL KNEE ARTHROPLASTY  04/2016    cangemi-  multiple cortison injections - left knee    TUBAL LIGATION         Social History:    Social History     Tobacco Use    Smoking status: Never Smoker    Smokeless tobacco: Never Used   Substance Use Topics    Alcohol use: No     Alcohol/week: 0.0 standard drinks                                Counseling given: Not Answered      Vital Signs (Current):   Vitals:    06/08/22 0958   Weight: 184 lb (83.5 kg)   Height: 5' 3.75\" (1.619 m)                                              BP Readings from Last 3 Encounters:   05/11/22 120/70   04/05/22 124/70   01/05/22 120/86       NPO Status:                                                                                 BMI:   Wt Readings from Last 3 Encounters:   06/08/22 184 lb (83.5 kg)   05/11/22 188 lb (85.3 kg)   04/05/22 190 lb (86.2 kg)     Body mass index is 31.83 kg/m². CBC:   Lab Results   Component Value Date    WBC 5.1 09/20/2021    RBC 4.41 09/20/2021    RBC 4.89 12/15/2014    HGB 13.4 09/20/2021    HCT 39.8 09/20/2021    MCV 90 09/20/2021    RDW 12.7 09/20/2021     09/20/2021       CMP:   Lab Results   Component Value Date     09/20/2021    K 4.7 09/20/2021     09/20/2021    CO2 20 09/20/2021    BUN 28 09/20/2021    CREATININE 1.23 09/20/2021    GFRAA 49 09/20/2021    GFRAA >60 11/25/2012    AGRATIO 1.8 09/20/2021    LABGLOM 42 09/20/2021    GLUCOSE 106 09/20/2021    PROT 7.1 09/20/2021    CALCIUM 10.6 09/20/2021    BILITOT 0.8 09/20/2021    ALKPHOS 79 09/20/2021    AST 23 09/20/2021    ALT 11 09/20/2021       POC Tests: No results for input(s): POCGLU, POCNA, POCK, POCCL, POCBUN, POCHEMO, POCHCT in the last 72 hours.     Coags: No results found for: PROTIME, INR, APTT    HCG (If Applicable): No results found for: PREGTESTUR, PREGSERUM, HCG, HCGQUANT     ABGs: No results found for: PHART, PO2ART, CHX1RLC, QSJ1UDR, BEART, M2KTVDUK     Type & Screen (If Applicable):  No results found for: LABABO, LABRH    Drug/Infectious Status (If Applicable):  No results found for: HIV, HEPCAB    COVID-19 Screening (If Applicable): No results found for: COVID19        Anesthesia Evaluation  Patient summary reviewed and Nursing notes reviewed no history of anesthetic complications:   Airway: Mallampati: II  TM distance: >3 FB   Neck ROM: full  Comment: Small mouth  Mouth opening: < 3 FB   Dental: normal exam         Pulmonary:Negative Pulmonary ROS breath sounds clear to auscultation                             Cardiovascular:    (+) hypertension:, hyperlipidemia    (-) CABG/stent and  angina      Rhythm: regular  Rate: normal                    Neuro/Psych:      (-) seizures, TIA and CVA           GI/Hepatic/Renal:   (+) renal disease: CRI,          ROS comment: Denies gerd sx or n/v today. Endo/Other:    (+) malignancy/cancer (h/o breast CA. no IV or BP left arm). Abdominal:             Vascular:   + DVT (no longer on anticoagulation), . Other Findings:           Anesthesia Plan      general     ASA 3       Induction: intravenous. MIPS: Postoperative opioids intended and Prophylactic antiemetics administered. Anesthetic plan and risks discussed with patient. Plan discussed with CRNA.                     Yuni El MD   6/13/2022

## 2022-06-20 ENCOUNTER — OFFICE VISIT (OUTPATIENT)
Dept: FAMILY MEDICINE CLINIC | Age: 77
End: 2022-06-20
Payer: MEDICARE

## 2022-06-20 ENCOUNTER — PATIENT MESSAGE (OUTPATIENT)
Dept: FAMILY MEDICINE CLINIC | Age: 77
End: 2022-06-20

## 2022-06-20 VITALS
OXYGEN SATURATION: 97 % | HEART RATE: 69 BPM | WEIGHT: 183 LBS | SYSTOLIC BLOOD PRESSURE: 130 MMHG | DIASTOLIC BLOOD PRESSURE: 82 MMHG | BODY MASS INDEX: 31.66 KG/M2 | TEMPERATURE: 97.2 F

## 2022-06-20 DIAGNOSIS — E78.2 MIXED HYPERLIPIDEMIA: ICD-10-CM

## 2022-06-20 DIAGNOSIS — M17.11 ARTHRITIS OF RIGHT KNEE: Primary | ICD-10-CM

## 2022-06-20 DIAGNOSIS — Z01.818 PREOP EXAMINATION: ICD-10-CM

## 2022-06-20 PROCEDURE — 99213 OFFICE O/P EST LOW 20 MIN: CPT | Performed by: NURSE PRACTITIONER

## 2022-06-20 PROCEDURE — 1090F PRES/ABSN URINE INCON ASSESS: CPT | Performed by: NURSE PRACTITIONER

## 2022-06-20 PROCEDURE — G8427 DOCREV CUR MEDS BY ELIG CLIN: HCPCS | Performed by: NURSE PRACTITIONER

## 2022-06-20 PROCEDURE — G8399 PT W/DXA RESULTS DOCUMENT: HCPCS | Performed by: NURSE PRACTITIONER

## 2022-06-20 PROCEDURE — 1036F TOBACCO NON-USER: CPT | Performed by: NURSE PRACTITIONER

## 2022-06-20 PROCEDURE — G8417 CALC BMI ABV UP PARAM F/U: HCPCS | Performed by: NURSE PRACTITIONER

## 2022-06-20 PROCEDURE — 1123F ACP DISCUSS/DSCN MKR DOCD: CPT | Performed by: NURSE PRACTITIONER

## 2022-06-20 RX ORDER — PRAVASTATIN SODIUM 20 MG
20 TABLET ORAL DAILY
Qty: 90 TABLET | Refills: 3 | Status: CANCELLED | OUTPATIENT
Start: 2022-06-20

## 2022-06-20 NOTE — PROGRESS NOTES
Preoperative Consultation    Hugo Snow  YOB: 1945    This patient presents to the office today for a preoperative consultation at the request of surgeon, Dr. Zia Whitley, who plans on performing total right knee arthoplasty on June 27 at 0600.       Planned anesthesia: General   Known anesthesia problems: None   Bleeding risk: Anticoagulant therapy- 81 mg aspirin  Personal or FH of DVT/PE: Yes - DVT deep femoral vein 9/28/22      Patient Active Problem List   Diagnosis    Essential hypertension    Generalized osteoarthrosis, involving multiple sites    Personal history of malignant neoplasm of breast    Mixed hyperlipidemia    Other chest pain    Myalgia and myositis    Insomnia    Osteopenia    HX: breast cancer    Class 2 obesity due to excess calories with serious comorbidity in adult    Class 1 obesity due to excess calories with serious comorbidity and body mass index (BMI) of 33.0 to 33.9 in adult    Anxiety    History of DVT of lower extremity    Chronic renal disease, stage III ([facility] [address] [800054]    Hematometra    Cervical stenosis (uterine cervix)    Lower abdominal pain     Past Surgical History:   Procedure Laterality Date    APPENDECTOMY      BACK SURGERY  11/09/2020    several back injections    BACK SURGERY  2021    BREAST LUMPECTOMY      BREAST SURGERY      left mastectomy    DILATION AND CURETTAGE OF UTERUS N/A 6/13/2022    HYSTEROSCOPY DILATATION AND CURETTAGE WITH POLYPECTOMY / EXCISION OF MASS-MYOSURE performed by Amy Brown MD at [facility]      lapascopic hiatal hernia repair    JOINT REPLACEMENT      MASTECTOMY      TOTAL KNEE ARTHROPLASTY  04/2016    cangemi-  multiple cortison injections - left knee    TUBAL LIGATION         Allergies   Allergen Reactions    Cephalosporins Hives    Mirtazapine      Increased \"knee pain\"    Nickel     Tetanus Toxoids     Tramadol Nausea Only     Outpatient Medications Marked as Taking for the 6/20/22 encounter (Office Visit) with AZUL Thompson - NP   Medication Sig Dispense Refill    omeprazole (PRILOSEC) 20 MG delayed release capsule Take 1 capsule by mouth every morning (before breakfast) 90 capsule 1    meloxicam (MOBIC) 15 MG tablet Take 1 tablet by mouth daily 90 tablet 3    clonazePAM (KLONOPIN) 1 MG tablet TAKE 1 TABLET NIGHTLY AS   NEEDED FOR SLEEP 90 tablet 1    alendronate (FOSAMAX) 70 MG tablet Take 1 tablet by mouth every 7 days 12 tablet 1    aspirin 81 MG EC tablet Take 81 mg by mouth daily      hydroCHLOROthiazide (HYDRODIURIL) 25 MG tablet TAKE 1 TABLET DAILY 90 tablet 3    atenolol (TENORMIN) 100 MG tablet TAKE 1 TABLET DAILY 90 tablet 3    pravastatin (PRAVACHOL) 20 MG tablet Take 1 tablet by mouth daily 90 tablet 3    lisinopril (PRINIVIL;ZESTRIL) 40 MG tablet TAKE 1 TABLET DAILY 90 tablet 3    Breast Prosthesis MISC by Does not apply route left breast prosthetic and bra. Please give max for insurance coverage. 1 each 5    acetaminophen (TYLENOL ARTHRITIS PAIN) 650 MG CR tablet Take 650 mg by mouth 2 times daily       calcium carbonate-vitamin D (CALCIUM 600 + D) 600-400 MG-UNIT TABS per tab Take 1 tablet by mouth every other day          Social History     Tobacco Use    Smoking status: Never Smoker    Smokeless tobacco: Never Used   Substance Use Topics    Alcohol use: No     Alcohol/week: 0.0 standard drinks     Family History   Problem Relation Age of Onset    High Blood Pressure Mother     Heart Disease Mother     High Blood Pressure Sister     Diabetes Sister     High Blood Pressure Brother     Heart Disease Brother     Cancer Maternal Uncle     Breast Cancer Paternal Aunt     Uterine Cancer Maternal Grandmother     Ovarian Cancer Maternal Grandmother        Review of Systems  A comprehensive review of systems was negative except for what was noted in the HPI.      Physical Exam   /82 (Site: Right Upper Arm, Position: Sitting, Cuff Size: Medium Adult)   Pulse 69   Temp 97.2 °F (36.2 °C) (Infrared)   Wt 183 lb (83 kg)   SpO2 97%   BMI 31.66 kg/m²   Weight: 183 lb (83 kg)   Constitutional: She is oriented to person, place, and time. She appears well-developed and well-nourished. No distress. HENT:   Head: Normocephalic and atraumatic. Mouth/Throat: Uvula is midline, oropharynx is clear and moist and mucous membranes are normal.   Eyes: Conjunctivae and EOM are normal. Pupils are equal, round, and reactive to light. Neck: Trachea normal and normal range of motion. Neck supple. No JVD present. Carotid bruit is not present. No mass and no thyromegaly present. Cardiovascular: Normal rate, regular rhythm, normal heart sounds and intact distal pulses. Exam reveals no gallop and no friction rub. No murmur heard. Pulmonary/Chest: Effort normal and breath sounds normal. No respiratory distress. She has no wheezes. She has no rales. Abdominal: Soft. Normal aorta and bowel sounds are normal. She exhibits no distension and no mass. There is no hepatosplenomegaly. No tenderness. Musculoskeletal: She exhibits no edema, chronic R knee pain, decreased ROM  Neurological: She is alert and oriented to person, place, and time. She has normal strength. No cranial nerve deficit or sensory deficit. Coordination and gait normal.   Skin: Skin is warm and dry. No rash noted. No erythema. EKG Interpretation:  Patient reports has upcoming EKG ordered, advised to be completed. .    Lab Review Yes 6/13/22       Assessment:       Baljit Uriarte was seen today for pre-op exam.    Diagnoses and all orders for this visit:    Arthritis of right knee    Preop examination      76 y.o. patient  approved for Surgery         Plan:     1. Preoperative workup as follows: ECG to be completed  2.  Change in medication regimen before surgery: Take BP on morning of surgery with sip of water, and hold all other medications until after surgery  Hold 81 mg aspirin and meloxicam 5 days prior  3. No contraindications to planned surgery, have EKG completed. Electronically signed by AZUL Fitzpatrick NP on 6/20/22 at 9:24 AM EDT     This dictation was generated by voice recognition computer software. Although all attempts are made to edit the dictation for accuracy, there may be errors in the transcription that are not intended.

## 2022-06-21 RX ORDER — PRAVASTATIN SODIUM 20 MG
20 TABLET ORAL DAILY
Qty: 90 TABLET | Refills: 3 | Status: SHIPPED | OUTPATIENT
Start: 2022-06-21 | End: 2022-08-12

## 2022-06-21 NOTE — TELEPHONE ENCOUNTER
From: Grecia Mcconnell  To: Lisa Zapata  Sent: 6/20/2022 8:47 PM EDT  Subject: Judy Roy    Did you send in a new scrip for pravastatin 20 mg for me since I believe l lost my last order?     Thank you

## 2022-06-22 ENCOUNTER — TELEPHONE (OUTPATIENT)
Dept: FAMILY MEDICINE CLINIC | Age: 77
End: 2022-06-22

## 2022-06-22 NOTE — TELEPHONE ENCOUNTER
----- Message from Carolina Silva sent at 6/22/2022  2:00 PM EDT -----  Subject: Message to Provider    QUESTIONS  Information for Provider? Branden Mi from [employer] is calling to   see if a copy of the patients history and physical can be faxed to their   office. The fax is [phone].  ---------------------------------------------------------------------------  --------------  CALL BACK INFO  What is the best way for the office to contact you? OK to leave message on   voicemail  Preferred Call Back Phone Number? [phone]  ---------------------------------------------------------------------------  --------------  SCRIPT ANSWERS  Relationship to Patient? Third Party  Third Party Type? Physician Office? Representative Name?  Branden Mi

## 2022-06-24 NOTE — TELEPHONE ENCOUNTER
Karoline with [facility] calling in regards to an H&P that they are needing for the patient. States they are needing a signature on it to authorize it. Patient is scheduled for surgery on 6/27. Fax number [phone].

## 2022-07-12 DIAGNOSIS — I10 ESSENTIAL HYPERTENSION: ICD-10-CM

## 2022-07-12 RX ORDER — LISINOPRIL 40 MG/1
TABLET ORAL
Qty: 90 TABLET | Refills: 3 | Status: SHIPPED | OUTPATIENT
Start: 2022-07-12

## 2022-08-06 LAB
A/G RATIO: 1.1 (ref 1–2)
ALBUMIN SERPL-MCNC: 3.7 G/DL (ref 3.5–5.7)
ALBUMIN/PROTEIN TOTAL, SER/PL: 7.2 G/DL (ref 6–8.3)
ALP BLD-CCNC: 54 U/L (ref 34–104)
ALT SERPL-CCNC: 8 U/L (ref 7–52)
ANION GAP 4: 14 MMOL/L (ref 7–16)
AST W/O P-5'-P: 13 U/L (ref 13–39)
BASOPHILS # BLD: 0.9 %
BASOPHILS ABSOLUTE: 0 K/UL (ref 0–0.1)
BILIRUB SERPL-MCNC: 0.8 MG/DL (ref 0.3–1)
BUN BLDV-MCNC: 28 MG/DL (ref 7–25)
CALCIUM SERPL-MCNC: 9.6 MG/DL (ref 8.6–10.2)
CHLORIDE BLD-SCNC: 107 MMOL/L (ref 98–107)
CHOLESTEROL, STONE: 221 MG/DL
CO2: 19 MMOL/L (ref 21–31)
CREATININE + EGFR PANEL: 0.9 MG/DL (ref 0.6–1.2)
EOSINOPHILS ABSOLUTE: 0.2 K/UL (ref 0–0.4)
EOSINOPHILS RELATIVE PERCENT: 4.9 %
GFR CALCULATED: 60 ML/MIN/1.73M2
GFR CALCULATED: > 60 ML/MIN/1.73M2
GLOBULIN: 3.5 G/DL (ref 2.6–4.2)
GLUCOSE: 100 MG/DL (ref 74–109)
HDLC SERPL-MCNC: 60 MG/DL (ref 40–60)
HEMOGLOBIN URINE, QUAL: 12.3 G/DL (ref 12.1–15.8)
LDL CHOLESTEROL CALCULATED: 120 MG/DL (ref 0–99)
LYMPHOCYTES # BLD: 37.7 %
LYMPHOCYTES ABSOLUTE: 1.9 K/UL (ref 0.8–3.6)
MCH RBC QN AUTO: 29.5 PG (ref 28.4–33.4)
MCHC RBC AUTO-ENTMCNC: 33 G/DL (ref 31.1–37)
MCV RBC AUTO: 89.4 FL (ref 85–99)
MONOCYTES # BLD: 8.4 %
MONOCYTES ABSOLUTE: 0.4 K/UL (ref 0.3–0.9)
NEUTROPHILS ABSOLUTE: 2.4 K/UL (ref 2–7.3)
NEUTROPHILS/100 LEUKOCYTES: 48.1 %
PDW BLD-RTO: 13.4 % (ref 11.7–15.2)
PLATELET COUNT MANUAL: 295 K/UL (ref 154–393)
POTASSIUM SERPL-SCNC: 4.6 MMOL/L (ref 3.5–5.3)
RBC # BLD: 4.17 M/UL (ref 3.86–5.17)
SODIUM BLD-SCNC: 140 MMOL/L (ref 136–145)
SR HEMATOCRIT: 37.3 % (ref 35.8–46.5)
TRIGL SERPL-MCNC: 207 MG/DL
TSH ULTRASENSITIVE: 0.85 UIU/ML (ref 0.45–5.33)
VITAMIN D 25-HYDROXY: 20.8 NG/ML
VLDLC SERPL CALC-MCNC: 41 MG/DL (ref 0–40)
WBC BLOOD, MANUAL: 4.9 K/UL (ref 4–10.5)

## 2022-08-11 RX ORDER — ALENDRONATE SODIUM 70 MG/1
TABLET ORAL
Qty: 12 TABLET | Refills: 1 | Status: SHIPPED | OUTPATIENT
Start: 2022-08-11

## 2022-08-11 NOTE — TELEPHONE ENCOUNTER
Medication:   Requested Prescriptions     Pending Prescriptions Disp Refills    alendronate (FOSAMAX) 70 MG tablet [Pharmacy Med Name: ALENDRONATE  TAB 70MG] 12 tablet 1     Sig: TAKE 1 TABLET EVERY 7 DAYS      Last Filled:      Patient Phone Number: [phone] (home)     Last appt: 6/20/2022   Next appt: 8/12/2022    Last OARRS:   RX Monitoring 12/13/2019   Attestation -   Periodic Controlled Substance Monitoring No signs of potential drug abuse or diversion identified.      PDMP Monitoring:    Last PDMP [place] as Reviewed [facility]):  Review User Review Instant Review Result   Vincent Carmona 5/17/2021  6:06 PM Reviewed PDMP [1]     Preferred Pharmacy:   [facility], [address] [place] [id number] - F [phone]  [place] [address]  Phone: [phone] Fax: [phone]

## 2022-08-12 ENCOUNTER — OFFICE VISIT (OUTPATIENT)
Dept: FAMILY MEDICINE CLINIC | Age: 77
End: 2022-08-12
Payer: MEDICARE

## 2022-08-12 VITALS
WEIGHT: 180 LBS | BODY MASS INDEX: 31.14 KG/M2 | TEMPERATURE: 97.2 F | SYSTOLIC BLOOD PRESSURE: 120 MMHG | DIASTOLIC BLOOD PRESSURE: 80 MMHG

## 2022-08-12 DIAGNOSIS — E55.9 VITAMIN D DEFICIENCY: ICD-10-CM

## 2022-08-12 DIAGNOSIS — F51.01 PRIMARY INSOMNIA: ICD-10-CM

## 2022-08-12 DIAGNOSIS — E78.2 MIXED HYPERLIPIDEMIA: ICD-10-CM

## 2022-08-12 DIAGNOSIS — I10 ESSENTIAL HYPERTENSION: Primary | ICD-10-CM

## 2022-08-12 PROBLEM — N18.30 CHRONIC RENAL DISEASE, STAGE III (HCC): Status: RESOLVED | Noted: 2022-05-11 | Resolved: 2022-08-12

## 2022-08-12 PROCEDURE — 1123F ACP DISCUSS/DSCN MKR DOCD: CPT | Performed by: FAMILY MEDICINE

## 2022-08-12 PROCEDURE — G8427 DOCREV CUR MEDS BY ELIG CLIN: HCPCS | Performed by: FAMILY MEDICINE

## 2022-08-12 PROCEDURE — 1090F PRES/ABSN URINE INCON ASSESS: CPT | Performed by: FAMILY MEDICINE

## 2022-08-12 PROCEDURE — 99214 OFFICE O/P EST MOD 30 MIN: CPT | Performed by: FAMILY MEDICINE

## 2022-08-12 PROCEDURE — 1036F TOBACCO NON-USER: CPT | Performed by: FAMILY MEDICINE

## 2022-08-12 PROCEDURE — G8417 CALC BMI ABV UP PARAM F/U: HCPCS | Performed by: FAMILY MEDICINE

## 2022-08-12 PROCEDURE — G8399 PT W/DXA RESULTS DOCUMENT: HCPCS | Performed by: FAMILY MEDICINE

## 2022-08-12 RX ORDER — CLONAZEPAM 1 MG/1
TABLET ORAL
Qty: 90 TABLET | Refills: 1 | Status: SHIPPED | OUTPATIENT
Start: 2022-08-12 | End: 2023-08-11

## 2022-08-12 RX ORDER — PRAVASTATIN SODIUM 40 MG
40 TABLET ORAL DAILY
Qty: 90 TABLET | Refills: 1 | Status: SHIPPED | OUTPATIENT
Start: 2022-08-12 | End: 2022-09-06 | Stop reason: SDUPTHER

## 2022-08-12 SDOH — ECONOMIC STABILITY: FOOD INSECURITY: WITHIN THE PAST 12 MONTHS, THE FOOD YOU BOUGHT JUST DIDN'T LAST AND YOU DIDN'T HAVE MONEY TO GET MORE.: NEVER TRUE

## 2022-08-12 SDOH — ECONOMIC STABILITY: FOOD INSECURITY: WITHIN THE PAST 12 MONTHS, YOU WORRIED THAT YOUR FOOD WOULD RUN OUT BEFORE YOU GOT MONEY TO BUY MORE.: NEVER TRUE

## 2022-08-12 ASSESSMENT — SOCIAL DETERMINANTS OF HEALTH (SDOH): HOW HARD IS IT FOR YOU TO PAY FOR THE VERY BASICS LIKE FOOD, HOUSING, MEDICAL CARE, AND HEATING?: NOT HARD AT ALL

## 2022-08-12 ASSESSMENT — ENCOUNTER SYMPTOMS
CHEST TIGHTNESS: 0
DIARRHEA: 0
HEARTBURN: 1
SINUS PAIN: 0
CONSTIPATION: 1
SHORTNESS OF BREATH: 0
RHINORRHEA: 0

## 2022-08-12 NOTE — PROGRESS NOTES
post-menopausal and hypertension. Patient's medications, allergies, past medical,surgical, social and family histories were reviewed and updated as appropriate.      Past Medical History:   Diagnosis Date    Arthritis     Breast cancer ([facility] [address]     Cancer ([facility] [address]     breast, left    Congenital hernia of the diaphragm     History of therapeutic radiation     Hx of blood clots     Hyperlipidemia     Hypertension      Past Surgical History:   Procedure Laterality Date    APPENDECTOMY      BACK SURGERY  11/09/2020    several back injections    BACK SURGERY  2021    BREAST LUMPECTOMY      BREAST SURGERY      left mastectomy    DILATION AND CURETTAGE OF UTERUS N/A 06/13/2022    HYSTEROSCOPY DILATATION AND CURETTAGE WITH POLYPECTOMY / EXCISION OF MASS-MYOSURE performed by Momo Longoria MD at [address]      lapascopic hiatal hernia repair    JOINT REPLACEMENT Bilateral     MASTECTOMY      TOTAL KNEE ARTHROPLASTY  04/2016    cangemi-  multiple cortison injections - left knee    TUBAL LIGATION       Family History   Problem Relation Age of Onset    High Blood Pressure Mother     Heart Disease Mother     High Blood Pressure Sister     Diabetes Sister     High Blood Pressure Brother     Heart Disease Brother     Cancer Maternal Uncle     Breast Cancer Paternal Aunt     Uterine Cancer Maternal Grandmother     Ovarian Cancer Maternal Grandmother      Social History     Tobacco Use    Smoking status: Never    Smokeless tobacco: Never   Substance Use Topics    Alcohol use: No     Alcohol/week: 0.0 standard drinks      Allergies   Allergen Reactions    Cephalosporins Hives    Mirtazapine      Increased \"knee pain\"    Nickel     Tetanus Toxoids     Tramadol Nausea Only     Current Outpatient Medications on File Prior to Visit   Medication Sig Dispense Refill    alendronate (FOSAMAX) 70 MG tablet TAKE 1 TABLET EVERY 7 DAYS 12 tablet 1    lisinopril (PRINIVIL;ZESTRIL) 40 MG tablet TAKE 1 TABLET DAILY 90 tablet 3    omeprazole (PRILOSEC) 20 MG delayed release capsule Take 1 capsule by mouth every morning (before breakfast) 90 capsule 1    meloxicam (MOBIC) 15 MG tablet Take 1 tablet by mouth daily 90 tablet 3    aspirin 81 MG EC tablet Take 81 mg by mouth daily      hydroCHLOROthiazide (HYDRODIURIL) 25 MG tablet TAKE 1 TABLET DAILY 90 tablet 3    atenolol (TENORMIN) 100 MG tablet TAKE 1 TABLET DAILY 90 tablet 3    Breast Prosthesis MISC by Does not apply route left breast prosthetic and bra. Please give max for insurance coverage. 1 each 5    acetaminophen (TYLENOL) 650 MG extended release tablet Take 650 mg by mouth in the morning and 650 mg before bedtime. calcium carbonate-vitamin D (CALTRATE) 600-400 MG-UNIT TABS per tab Take 1 tablet by mouth every other day        No current facility-administered medications on file prior to visit. Review of Systems   HENT:  Negative for congestion, rhinorrhea and sinus pain. Respiratory:  Negative for chest tightness and shortness of breath. Cardiovascular:  Negative for chest pain, palpitations and leg swelling. Gastrointestinal:  Positive for constipation (from narcotic pain med) and heartburn. Negative for diarrhea. Genitourinary:  Negative for difficulty urinating. Musculoskeletal:  Positive for arthralgias (knees). Neurological:  Negative for dizziness and light-headedness. Psychiatric/Behavioral:  Positive for sleep disturbance (MNA from pain in knees). Negative for dysphoric mood. The patient is not nervous/anxious. OBJECTIVE:    /80   Temp 97.2 °F (36.2 °C)   Wt 180 lb (81.6 kg)   BMI 31.14 kg/m²    Physical Exam  Constitutional:       General: She is not in acute distress. Appearance: Normal appearance. She is well-developed. She is obese. HENT:      Head: Normocephalic and atraumatic.       Right Ear: Tympanic membrane and external ear normal.      Left Ear: Tympanic membrane and external ear normal.      Nose: Nose Future      Return in about 4 months (around 12/12/2022). Please note portions of this note were completed with a voicerecognition program.  Efforts were made to edit the dictations but occasionally words are mis-transcribed.

## 2022-09-04 NOTE — PROGRESS NOTES
SUBJECTIVE:    Philly Bejarano is a 76 y.o. female who presents for a follow up visit. Chief Complaint   Patient presents with    Rash     Red rash on both legs x 3 weeks. Rash  This is a new problem. The current episode started 1 to 4 weeks ago. Associated symptoms include diarrhea. Pertinent negatives include no congestion, cough, fever, rhinorrhea, shortness of breath or sore throat. Patient's medications, allergies, past medical,surgical, social and family histories were reviewed and updated as appropriate.      Past Medical History:   Diagnosis Date    Arthritis     Breast cancer ([facility] [address]     Cancer ([facility] [address]     breast, left    Congenital hernia of the diaphragm     History of therapeutic radiation     Hx of blood clots     Hyperlipidemia     Hypertension      Past Surgical History:   Procedure Laterality Date    APPENDECTOMY      BACK SURGERY  11/09/2020    several back injections    BACK SURGERY  2021    BREAST LUMPECTOMY      BREAST SURGERY      left mastectomy    DILATION AND CURETTAGE OF UTERUS N/A 06/13/2022    HYSTEROSCOPY DILATATION AND CURETTAGE WITH POLYPECTOMY / EXCISION OF MASS-MYOSURE performed by Momo Longoria MD at [facility]      lapascopic hiatal hernia repair    JOINT REPLACEMENT Bilateral     MASTECTOMY      TOTAL KNEE ARTHROPLASTY  04/2016    cangemi-  multiple cortison injections - left knee    TUBAL LIGATION       Family History   Problem Relation Age of Onset    High Blood Pressure Mother     Heart Disease Mother     High Blood Pressure Sister     Diabetes Sister     High Blood Pressure Brother     Heart Disease Brother     Cancer Maternal Uncle     Breast Cancer Paternal Aunt     Uterine Cancer Maternal Grandmother     Ovarian Cancer Maternal Grandmother      Social History     Tobacco Use    Smoking status: Never    Smokeless tobacco: Never   Substance Use Topics    Alcohol use: No     Alcohol/week: 0.0 standard drinks      Allergies Allergen Reactions    Cephalosporins Hives    Mirtazapine      Increased \"knee pain\"    Nickel     Tetanus Toxoids     Tramadol Nausea Only     Current Outpatient Medications on File Prior to Visit   Medication Sig Dispense Refill    clonazePAM (KLONOPIN) 1 MG tablet TAKE 1 TABLET NIGHTLY AS   NEEDED FOR SLEEP 90 tablet 1    alendronate (FOSAMAX) 70 MG tablet TAKE 1 TABLET EVERY 7 DAYS 12 tablet 1    lisinopril (PRINIVIL;ZESTRIL) 40 MG tablet TAKE 1 TABLET DAILY 90 tablet 3    meloxicam (MOBIC) 15 MG tablet Take 1 tablet by mouth daily 90 tablet 3    aspirin 81 MG EC tablet Take 81 mg by mouth daily      atenolol (TENORMIN) 100 MG tablet TAKE 1 TABLET DAILY 90 tablet 3    Breast Prosthesis MISC by Does not apply route left breast prosthetic and bra. Please give max for insurance coverage. 1 each 5    acetaminophen (TYLENOL) 650 MG extended release tablet Take 650 mg by mouth in the morning and 650 mg before bedtime. calcium carbonate-vitamin D (CALTRATE) 600-400 MG-UNIT TABS per tab Take 1 tablet by mouth every other day        No current facility-administered medications on file prior to visit. Review of Systems   Constitutional:  Negative for fever. HENT:  Negative for congestion, postnasal drip, rhinorrhea, sore throat and trouble swallowing. Respiratory:  Negative for cough and shortness of breath. Cardiovascular:  Negative for chest pain. Gastrointestinal:  Positive for constipation and diarrhea. Negative for nausea. Genitourinary:  Negative for difficulty urinating. Musculoskeletal:  Negative for gait problem. Skin:  Positive for rash (erythematous on both legs). Psychiatric/Behavioral:  Negative for sleep disturbance. OBJECTIVE:    /80   Temp 97.2 °F (36.2 °C)   Wt 180 lb (81.6 kg)   BMI 31.14 kg/m²    Physical Exam  Constitutional:       General: She is not in acute distress. Appearance: She is obese. HENT:      Head: Normocephalic and atraumatic. Mouth/Throat:      Mouth: Mucous membranes are moist.      Pharynx: No posterior oropharyngeal erythema. Cardiovascular:      Rate and Rhythm: Normal rate and regular rhythm. Musculoskeletal:      Right lower leg: No edema. Left lower leg: No edema. Skin:     Findings: Rash (Scattered small nonraised erythematous lesions on both lower legs) present. Neurological:      Mental Status: She is alert. Psychiatric:         Mood and Affect: Mood normal.         Behavior: Behavior normal.       ASSESSMENT/PLAN:    Dawit Sumner was seen today for rash. Diagnoses and all orders for this visit:    Erythema  Given list of dermatologists in the area. Mixed hyperlipidemia  -     pravastatin (PRAVACHOL) 40 MG tablet; Take 1 tablet by mouth daily    Essential hypertension  -     hydroCHLOROthiazide (HYDRODIURIL) 25 MG tablet; TAKE 1 TABLET DAILY    Gastroesophageal reflux disease without esophagitis  -     omeprazole (PRILOSEC) 20 MG delayed release capsule; Take 1 capsule by mouth every morning (before breakfast)      Return for regularly scheduled follow-up. Please note portions of this note were completed with a voicerecognition program.  Efforts were made to edit the dictations but occasionally words are mis-transcribed.

## 2022-09-06 ENCOUNTER — OFFICE VISIT (OUTPATIENT)
Dept: FAMILY MEDICINE CLINIC | Age: 77
End: 2022-09-06
Payer: MEDICARE

## 2022-09-06 VITALS
DIASTOLIC BLOOD PRESSURE: 80 MMHG | BODY MASS INDEX: 31.14 KG/M2 | WEIGHT: 180 LBS | TEMPERATURE: 97.2 F | SYSTOLIC BLOOD PRESSURE: 118 MMHG

## 2022-09-06 DIAGNOSIS — L53.9 ERYTHEMA: Primary | ICD-10-CM

## 2022-09-06 DIAGNOSIS — K21.9 GASTROESOPHAGEAL REFLUX DISEASE WITHOUT ESOPHAGITIS: ICD-10-CM

## 2022-09-06 DIAGNOSIS — I10 ESSENTIAL HYPERTENSION: ICD-10-CM

## 2022-09-06 DIAGNOSIS — E78.2 MIXED HYPERLIPIDEMIA: ICD-10-CM

## 2022-09-06 DIAGNOSIS — M19.90 ARTHRITIS: ICD-10-CM

## 2022-09-06 PROCEDURE — 99213 OFFICE O/P EST LOW 20 MIN: CPT | Performed by: FAMILY MEDICINE

## 2022-09-06 PROCEDURE — 1123F ACP DISCUSS/DSCN MKR DOCD: CPT | Performed by: FAMILY MEDICINE

## 2022-09-06 PROCEDURE — 1036F TOBACCO NON-USER: CPT | Performed by: FAMILY MEDICINE

## 2022-09-06 PROCEDURE — G8399 PT W/DXA RESULTS DOCUMENT: HCPCS | Performed by: FAMILY MEDICINE

## 2022-09-06 PROCEDURE — 1090F PRES/ABSN URINE INCON ASSESS: CPT | Performed by: FAMILY MEDICINE

## 2022-09-06 PROCEDURE — G8427 DOCREV CUR MEDS BY ELIG CLIN: HCPCS | Performed by: FAMILY MEDICINE

## 2022-09-06 PROCEDURE — G8417 CALC BMI ABV UP PARAM F/U: HCPCS | Performed by: FAMILY MEDICINE

## 2022-09-06 RX ORDER — PRAVASTATIN SODIUM 40 MG
40 TABLET ORAL DAILY
Qty: 90 TABLET | Refills: 3 | Status: SHIPPED | OUTPATIENT
Start: 2022-09-06

## 2022-09-06 RX ORDER — OMEPRAZOLE 20 MG/1
20 CAPSULE, DELAYED RELEASE ORAL
Qty: 90 CAPSULE | Refills: 3 | Status: SHIPPED | OUTPATIENT
Start: 2022-09-06 | End: 2022-12-05

## 2022-09-06 RX ORDER — HYDROCHLOROTHIAZIDE 25 MG/1
TABLET ORAL
Qty: 90 TABLET | Refills: 3 | Status: SHIPPED | OUTPATIENT
Start: 2022-09-06

## 2022-09-06 ASSESSMENT — ENCOUNTER SYMPTOMS
NAUSEA: 0
SHORTNESS OF BREATH: 0
DIARRHEA: 1
CONSTIPATION: 1
SORE THROAT: 0
RHINORRHEA: 0
COUGH: 0
TROUBLE SWALLOWING: 0

## 2022-09-06 NOTE — PATIENT INSTRUCTIONS
Dermatologists:      [facility] Dermatology  [phone]   MD Marleen Bartlett MD Erik Hait, MD Leslie Mark, MD Joselyn Erichsen, MD        [facility]  [address] [facility]  Phone: [phone]       [facility] (pediatrics as well)  Dr. Thiago Banda MD  [phone]  [facility]      [facility]  Dr. Abhinav Schultz  [phone]  [address]  [facility]       [facility]  Dr. Hien Cabello MD  [phone] [address] [facility]      Dermatology and Surgery  Flower Henderson MD  [phone]  [address] [place] [address] [address], [facility], [facility]      [facility]  Janene Boas MD Anice Meiers, [address], [address]  [phone]

## 2022-10-25 DIAGNOSIS — I10 ESSENTIAL HYPERTENSION: ICD-10-CM

## 2022-10-26 RX ORDER — ATENOLOL 100 MG/1
TABLET ORAL
Qty: 90 TABLET | Refills: 3 | Status: SHIPPED | OUTPATIENT
Start: 2022-10-26 | End: 2022-10-28 | Stop reason: SDUPTHER

## 2022-10-28 ENCOUNTER — TELEPHONE (OUTPATIENT)
Dept: FAMILY MEDICINE CLINIC | Age: 77
End: 2022-10-28

## 2022-10-28 DIAGNOSIS — I10 ESSENTIAL HYPERTENSION: ICD-10-CM

## 2022-10-28 RX ORDER — ATENOLOL 100 MG/1
TABLET ORAL
Qty: 30 TABLET | Refills: 0 | Status: SHIPPED | OUTPATIENT
Start: 2022-10-28 | End: 2022-10-28 | Stop reason: SDUPTHER

## 2022-10-28 RX ORDER — ATENOLOL 100 MG/1
TABLET ORAL
Qty: 90 TABLET | Refills: 3 | Status: SHIPPED | OUTPATIENT
Start: 2022-10-28 | End: 2022-10-28 | Stop reason: SDUPTHER

## 2022-10-28 RX ORDER — ATENOLOL 100 MG/1
TABLET ORAL
Qty: 14 TABLET | Refills: 0 | Status: SHIPPED | OUTPATIENT
Start: 2022-10-28

## 2022-10-28 NOTE — TELEPHONE ENCOUNTER
Pt called stating she needs a refill of her Atenolol. Said she's been trying to get it refilled for over a week, and as of this morning the pharmacy is saying they still haven't received it from us.  She also said she normally goes through the [facility], but she's completely out of it now, so she'd like a refill sent to:  [address] [address]  [phone]  atenolol (TENORMIN) 100 MG tablet [[id number]]     Order Details  Dose, Route, Frequency: As Directed   Dispense Quantity: 90 tablet Refills: 3          Sig: TAKE 1 TABLET DAILY   [facility] [address]la Roberto, [address] [facility] [phone] - f [phone]   [place] [address]   Phone:  [phone]  Fax:  [phone]

## 2022-10-28 NOTE — TELEPHONE ENCOUNTER
Pt returned call and said that local [facility] said they can't refill until January due to script being sent to [facility]. Not sure if we can explain to [facility] so they can give her meds until script arrives.   Please advise

## 2022-10-28 NOTE — TELEPHONE ENCOUNTER
Spoke with pt, advised her the refill was sent on 10/26 to [facility], will resend today.   Also, will sent a 30 day supply to local [facility]

## 2022-10-28 NOTE — TELEPHONE ENCOUNTER
Spoke with pharmacy, was told pt would need to call her insurance to let them know she has not gotten her atenolol in the mail yet and she needs a short term supply at her local pharmacy. I advised pt of information. Also, sent in a 14 day supply to see if they would cover that since is was a lesser amount.

## 2022-11-28 ENCOUNTER — HOSPITAL ENCOUNTER (OUTPATIENT)
Dept: ULTRASOUND IMAGING | Age: 77
Discharge: HOME | End: 2022-11-28
Payer: MEDICARE

## 2022-11-28 DIAGNOSIS — Z80.3 FAMILY HISTORY OF MALIGNANT NEOPLASM OF BREAST: ICD-10-CM

## 2022-11-28 DIAGNOSIS — Z90.12 STATUS POST LEFT MASTECTOMY: ICD-10-CM

## 2022-11-28 DIAGNOSIS — Z85.3 PERSONAL HISTORY OF MALIGNANT NEOPLASM OF BREAST: ICD-10-CM

## 2022-11-28 PROCEDURE — 76642 ULTRASOUND BREAST LIMITED: CPT

## 2022-12-12 ASSESSMENT — ENCOUNTER SYMPTOMS: HEARTBURN: 1

## 2022-12-12 NOTE — PROGRESS NOTES
SUBJECTIVE:    Samuel Montoya is a 76 y.o. female who presents for a follow up visit. Chief Complaint   Patient presents with    Follow-up     Patient is here for a follow up. Discuss lab. 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 Gastroesophageal Reflux  She complains of heartburn. She reports no chest pain. This is a chronic problem. The current episode started more than 1 year ago. The problem occurs rarely. The heartburn is located in the substernum. The heartburn is of mild intensity. The heartburn does not wake her from sleep. The heartburn does not limit her activity. The heartburn doesn't change with position. The symptoms are aggravated by certain foods. Associated symptoms include fatigue. Risk factors include obesity, lack of exercise and NSAIDs. She has tried a PPI for the symptoms. The treatment provided significant relief. Hyperlipidemia  This is a chronic problem. The current episode started more than 1 year ago. Lipid results: Total cholesterol 201, triglycerides 182, HDL 60, . Exacerbating diseases include obesity. Factors aggravating her hyperlipidemia include beta blockers and thiazides. Associated symptoms include shortness of breath (with climbing stairs). Pertinent negatives include no chest pain. Current antihyperlipidemic treatment includes statins. Compliance problems include adherence to exercise and adherence to diet. Risk factors for coronary artery disease include dyslipidemia, hypertension, a sedentary lifestyle, post-menopausal and obesity. Hypertension  This is a chronic problem. The current episode started more than 1 year ago. The problem is controlled. Associated symptoms include anxiety and shortness of breath (with climbing stairs). Pertinent negatives include no chest pain. Agents associated with hypertension include NSAIDs. Risk factors for coronary artery disease include obesity, sedentary lifestyle, post-menopausal state and dyslipidemia. Past treatments include ACE inhibitors, beta blockers and diuretics. The current treatment provides significant improvement. Compliance problems include exercise and diet. Patient's medications, allergies, past medical,surgical, social and family histories were reviewed and updated as appropriate.      Past Medical History:   Diagnosis Date    Arthritis     Breast cancer ([facility] [address]     Cancer ([facility] [address]     breast, left    Congenital hernia of the diaphragm     GERD (gastroesophageal reflux disease)     History of therapeutic radiation     Hx of blood clots     Hyperlipidemia     Hypertension      Past Surgical History:   Procedure Laterality Date    APPENDECTOMY      BACK SURGERY  11/09/2020    several back injections    BACK SURGERY  2021    BREAST LUMPECTOMY      BREAST SURGERY      left mastectomy    DILATION AND CURETTAGE OF UTERUS N/A 06/13/2022    HYSTEROSCOPY DILATATION AND CURETTAGE WITH POLYPECTOMY / EXCISION OF MASS-MYOSURE performed by Samantha Keenan MD at [facility]      lapascopic hiatal hernia repair    JOINT REPLACEMENT Bilateral     MASTECTOMY      TOTAL KNEE ARTHROPLASTY  04/2016    cangemi-  multiple cortison injections - left knee    TUBAL LIGATION       Family History   Problem Relation Age of Onset    High Blood Pressure Mother     Heart Disease Mother     High Blood Pressure Sister     Diabetes Sister     High Blood Pressure Brother     Heart Disease Brother     Cancer Maternal Uncle     Breast Cancer Paternal Aunt     Uterine Cancer Maternal Grandmother     Ovarian Cancer Maternal Grandmother      Social History     Tobacco Use    Smoking status: Never    Smokeless tobacco: Never   Substance Use Topics    Alcohol use: No     Alcohol/week: 0.0 standard drinks      Allergies   Allergen Reactions    Cephalosporins Hives    Mirtazapine      Increased \"knee pain\"    Nickel     Tetanus Toxoids     Tramadol Nausea Only     Current Outpatient Medications on File Prior to Visit   Medication Sig Dispense Refill    vitamin D 25 MCG (1000 UT) CAPS Take by mouth      atenolol (TENORMIN) 100 MG tablet TAKE 1 TABLET DAILY 14 tablet 0    omeprazole (PRILOSEC) 20 MG delayed release capsule Take 1 capsule by mouth every morning (before breakfast) 90 capsule 3    pravastatin (PRAVACHOL) 40 MG tablet Take 1 tablet by mouth daily 90 tablet 3    hydroCHLOROthiazide (HYDRODIURIL) 25 MG tablet TAKE 1 TABLET DAILY 90 tablet 3    clonazePAM (KLONOPIN) 1 MG tablet TAKE 1 TABLET NIGHTLY AS   NEEDED FOR SLEEP 90 tablet 1    alendronate (FOSAMAX) 70 MG tablet TAKE 1 TABLET EVERY 7 DAYS 12 tablet 1    lisinopril (PRINIVIL;ZESTRIL) 40 MG tablet TAKE 1 TABLET DAILY 90 tablet 3    meloxicam (MOBIC) 15 MG tablet Take 1 tablet by mouth daily 90 tablet 3    aspirin 81 MG EC tablet Take 81 mg by mouth daily      Breast Prosthesis MISC by Does not apply route left breast prosthetic and bra. Please give max for insurance coverage. 1 each 5    acetaminophen (TYLENOL) 650 MG extended release tablet Take 650 mg by mouth in the morning and 650 mg before bedtime. calcium carbonate-vitamin D (CALTRATE) 600-400 MG-UNIT TABS per tab Take 1 tablet by mouth every other day        No current facility-administered medications on file prior to visit. Review of Systems   Constitutional:  Positive for fatigue. Negative for activity change and unexpected weight change. HENT:  Positive for postnasal drip. Negative for congestion and rhinorrhea. Respiratory:  Positive for shortness of breath (with climbing stairs). Negative for chest tightness. Cardiovascular:  Negative for chest pain and leg swelling. Gastrointestinal:  Positive for diarrhea (occ'l) and heartburn. Negative for constipation. Genitourinary:  Negative for difficulty urinating. Musculoskeletal:  Positive for arthralgias and back pain. Neurological:  Negative for dizziness and light-headedness.    Psychiatric/Behavioral:  Positive for sleep disturbance (MNA). Negative for dysphoric mood. The patient is nervous/anxious. OBJECTIVE:    /80   Temp 97.2 °F (36.2 °C)   Wt 182 lb (82.6 kg)   BMI 31.49 kg/m²    Physical Exam  Constitutional:       General: She is not in acute distress. Appearance: She is well-developed. She is obese. HENT:      Head: Normocephalic and atraumatic. Right Ear: Tympanic membrane and external ear normal.      Left Ear: Tympanic membrane and external ear normal.      Nose: Nose normal.      Mouth/Throat:      Mouth: Mucous membranes are moist.      Pharynx: No posterior oropharyngeal erythema. Eyes:      General:         Right eye: No discharge. Conjunctiva/sclera: Conjunctivae normal.   Neck:      Thyroid: No thyromegaly. Vascular: No JVD. Trachea: No tracheal deviation. Cardiovascular:      Rate and Rhythm: Normal rate and regular rhythm. Heart sounds: Normal heart sounds. Pulmonary:      Effort: Pulmonary effort is normal. No respiratory distress. Breath sounds: Normal breath sounds. No rales. Musculoskeletal:      Cervical back: Normal range of motion and neck supple. Right lower leg: No edema. Left lower leg: No edema. Lymphadenopathy:      Cervical: No cervical adenopathy. Skin:     General: Skin is warm and dry. Neurological:      Mental Status: She is alert and oriented to person, place, and time. Psychiatric:         Mood and Affect: Mood normal.         Behavior: Behavior normal.       ASSESSMENT/PLAN:    Yenni Ambrosio was seen today for follow-up. Diagnoses and all orders for this visit:    Need for prophylactic vaccination and inoculation against influenza  -     Influenza, FLUAD, (age 72 y+), IM, PF, 0.5 mL    Candida vaginitis  -     clotrimazole-betamethasone (LOTRISONE) 1-0.05 % cream; Apply topically 2 times daily Apply topically 2 times daily.     Essential hypertension  Blood pressure well controlled with current medication    Mixed hyperlipidemia  LDL is near goal of less than 100 with a value of 105    Anxiety  Well-controlled with current medication. Still having difficulty sleeping through the night. Primary insomnia  -     traZODone (DESYREL) 50 MG tablet; Take 1 tablet by mouth nightly as needed for Sleep      Return in about 4 months (around 4/14/2023). Please note portions of this note were completed with a voicerecognition program.  Efforts were made to edit the dictations but occasionally words are mis-transcribed.

## 2022-12-14 ENCOUNTER — OFFICE VISIT (OUTPATIENT)
Dept: FAMILY MEDICINE CLINIC | Age: 77
End: 2022-12-14
Payer: MEDICARE

## 2022-12-14 VITALS
SYSTOLIC BLOOD PRESSURE: 136 MMHG | BODY MASS INDEX: 31.49 KG/M2 | WEIGHT: 182 LBS | TEMPERATURE: 97.2 F | DIASTOLIC BLOOD PRESSURE: 80 MMHG

## 2022-12-14 DIAGNOSIS — I10 ESSENTIAL HYPERTENSION: ICD-10-CM

## 2022-12-14 DIAGNOSIS — Z23 NEED FOR PROPHYLACTIC VACCINATION AND INOCULATION AGAINST INFLUENZA: Primary | ICD-10-CM

## 2022-12-14 DIAGNOSIS — E78.2 MIXED HYPERLIPIDEMIA: ICD-10-CM

## 2022-12-14 DIAGNOSIS — B37.31 CANDIDA VAGINITIS: ICD-10-CM

## 2022-12-14 DIAGNOSIS — F41.9 ANXIETY: ICD-10-CM

## 2022-12-14 DIAGNOSIS — F51.01 PRIMARY INSOMNIA: ICD-10-CM

## 2022-12-14 PROCEDURE — 99214 OFFICE O/P EST MOD 30 MIN: CPT | Performed by: FAMILY MEDICINE

## 2022-12-14 PROCEDURE — G8417 CALC BMI ABV UP PARAM F/U: HCPCS | Performed by: FAMILY MEDICINE

## 2022-12-14 PROCEDURE — G8484 FLU IMMUNIZE NO ADMIN: HCPCS | Performed by: FAMILY MEDICINE

## 2022-12-14 PROCEDURE — 3078F DIAST BP <80 MM HG: CPT | Performed by: FAMILY MEDICINE

## 2022-12-14 PROCEDURE — 1090F PRES/ABSN URINE INCON ASSESS: CPT | Performed by: FAMILY MEDICINE

## 2022-12-14 PROCEDURE — G0008 ADMIN INFLUENZA VIRUS VAC: HCPCS | Performed by: FAMILY MEDICINE

## 2022-12-14 PROCEDURE — 3074F SYST BP LT 130 MM HG: CPT | Performed by: FAMILY MEDICINE

## 2022-12-14 PROCEDURE — 90694 VACC AIIV4 NO PRSRV 0.5ML IM: CPT | Performed by: FAMILY MEDICINE

## 2022-12-14 PROCEDURE — G8399 PT W/DXA RESULTS DOCUMENT: HCPCS | Performed by: FAMILY MEDICINE

## 2022-12-14 PROCEDURE — 1036F TOBACCO NON-USER: CPT | Performed by: FAMILY MEDICINE

## 2022-12-14 PROCEDURE — G8427 DOCREV CUR MEDS BY ELIG CLIN: HCPCS | Performed by: FAMILY MEDICINE

## 2022-12-14 PROCEDURE — 1123F ACP DISCUSS/DSCN MKR DOCD: CPT | Performed by: FAMILY MEDICINE

## 2022-12-14 RX ORDER — TRAZODONE HYDROCHLORIDE 50 MG/1
50 TABLET ORAL NIGHTLY PRN
Qty: 90 TABLET | Refills: 2 | Status: SHIPPED | OUTPATIENT
Start: 2022-12-14 | End: 2023-03-14

## 2022-12-14 RX ORDER — CLOTRIMAZOLE AND BETAMETHASONE DIPROPIONATE 10; .64 MG/G; MG/G
CREAM TOPICAL 2 TIMES DAILY
Qty: 15 G | Refills: 5 | Status: SHIPPED | OUTPATIENT
Start: 2022-12-14

## 2022-12-14 ASSESSMENT — ENCOUNTER SYMPTOMS
SHORTNESS OF BREATH: 1
DIARRHEA: 1
CHEST TIGHTNESS: 0
CONSTIPATION: 0
RHINORRHEA: 0
BACK PAIN: 1

## 2023-01-31 DIAGNOSIS — I10 ESSENTIAL HYPERTENSION: ICD-10-CM

## 2023-01-31 RX ORDER — ATENOLOL 100 MG/1
TABLET ORAL
Qty: 90 TABLET | Refills: 1 | Status: SHIPPED | OUTPATIENT
Start: 2023-01-31

## 2023-02-06 RX ORDER — ALENDRONATE SODIUM 70 MG/1
TABLET ORAL
Qty: 12 TABLET | Refills: 1 | Status: SHIPPED | OUTPATIENT
Start: 2023-02-06

## 2023-03-14 DIAGNOSIS — F51.01 PRIMARY INSOMNIA: ICD-10-CM

## 2023-03-14 RX ORDER — CLONAZEPAM 1 MG/1
TABLET ORAL
Qty: 90 TABLET | Refills: 1 | Status: SHIPPED | OUTPATIENT
Start: 2023-03-14 | End: 2024-03-12

## 2023-05-28 DIAGNOSIS — M19.90 ARTHRITIS: ICD-10-CM

## 2023-05-30 RX ORDER — MELOXICAM 15 MG/1
TABLET ORAL
Qty: 90 TABLET | Refills: 0 | Status: SHIPPED | OUTPATIENT
Start: 2023-05-30

## 2023-08-05 DIAGNOSIS — M19.90 ARTHRITIS: ICD-10-CM

## 2023-08-07 RX ORDER — MELOXICAM 15 MG/1
TABLET ORAL
Qty: 90 TABLET | Refills: 0 | Status: SHIPPED | OUTPATIENT
Start: 2023-08-07

## 2023-08-07 RX ORDER — ALENDRONATE SODIUM 70 MG/1
TABLET ORAL
Qty: 12 TABLET | Refills: 1 | Status: SHIPPED | OUTPATIENT
Start: 2023-08-07

## 2023-08-14 LAB
A/G RATIO: 1.1 (ref 1–2)
ALBUMIN SERPL-MCNC: 4 G/DL (ref 3.5–5.7)
ALBUMIN/PROTEIN TOTAL, SER/PL: 7.6 G/DL (ref 6–8.3)
ALP BLD-CCNC: 49 U/L (ref 34–104)
ALT SERPL-CCNC: 10 U/L (ref 7–52)
ANION GAP 4: 11 MMOL/L (ref 7–16)
AST W/O P-5'-P: 16 U/L (ref 13–39)
BASOPHILS # BLD: 0.3 %
BASOPHILS ABSOLUTE: 0 K/UL (ref 0–0.1)
BILIRUB SERPL-MCNC: 0.8 MG/DL (ref 0.3–1)
BUN BLDV-MCNC: 28 MG/DL (ref 7–25)
CALCIUM SERPL-MCNC: 10.4 MG/DL (ref 8.6–10.2)
CHLORIDE BLD-SCNC: 106 MMOL/L (ref 98–107)
CHOLESTEROL, STONE: 212 MG/DL
CO2: 25 MMOL/L (ref 21–31)
CREAT SERPL-MCNC: 1.17 MG/DL (ref 0.6–1.2)
EGFR FEMALE: 47 ML/MIN/1.73M2
EOSINOPHILS ABSOLUTE: 0.1 K/UL (ref 0–0.4)
EOSINOPHILS RELATIVE PERCENT: 0.9 %
GLOBULIN: 3.6 G/DL (ref 2.6–4.2)
GLUCOSE: 109 MG/DL (ref 74–109)
HDLC SERPL-MCNC: 60 MG/DL (ref 40–60)
HEMOGLOBIN URINE, QUAL: 13.4 G/DL (ref 12.1–15.8)
LDL CHOLESTEROL CALCULATED: 113 MG/DL (ref 0–99)
LYMPHOCYTES # BLD: 31.2 %
LYMPHOCYTES ABSOLUTE: 1.8 K/UL (ref 0.8–3.6)
MCH RBC QN AUTO: 30.9 PG (ref 28.4–33.4)
MCHC RBC AUTO-ENTMCNC: 34.3 G/DL (ref 31.1–37)
MCV RBC AUTO: 90.1 FL (ref 85–99)
MONOCYTES # BLD: 9.3 %
MONOCYTES ABSOLUTE: 0.5 K/UL (ref 0.3–0.9)
NEUTROPHILS ABSOLUTE: 3.4 K/UL (ref 2–7.3)
NEUTROPHILS/100 LEUKOCYTES: 58.3 %
PDW BLD-RTO: 12.7 % (ref 11.7–15.2)
PLATELET COUNT MANUAL: 253 K/UL (ref 154–393)
POTASSIUM SERPL-SCNC: 4.5 MMOL/L (ref 3.5–5.1)
RBC # BLD: 4.34 M/UL (ref 3.86–5.17)
SODIUM BLD-SCNC: 142 MMOL/L (ref 136–145)
SR HEMATOCRIT: 39.1 % (ref 35.8–46.5)
TRIGL SERPL-MCNC: 195 MG/DL
TSH ULTRASENSITIVE: 1.51 UIU/ML (ref 0.45–5.33)
VITAMIN D 25-HYDROXY: 42.1 NG/ML
VLDLC SERPL CALC-MCNC: 39 MG/DL (ref 0–40)
WBC BLOOD, MANUAL: 5.8 K/UL (ref 4–10.5)

## 2023-08-14 ASSESSMENT — ENCOUNTER SYMPTOMS: HEARTBURN: 1

## 2023-08-15 SDOH — HEALTH STABILITY: PHYSICAL HEALTH: ON AVERAGE, HOW MANY DAYS PER WEEK DO YOU ENGAGE IN MODERATE TO STRENUOUS EXERCISE (LIKE A BRISK WALK)?: 0 DAYS

## 2023-08-15 ASSESSMENT — PATIENT HEALTH QUESTIONNAIRE - PHQ9
SUM OF ALL RESPONSES TO PHQ QUESTIONS 1-9: 0
1. LITTLE INTEREST OR PLEASURE IN DOING THINGS: 0
SUM OF ALL RESPONSES TO PHQ9 QUESTIONS 1 & 2: 0
2. FEELING DOWN, DEPRESSED OR HOPELESS: 0

## 2023-08-15 ASSESSMENT — LIFESTYLE VARIABLES
HOW OFTEN DO YOU HAVE A DRINK CONTAINING ALCOHOL: 1
HOW OFTEN DO YOU HAVE A DRINK CONTAINING ALCOHOL: NEVER
HOW OFTEN DO YOU HAVE SIX OR MORE DRINKS ON ONE OCCASION: 1

## 2023-08-16 ENCOUNTER — OFFICE VISIT (OUTPATIENT)
Dept: FAMILY MEDICINE CLINIC | Age: 78
End: 2023-08-16

## 2023-08-16 VITALS
OXYGEN SATURATION: 97 % | WEIGHT: 191.2 LBS | HEIGHT: 64 IN | TEMPERATURE: 97 F | HEART RATE: 86 BPM | BODY MASS INDEX: 32.64 KG/M2 | RESPIRATION RATE: 12 BRPM | SYSTOLIC BLOOD PRESSURE: 116 MMHG | DIASTOLIC BLOOD PRESSURE: 78 MMHG

## 2023-08-16 DIAGNOSIS — N18.31 STAGE 3A CHRONIC KIDNEY DISEASE (HCC): ICD-10-CM

## 2023-08-16 DIAGNOSIS — E78.2 MIXED HYPERLIPIDEMIA: ICD-10-CM

## 2023-08-16 DIAGNOSIS — I10 ESSENTIAL HYPERTENSION: ICD-10-CM

## 2023-08-16 DIAGNOSIS — Z00.00 INITIAL MEDICARE ANNUAL WELLNESS VISIT: Primary | ICD-10-CM

## 2023-08-16 PROBLEM — N18.30 CHRONIC RENAL DISEASE, STAGE III (HCC): Status: ACTIVE | Noted: 2023-08-16

## 2023-08-16 ASSESSMENT — ENCOUNTER SYMPTOMS
RHINORRHEA: 1
SINUS PRESSURE: 0
CHEST TIGHTNESS: 0
CONSTIPATION: 0
SHORTNESS OF BREATH: 0
DIARRHEA: 0
BACK PAIN: 1
EYE ITCHING: 0

## 2023-08-16 ASSESSMENT — PATIENT HEALTH QUESTIONNAIRE - PHQ9
SUM OF ALL RESPONSES TO PHQ9 QUESTIONS 1 & 2: 0
2. FEELING DOWN, DEPRESSED OR HOPELESS: 0
1. LITTLE INTEREST OR PLEASURE IN DOING THINGS: 0
SUM OF ALL RESPONSES TO PHQ QUESTIONS 1-9: 0

## 2023-08-16 ASSESSMENT — LIFESTYLE VARIABLES
HOW MANY STANDARD DRINKS CONTAINING ALCOHOL DO YOU HAVE ON A TYPICAL DAY: PATIENT DOES NOT DRINK
HOW OFTEN DO YOU HAVE A DRINK CONTAINING ALCOHOL: NEVER

## 2023-09-26 DIAGNOSIS — F51.01 PRIMARY INSOMNIA: ICD-10-CM

## 2023-09-26 NOTE — TELEPHONE ENCOUNTER
Medication:   Requested Prescriptions     Pending Prescriptions Disp Refills    clonazePAM (KLONOPIN) 1 MG tablet 90 tablet 1     Sig: TAKE 1 TABLET NIGHTLY AS   NEEDED FOR SLEEP      Last Filled:      Patient Phone Number: [phone] (home)     Last appt: 8/16/2023   Next appt: 2/16/2024    Last OARRS:       12/13/2019     8:08 PM   RX Monitoring   Periodic Controlled Substance Monitoring No signs of potential drug abuse or diversion identified.      PDMP Monitoring:    Last PDMP Waldo Duvall as Reviewed [facility]):  Review User Review Instant Review Result   Kinza Lang 5/17/2021  6:06 PM Reviewed PDMP [1]     Preferred Pharmacy:   [address] [place] [address] [phone] [place] [address]  Phone: [phone] Fax: [phone]

## 2023-09-27 RX ORDER — CLONAZEPAM 1 MG/1
TABLET ORAL
Qty: 90 TABLET | Refills: 1 | Status: SHIPPED | OUTPATIENT
Start: 2023-09-27 | End: 2024-09-24

## 2023-10-24 ENCOUNTER — TELEPHONE (OUTPATIENT)
Dept: SURGERY | Age: 78
End: 2023-10-24

## 2023-10-24 ENCOUNTER — OFFICE VISIT (OUTPATIENT)
Dept: SURGERY | Age: 78
End: 2023-10-24
Payer: MEDICARE

## 2023-10-24 VITALS
HEART RATE: 64 BPM | HEIGHT: 64 IN | OXYGEN SATURATION: 95 % | RESPIRATION RATE: 18 BRPM | BODY MASS INDEX: 32.61 KG/M2 | WEIGHT: 191 LBS

## 2023-10-24 DIAGNOSIS — Z90.12 HISTORY OF LEFT MASTECTOMY: ICD-10-CM

## 2023-10-24 DIAGNOSIS — I89.0 LYMPHEDEMA: ICD-10-CM

## 2023-10-24 DIAGNOSIS — Z85.3 HISTORY OF BREAST CANCER: Primary | ICD-10-CM

## 2023-10-24 PROCEDURE — 1090F PRES/ABSN URINE INCON ASSESS: CPT | Performed by: NURSE PRACTITIONER

## 2023-10-24 PROCEDURE — G8417 CALC BMI ABV UP PARAM F/U: HCPCS | Performed by: NURSE PRACTITIONER

## 2023-10-24 PROCEDURE — G8399 PT W/DXA RESULTS DOCUMENT: HCPCS | Performed by: NURSE PRACTITIONER

## 2023-10-24 PROCEDURE — G8427 DOCREV CUR MEDS BY ELIG CLIN: HCPCS | Performed by: NURSE PRACTITIONER

## 2023-10-24 PROCEDURE — 1123F ACP DISCUSS/DSCN MKR DOCD: CPT | Performed by: NURSE PRACTITIONER

## 2023-10-24 PROCEDURE — 99213 OFFICE O/P EST LOW 20 MIN: CPT | Performed by: NURSE PRACTITIONER

## 2023-10-24 PROCEDURE — G8484 FLU IMMUNIZE NO ADMIN: HCPCS | Performed by: NURSE PRACTITIONER

## 2023-10-24 PROCEDURE — 1036F TOBACCO NON-USER: CPT | Performed by: NURSE PRACTITIONER

## 2023-10-24 NOTE — TELEPHONE ENCOUNTER
Patient called complaining about swelling that she has on the left breast side(Lt breast removed) and her left arm. Not sure if she should be concerned. Patient can be reached @[phone].

## 2023-10-24 NOTE — PROGRESS NOTES
[facility])   Surgical Breast Oncology       CC:  Breast check, abnormal breast imaging     HPI: Shae Bashir is a 78 y.o. woman here for left arm swelling. Reports left upper extremity swelling for a few months, first started noticing her shirts were not fitting over her left upper arm and she had to buy new shirts. Also thinks there is increased pressure on the lateral chest wall she rests her arm down. Good ROM. Denies pain, numbness, tingling, skin changes. She has a personal history of left breast cancer 2008, treated for an invasive ductal carcinoma. She reports having a left mastectomy with node evaluation. She required chemotherapy with Herceptin. She did not undergo radiation and did not take anti-estrogens. She has also had left diaphragm surgery ~7-8 years ago (~[phone])    She states that she does perform routine self breast evaluations and has not noticed any new abnormalities such as masses, skin changes, color changes,nipple discharge, or changes to the nipple-areolar complex. INTERVAL HX:  On 7/20/2017 she underwent right breast diagnostic imaging. There is a focal grouping of coarse calcifications in the right breast that are considered low suspicion for malignancy. They're likely involutional. BI-RADS 3. On 2/2/2018 she underwent right breast diagnostic mammography. Benign calcifications were again noted with a focal grouping of the lateral right breast. This appears unchanged. There is no associated mass or distortion. BI-RADS 2. On 2/4/2019 she underwent unilateral right breast imaging. Stable changes including calcifications are noted in the right breast. There are no new concerning findings suggestive of malignancy. BI-RADS 2. On 3/6/2020 she underwent unilateral right screening mammogram.  Postsurgical changes are noted in the right breast.  Stable calcifications in the right breast are without change over multiple prior exams.   There are no new

## 2023-11-03 DIAGNOSIS — I10 ESSENTIAL HYPERTENSION: ICD-10-CM

## 2023-11-03 RX ORDER — ATENOLOL 100 MG/1
TABLET ORAL
Qty: 90 TABLET | Refills: 0 | Status: SHIPPED | OUTPATIENT
Start: 2023-11-03

## 2023-11-03 NOTE — TELEPHONE ENCOUNTER
Medication:   Requested Prescriptions     Pending Prescriptions Disp Refills    atenolol (TENORMIN) 100 MG tablet [Pharmacy Med Name: ATENOLOL TAB 100MG] 90 tablet 3     Sig: TAKE 1 TABLET DAILY      Last Filled:      Patient Phone Number: [phone] (home)     Last appt: 8/16/2023   Next appt: 2/16/2024    Last OARRS:       12/13/2019     8:08 PM   RX Monitoring   Periodic Controlled Substance Monitoring No signs of potential drug abuse or diversion identified.      PDMP Monitoring:    Last PDMP Joanne Mayer as Reviewed [facility]):  Review User Review Instant Review Result   Lexii Hedrick 5/17/2021  6:06 PM Reviewed PDMP [1]     Preferred Pharmacy:   [address] [place] [address] [phone] [place] [address]  Phone: [phone] Fax: [phone]

## 2023-11-10 DIAGNOSIS — M19.90 ARTHRITIS: ICD-10-CM

## 2023-11-10 RX ORDER — MELOXICAM 15 MG/1
15 TABLET ORAL DAILY
Qty: 90 TABLET | Refills: 0 | Status: SHIPPED | OUTPATIENT
Start: 2023-11-10

## 2023-11-17 DIAGNOSIS — E78.2 MIXED HYPERLIPIDEMIA: ICD-10-CM

## 2023-11-17 RX ORDER — PRAVASTATIN SODIUM 40 MG
40 TABLET ORAL DAILY
Qty: 90 TABLET | Refills: 3 | Status: SHIPPED | OUTPATIENT
Start: 2023-11-17

## 2023-12-08 DIAGNOSIS — I10 ESSENTIAL HYPERTENSION: ICD-10-CM

## 2023-12-08 DIAGNOSIS — K21.9 GASTROESOPHAGEAL REFLUX DISEASE WITHOUT ESOPHAGITIS: ICD-10-CM

## 2023-12-08 RX ORDER — OMEPRAZOLE 20 MG/1
20 CAPSULE, DELAYED RELEASE ORAL
Qty: 90 CAPSULE | Refills: 3 | Status: SHIPPED | OUTPATIENT
Start: 2023-12-08 | End: 2024-12-02

## 2023-12-08 RX ORDER — HYDROCHLOROTHIAZIDE 25 MG/1
TABLET ORAL
Qty: 90 TABLET | Refills: 3 | Status: SHIPPED | OUTPATIENT
Start: 2023-12-08

## 2023-12-19 NOTE — PROGRESS NOTES
SUBJECTIVE:    Grecia Mcconnell is a 64 y.o. female who presents for a follow up visit. Chief Complaint   Patient presents with    Follow-up     Patient is here for a follow up. Discuss lab. Hyperlipidemia  This is a chronic problem. The current episode started more than 1 year ago. The problem is controlled. Lipid results: Total cholesterol 222, triglycerides 173, HDL 61, . Exacerbating diseases include obesity. Factors aggravating her hyperlipidemia include thiazides. Associated symptoms include chest pain ( int felt to be myofacial). Pertinent negatives include no shortness of breath. Current antihyperlipidemic treatment includes statins. The current treatment provides significant improvement of lipids. Compliance problems include adherence to exercise and adherence to diet. Risk factors for coronary artery disease include hypertension, obesity, a sedentary lifestyle, post-menopausal and dyslipidemia. Hypertension  This is a chronic problem. The current episode started more than 1 year ago. The problem is controlled. Associated symptoms include chest pain ( int felt to be myofacial). Pertinent negatives include no headaches or shortness of breath. Risk factors for coronary artery disease include obesity, sedentary lifestyle, post-menopausal state and dyslipidemia. Past treatments include ACE inhibitors, beta blockers and diuretics. The current treatment provides significant improvement. Compliance problems include exercise and diet. Insomnia  This is a chronic problem. Patient has multiple frequent middle of the night awakening. She does use Klonopin at night but still has problems. She does not want to try anything different. Patient notes that she is not very active during the day. Patient's medications, allergies, past medical,surgical, social and family histories were reviewed and updated as appropriate.      Past Medical History:   Diagnosis Date    Cancer [facility]) breast, left    Congenital hernia of the diaphragm     History of therapeutic radiation     Hyperlipidemia     Hypertension      Past Surgical History:   Procedure Laterality Date    APPENDECTOMY      BACK SURGERY  11/09/2020    several back injections    BREAST LUMPECTOMY      BREAST SURGERY      left mastectomy    EYE SURGERY      HERNIA REPAIR      lapascopic hiatal hernia repair    MASTECTOMY      TOTAL KNEE ARTHROPLASTY  04/2016    cangemi-  multiple cortison injections - left knee    TUBAL LIGATION       Family History   Problem Relation Age of Onset    High Blood Pressure Mother     Heart Disease Mother     High Blood Pressure Sister     Diabetes Sister     High Blood Pressure Brother     Heart Disease Brother     Cancer Maternal Uncle     Breast Cancer Paternal Aunt     Uterine Cancer Maternal Grandmother     Ovarian Cancer Maternal Grandmother      Social History     Tobacco Use    Smoking status: Never Smoker    Smokeless tobacco: Never Used   Substance Use Topics    Alcohol use: No     Alcohol/week: 0.0 standard drinks      Allergies   Allergen Reactions    Cephalosporins Hives    Mirtazapine      Increased \"knee pain\"    Nickel     Tetanus Toxoids     Tramadol Nausea Only     Current Outpatient Medications on File Prior to Visit   Medication Sig Dispense Refill    meloxicam (MOBIC) 7.5 MG tablet Take 7.5 mg by mouth daily      pravastatin (PRAVACHOL) 20 MG tablet Take 1 tablet by mouth daily 90 tablet 3    clonazePAM (KLONOPIN) 1 MG tablet TAKE 1 TABLET NIGHTLY AS   NEEDED FOR SLEEP 90 tablet 1    rivaroxaban (XARELTO) 20 MG TABS tablet Take 1 tablet by mouth daily (with breakfast) 90 tablet 1    lisinopril (PRINIVIL;ZESTRIL) 40 MG tablet TAKE 1 TABLET DAILY 90 tablet 3    Breast Prosthesis MISC by Does not apply route left breast prosthetic and bra. Please give max for insurance coverage.  1 each 5    acetaminophen (TYLENOL ARTHRITIS PAIN) 650 MG CR tablet Take 650 mg by mouth 2 times daily       calcium carbonate-vitamin D (CALCIUM 600 + D) 600-400 MG-UNIT TABS per tab Take 1 tablet by mouth every other day        No current facility-administered medications on file prior to visit. Review of Systems   Constitutional: Negative for activity change and fatigue. HENT: Negative for congestion, postnasal drip and rhinorrhea. Respiratory: Negative for chest tightness and shortness of breath. Cardiovascular: Positive for chest pain ( int felt to be myofacial). Gastrointestinal: Negative for constipation and diarrhea. Genitourinary: Negative for difficulty urinating. Musculoskeletal: Positive for back pain. Neurological: Negative for dizziness, light-headedness and headaches. Psychiatric/Behavioral: Negative for dysphoric mood and sleep disturbance ( MNA). The patient is not nervous/anxious. OBJECTIVE:    /80   Temp 97.2 °F (36.2 °C)   Wt 195 lb (88.5 kg)   BMI 33.47 kg/m²    Physical Exam  Constitutional:       Appearance: She is well-developed. She is obese. HENT:      Head: Normocephalic and atraumatic. Right Ear: Tympanic membrane and external ear normal.      Left Ear: Tympanic membrane and external ear normal.      Nose: Nose normal.      Mouth/Throat:      Mouth: Mucous membranes are moist.      Pharynx: No posterior oropharyngeal erythema. Eyes:      General:         Right eye: No discharge. Conjunctiva/sclera: Conjunctivae normal.   Neck:      Thyroid: No thyromegaly. Vascular: No JVD. Trachea: No tracheal deviation. Cardiovascular:      Rate and Rhythm: Normal rate and regular rhythm. Heart sounds: Normal heart sounds. Pulmonary:      Effort: Pulmonary effort is normal. No respiratory distress. Breath sounds: Normal breath sounds. No rales. Musculoskeletal:      Cervical back: Normal range of motion and neck supple. Lymphadenopathy:      Cervical: No cervical adenopathy.    Skin:     General: Skin is warm and dry. Neurological:      Mental Status: She is alert and oriented to person, place, and time. Psychiatric:         Mood and Affect: Mood normal.         Behavior: Behavior normal.         ASSESSMENT/PLAN:    Sheila Meyer was seen today for follow-up. Diagnoses and all orders for this visit:    Need for influenza vaccination  -     INFLUENZA, QUADV, ADJUVANTED, 65 YRS =, IM, PF, PREFILL SYR, 0.5ML (FLUAD)    Essential hypertension  Well-controlled with current medications  -     hydroCHLOROthiazide (HYDRODIURIL) 25 MG tablet; TAKE 1 TABLET DAILY  -     atenolol (TENORMIN) 100 MG tablet; TAKE 1 TABLET DAILY    Candida vaginitis  -     clotrimazole-betamethasone (LOTRISONE) 1-0.05 % cream; Apply topically 2 times daily Apply topically 2 times daily. Mixed hyperlipidemia  We will continue pravastatin at 20 mg daily      Return in about 3 months (around 12/29/2021). Please note portions of this note were completed with a voicerecognition program.  Efforts were made to edit the dictations but occasionally words are mis-transcribed. 1

## 2024-01-10 LAB
A/G RATIO: 1.5 (ref 1–2)
ALBUMIN SERPL-MCNC: 4.4 G/DL (ref 3.5–5.7)
ALP BLD-CCNC: 52 U/L (ref 34–104)
ALT SERPL-CCNC: 8 U/L (ref 7–52)
ANION GAP SERPL CALCULATED.3IONS-SCNC: 11 MMOL/L (ref 7–16)
AST SERPL-CCNC: 16 U/L (ref 13–39)
BASOPHILS ABSOLUTE: 0 K/UL (ref 0–0.1)
BASOPHILS RELATIVE PERCENT: 0.3 %
BILIRUB SERPL-MCNC: 0.8 MG/DL (ref 0.3–1)
BUN BLDV-MCNC: 20 MG/DL (ref 7–25)
CALCIUM SERPL-MCNC: 10.1 MG/DL (ref 8.6–10.2)
CHLORIDE BLD-SCNC: 111 MMOL/L (ref 98–107)
CHOLESTEROL: 208 MG/DL
CO2: 23 MMOL/L (ref 21–31)
CREAT SERPL-MCNC: 0.99 MG/DL (ref 0.6–1.2)
EGFR FEMALE: 58 ML/MIN/1.73M2
EOSINOPHILS ABSOLUTE: 0.1 K/UL (ref 0–0.4)
EOSINOPHILS RELATIVE PERCENT: 0.9 %
GLOBULIN: 2.9 G/DL (ref 2.6–4.2)
GLUCOSE BLD-MCNC: 109 MG/DL (ref 74–109)
HCT VFR BLD CALC: 37.9 % (ref 35.8–46.5)
HDLC SERPL-MCNC: 62 MG/DL (ref 40–60)
HEMOGLOBIN: 13.1 G/DL (ref 12.1–15.8)
LDL CHOLESTEROL: 106 MG/DL (ref 0–99)
LYMPHOCYTES ABSOLUTE: 2 K/UL (ref 0.8–3.6)
LYMPHOCYTES RELATIVE PERCENT: 29.2 %
MCH RBC QN AUTO: 30.9 PG (ref 28.4–33.4)
MCHC RBC AUTO-ENTMCNC: 34.5 G/DL (ref 31.1–37)
MCV RBC AUTO: 89.4 FL (ref 85–99)
MONOCYTES ABSOLUTE: 0.6 K/UL (ref 0.3–0.9)
MONOCYTES RELATIVE PERCENT: 8.4 %
NEUTROPHILS ABSOLUTE: 4.2 K/UL (ref 2–7.3)
NEUTROPHILS RELATIVE PERCENT: 61.2 %
PDW BLD-RTO: 13.4 % (ref 11.7–15.2)
PLATELET # BLD: 273 K/UL (ref 154–393)
POTASSIUM SERPL-SCNC: 4.3 MMOL/L (ref 3.5–5.1)
RBC # BLD: 4.24 M/UL (ref 3.86–5.17)
SODIUM BLD-SCNC: 145 MMOL/L (ref 136–145)
TOTAL PROTEIN: 7.3 G/DL (ref 6–8.3)
TRIGL SERPL-MCNC: 198 MG/DL
VLDLC SERPL CALC-MCNC: 40 MG/DL (ref 0–40)
WBC # BLD: 6.9 K/UL (ref 4–10.5)

## 2024-01-17 ENCOUNTER — OFFICE VISIT (OUTPATIENT)
Dept: FAMILY MEDICINE CLINIC | Age: 79
End: 2024-01-17

## 2024-01-17 ENCOUNTER — HOSPITAL ENCOUNTER (OUTPATIENT)
Dept: CT IMAGING | Age: 79
Discharge: HOME | End: 2024-01-17
Attending: FAMILY MEDICINE
Payer: MEDICARE

## 2024-01-17 VITALS
SYSTOLIC BLOOD PRESSURE: 130 MMHG | TEMPERATURE: 97.2 F | DIASTOLIC BLOOD PRESSURE: 84 MMHG | HEART RATE: 69 BPM | RESPIRATION RATE: 16 BRPM | WEIGHT: 196.8 LBS | OXYGEN SATURATION: 98 % | BODY MASS INDEX: 33.78 KG/M2

## 2024-01-17 DIAGNOSIS — S09.90XA INJURY OF HEAD, INITIAL ENCOUNTER: Primary | ICD-10-CM

## 2024-01-17 DIAGNOSIS — E78.2 MIXED HYPERLIPIDEMIA: ICD-10-CM

## 2024-01-17 DIAGNOSIS — I10 ESSENTIAL HYPERTENSION: ICD-10-CM

## 2024-01-17 DIAGNOSIS — N18.31 STAGE 3A CHRONIC KIDNEY DISEASE (HCC): ICD-10-CM

## 2024-01-17 DIAGNOSIS — S09.90XA INJURY OF HEAD, INITIAL ENCOUNTER: ICD-10-CM

## 2024-01-17 DIAGNOSIS — I82.412 DVT OF DEEP FEMORAL VEIN, LEFT (HCC): ICD-10-CM

## 2024-01-17 DIAGNOSIS — F13.20 SEDATIVE, HYPNOTIC OR ANXIOLYTIC DEPENDENCE, UNCOMPLICATED (HCC): ICD-10-CM

## 2024-01-17 PROCEDURE — 70450 CT HEAD/BRAIN W/O DYE: CPT

## 2024-01-17 RX ORDER — CLOTRIMAZOLE AND BETAMETHASONE DIPROPIONATE 10; .64 MG/G; MG/G
CREAM TOPICAL
COMMUNITY
Start: 2023-11-17

## 2024-01-17 ASSESSMENT — ENCOUNTER SYMPTOMS
CHEST TIGHTNESS: 0
SHORTNESS OF BREATH: 0
NAUSEA: 0
RHINORRHEA: 0
SINUS PRESSURE: 0
HEARTBURN: 1
CONSTIPATION: 0
BACK PAIN: 0
DIARRHEA: 0

## 2024-01-17 ASSESSMENT — PATIENT HEALTH QUESTIONNAIRE - PHQ9
2. FEELING DOWN, DEPRESSED OR HOPELESS: 0
SUM OF ALL RESPONSES TO PHQ QUESTIONS 1-9: 0

## 2024-01-17 NOTE — PROGRESS NOTES
dizziness.   Psychiatric/Behavioral:  Positive for sleep disturbance (MNA). Negative for dysphoric mood. The patient is nervous/anxious.        OBJECTIVE:    /84 (Site: Right Upper Arm, Position: Sitting, Cuff Size: Large Adult)   Pulse 69   Temp 97.2 °F (36.2 °C) (Infrared)   Resp 16   Wt 89.3 kg (196 lb 12.8 oz)   SpO2 98%   BMI 33.78 kg/m²    Physical Exam  Constitutional:       General: She is not in acute distress.     Appearance: She is obese.   HENT:      Head: Normocephalic and atraumatic.      Right Ear: Tympanic membrane normal.      Left Ear: Tympanic membrane normal.      Nose: Nose normal. No rhinorrhea.      Mouth/Throat:      Mouth: Mucous membranes are moist.      Pharynx: No posterior oropharyngeal erythema.   Cardiovascular:      Rate and Rhythm: Normal rate and regular rhythm.   Pulmonary:      Effort: Pulmonary effort is normal.      Breath sounds: Normal breath sounds.   Musculoskeletal:      Cervical back: Neck supple. No rigidity.      Right lower leg: No edema.      Left lower leg: No edema.   Lymphadenopathy:      Cervical: No cervical adenopathy.   Neurological:      General: No focal deficit present.      Mental Status: She is oriented to person, place, and time.      Cranial Nerves: No cranial nerve deficit.      Motor: No weakness.      Coordination: Coordination normal.      Gait: Gait abnormal.      Deep Tendon Reflexes: Reflexes normal.   Psychiatric:         Mood and Affect: Mood normal.         Behavior: Behavior normal.         ASSESSMENT/PLAN:    Leonela was seen today for dizziness.    Diagnoses and all orders for this visit:    Injury of head, initial encounter  -     CT HEAD WO CONTRAST; Future Report called : negative.  -     Hilda Nelson MD, Neurology, [facility]    Sedative, hypnotic or anxiolytic dependence, uncomplicated (HCC)  Tolerating clonazepam well.    DVT of deep femoral vein, left (HCC)  Provoked from immobility. Multiple hours in a car

## 2024-02-06 DIAGNOSIS — I10 ESSENTIAL HYPERTENSION: ICD-10-CM

## 2024-02-06 RX ORDER — ALENDRONATE SODIUM 70 MG/1
TABLET ORAL
Qty: 12 TABLET | Refills: 1 | Status: SHIPPED | OUTPATIENT
Start: 2024-02-06

## 2024-02-06 RX ORDER — ATENOLOL 100 MG/1
100 TABLET ORAL DAILY
Qty: 90 TABLET | Refills: 0 | Status: SHIPPED | OUTPATIENT
Start: 2024-02-06

## 2024-02-23 DIAGNOSIS — M19.90 ARTHRITIS: ICD-10-CM

## 2024-02-23 RX ORDER — MELOXICAM 15 MG/1
15 TABLET ORAL DAILY
Qty: 90 TABLET | Refills: 0 | Status: SHIPPED | OUTPATIENT
Start: 2024-02-23

## 2024-03-05 ENCOUNTER — OFFICE VISIT (OUTPATIENT)
Dept: NEUROLOGY | Age: 79
End: 2024-03-05
Payer: MEDICARE

## 2024-03-05 VITALS
WEIGHT: 195 LBS | HEIGHT: 64 IN | DIASTOLIC BLOOD PRESSURE: 80 MMHG | BODY MASS INDEX: 33.29 KG/M2 | SYSTOLIC BLOOD PRESSURE: 147 MMHG

## 2024-03-05 DIAGNOSIS — I10 ESSENTIAL HYPERTENSION: ICD-10-CM

## 2024-03-05 DIAGNOSIS — R55 PRE-SYNCOPE: Primary | ICD-10-CM

## 2024-03-05 DIAGNOSIS — F07.81 POST CONCUSSION SYNDROME: ICD-10-CM

## 2024-03-05 DIAGNOSIS — R42 DIZZINESS: ICD-10-CM

## 2024-03-05 PROCEDURE — 1036F TOBACCO NON-USER: CPT | Performed by: PSYCHIATRY & NEUROLOGY

## 2024-03-05 PROCEDURE — G8484 FLU IMMUNIZE NO ADMIN: HCPCS | Performed by: PSYCHIATRY & NEUROLOGY

## 2024-03-05 PROCEDURE — 1123F ACP DISCUSS/DSCN MKR DOCD: CPT | Performed by: PSYCHIATRY & NEUROLOGY

## 2024-03-05 PROCEDURE — 3077F SYST BP >= 140 MM HG: CPT | Performed by: PSYCHIATRY & NEUROLOGY

## 2024-03-05 PROCEDURE — G8427 DOCREV CUR MEDS BY ELIG CLIN: HCPCS | Performed by: PSYCHIATRY & NEUROLOGY

## 2024-03-05 PROCEDURE — 99204 OFFICE O/P NEW MOD 45 MIN: CPT | Performed by: PSYCHIATRY & NEUROLOGY

## 2024-03-05 PROCEDURE — G8417 CALC BMI ABV UP PARAM F/U: HCPCS | Performed by: PSYCHIATRY & NEUROLOGY

## 2024-03-05 PROCEDURE — 1090F PRES/ABSN URINE INCON ASSESS: CPT | Performed by: PSYCHIATRY & NEUROLOGY

## 2024-03-05 PROCEDURE — 3079F DIAST BP 80-89 MM HG: CPT | Performed by: PSYCHIATRY & NEUROLOGY

## 2024-03-05 PROCEDURE — G8399 PT W/DXA RESULTS DOCUMENT: HCPCS | Performed by: PSYCHIATRY & NEUROLOGY

## 2024-03-05 NOTE — PROGRESS NOTES
The patient is a 78 y.o. years old female who  was referred by Lupillo Douglas Jr* for consultation regarding recent concussion , presyncope and dizziness.     HPI:    Patient sustained mechanical fall back in November of last year.  She did have some closed head injury but no loss of consciousness or significant trauma.  Since that time, has been having intermittent episode of dizziness with presyncopal episode.  Description lightheaded and dizziness and feeling as if she is going to faint for few seconds to minutes.  Initially these episodes were daily for quite some time but improved since January.  Now frequency is sporadic.  Once every 2 weeks.  No spinning sensation.  No headache, dysphagia or dysarthria or ringing in the ears.  No weakness or numbness or tingling.  Recent CT head was unremarkable.  She is on blood pressure medicine daily.  She denies any recent LOC.  Other review of system was unremarkable.      ROS : A 10-14 system review of constitutional, cardiovascular, respiratory, GI, eyes, , ENT, musculoskeletal, endocrine, skin, SHEENT, genitourinary, psychiatric and neurologic systems was obtained and updated today and is unremarkable except as mentioned in my HPI        Exam:   Constitutional:   Vitals:    03/05/24 1110   BP: (!) 147/80   Site: Right Upper Arm   Position: Sitting   Weight: 88.5 kg (195 lb)   Height: 1.626 m (5' 4\")       General appearance:  Normal development and appear in no acute distress.   Mental Status:   Oriented to person, place, problem, and time.    Memory: Good immediate recall.  Intact remote memory  Normal attention span and concentration.  Language: intact naming, repeating and fluency   Good fund of Knowledge. Aware of current events and vocabulary   Cranial Nerves:   II: Visual fields: Full. Pupils: equal, round, reactive to light  III,IV,VI: Extra Ocular Movements are intact. No nystagmus  V: Facial sensation is intact  VII: Facial strength and movements:

## 2024-03-11 ENCOUNTER — TELEPHONE (OUTPATIENT)
Dept: NEUROLOGY | Age: 79
End: 2024-03-11

## 2024-03-11 DIAGNOSIS — E78.2 MIXED HYPERLIPIDEMIA: ICD-10-CM

## 2024-03-11 DIAGNOSIS — M19.90 ARTHRITIS: ICD-10-CM

## 2024-03-11 DIAGNOSIS — I10 ESSENTIAL HYPERTENSION: ICD-10-CM

## 2024-03-11 DIAGNOSIS — K21.9 GASTROESOPHAGEAL REFLUX DISEASE WITHOUT ESOPHAGITIS: ICD-10-CM

## 2024-03-11 RX ORDER — ALENDRONATE SODIUM 70 MG/1
TABLET ORAL
Qty: 12 TABLET | Refills: 1 | Status: SHIPPED | OUTPATIENT
Start: 2024-03-11

## 2024-03-11 RX ORDER — HYDROCHLOROTHIAZIDE 25 MG/1
TABLET ORAL
Qty: 90 TABLET | Refills: 3 | Status: SHIPPED | OUTPATIENT
Start: 2024-03-11

## 2024-03-11 RX ORDER — ATENOLOL 100 MG/1
100 TABLET ORAL DAILY
Qty: 90 TABLET | Refills: 1 | Status: SHIPPED | OUTPATIENT
Start: 2024-03-11

## 2024-03-11 RX ORDER — PRAVASTATIN SODIUM 40 MG
40 TABLET ORAL DAILY
Qty: 90 TABLET | Refills: 3 | Status: SHIPPED | OUTPATIENT
Start: 2024-03-11

## 2024-03-11 RX ORDER — OMEPRAZOLE 20 MG/1
20 CAPSULE, DELAYED RELEASE ORAL
Qty: 90 CAPSULE | Refills: 3 | Status: SHIPPED | OUTPATIENT
Start: 2024-03-11 | End: 2025-03-06

## 2024-03-11 RX ORDER — LISINOPRIL 40 MG/1
TABLET ORAL
Qty: 90 TABLET | Refills: 3 | Status: SHIPPED | OUTPATIENT
Start: 2024-03-11

## 2024-03-11 NOTE — TELEPHONE ENCOUNTER
Rec'd blood pressure diary with orthostatic changes from patient as discussed during 3/5/24. Scanned in Media. Please review

## 2024-03-12 NOTE — TELEPHONE ENCOUNTER
Spoke to pt and advised as stated:     No major change with position but blood pressure is not optimally controlled   follow-up with primary care physician for blood pressure control     Pt states understanding

## 2024-03-19 ENCOUNTER — TELEPHONE (OUTPATIENT)
Dept: FAMILY MEDICINE CLINIC | Age: 79
End: 2024-03-19

## 2024-03-19 RX ORDER — AMLODIPINE BESYLATE 2.5 MG/1
2.5 TABLET ORAL DAILY
Qty: 30 TABLET | Refills: 0 | Status: SHIPPED | OUTPATIENT
Start: 2024-03-19

## 2024-03-19 NOTE — TELEPHONE ENCOUNTER
Patient saw neurology, Dr. Ivy- note in chart, he wants to rule out dizziness is not related to B/P, he had pt check her B/P the past few days prior to taking her B/P medication.  Her readings were 157/74, 176/82, 159/72.  He wanted patient to reach out to you for possible medication change.

## 2024-03-19 NOTE — TELEPHONE ENCOUNTER
Pt would like a call back in regards to blood pressure. Pt was referred to a neurologist on 01/17 and he was concerned with pt dizziness. Pt would like a call back.     Please advise...

## 2024-03-19 NOTE — TELEPHONE ENCOUNTER
Start Norvasc 2.5 mg 1 daily and appt to see me in 3 weeks.  Please bring a list of blood pressure readings with you.

## 2024-03-22 DIAGNOSIS — F51.01 PRIMARY INSOMNIA: ICD-10-CM

## 2024-03-22 RX ORDER — CLONAZEPAM 1 MG/1
TABLET ORAL
Qty: 90 TABLET | Refills: 0 | Status: SHIPPED | OUTPATIENT
Start: 2024-03-22 | End: 2025-03-20

## 2024-03-22 NOTE — TELEPHONE ENCOUNTER
clonazePAM (KLONOPIN) 1 MG tablet [[id number]]       [facility] - [address] [place] - [facility] - P [phone] - F [phone]  [facility] [place] [address]  Phone: [phone]  Fax: [phone]

## 2024-03-22 NOTE — TELEPHONE ENCOUNTER
Medication:   Requested Prescriptions     Pending Prescriptions Disp Refills    clonazePAM (KLONOPIN) 1 MG tablet 90 tablet 1     Sig: TAKE 1 TABLET NIGHTLY AS   NEEDED FOR SLEEP      Last Filled:  09/27/23    Patient Phone Number: [phone] (home)     Last appt: 1/17/2024   Next appt: 4/9/2024    Last OARRS:       12/13/2019     8:08 PM   RX Monitoring   Periodic Controlled Substance Monitoring No signs of potential drug abuse or diversion identified.     PDMP Monitoring:    Last PDMP [place] as Reviewed ([place]):  Review User Review Instant Review Result   ERENDIRA DAWN JR. 5/17/2021  6:06 PM Reviewed PDMP [1]     Preferred Pharmacy:   [facility] - [address] - [facility] [phone] - F [phone]  [facility]  [place] [address]  Phone: [phone] Fax: [phone]    [facility]/pharmacy #[id number] - [place] - [address] [facility] [phone] - F [phone]  [address] [facility] [address]  Phone: [phone] Fax: [phone]

## 2024-04-04 LAB
A/G RATIO: 1.6 (ref 1–2)
ALBUMIN SERPL-MCNC: 4.6 G/DL (ref 3.5–5.7)
ALP BLD-CCNC: 55 U/L (ref 34–104)
ALT SERPL-CCNC: 11 U/L (ref 7–52)
ANION GAP SERPL CALCULATED.3IONS-SCNC: 11 MMOL/L (ref 7–16)
AST SERPL-CCNC: 17 U/L (ref 13–39)
BASOPHILS ABSOLUTE: 0 K/UL (ref 0–0.1)
BASOPHILS RELATIVE PERCENT: 0.3 %
BILIRUB SERPL-MCNC: 0.8 MG/DL (ref 0.3–1)
BUN BLDV-MCNC: 27 MG/DL (ref 7–25)
CALCIUM SERPL-MCNC: 10.1 MG/DL (ref 8.6–10.2)
CHLORIDE BLD-SCNC: 107 MMOL/L (ref 98–107)
CHOLESTEROL: 195 MG/DL
CO2: 23 MMOL/L (ref 21–31)
CREAT SERPL-MCNC: 1.05 MG/DL (ref 0.6–1.2)
EGFR FEMALE: 53 ML/MIN/1.73M2
EOSINOPHILS ABSOLUTE: 0.1 K/UL (ref 0–0.4)
EOSINOPHILS RELATIVE PERCENT: 1.4 %
GLOBULIN: 2.8 G/DL (ref 2.6–4.2)
GLUCOSE BLD-MCNC: 116 MG/DL (ref 74–109)
HCT VFR BLD CALC: 38.9 % (ref 35.8–46.5)
HDLC SERPL-MCNC: 58 MG/DL (ref 40–60)
HEMOGLOBIN: 12.8 G/DL (ref 12.1–15.8)
LDL CHOLESTEROL: 103 MG/DL (ref 0–99)
LYMPHOCYTES ABSOLUTE: 1.8 K/UL (ref 0.8–3.6)
LYMPHOCYTES RELATIVE PERCENT: 31.4 %
MCH RBC QN AUTO: 29.4 PG (ref 28.4–33.4)
MCHC RBC AUTO-ENTMCNC: 33 G/DL (ref 31.1–37)
MCV RBC AUTO: 89 FL (ref 85–99)
MONOCYTES ABSOLUTE: 0.5 K/UL (ref 0.3–0.9)
MONOCYTES RELATIVE PERCENT: 8.5 %
NEUTROPHILS ABSOLUTE: 3.4 K/UL (ref 2–7.3)
NEUTROPHILS RELATIVE PERCENT: 58.4 %
PDW BLD-RTO: 13.5 % (ref 11.7–15.2)
PLATELET # BLD: 277 K/UL (ref 154–393)
POTASSIUM SERPL-SCNC: 4.8 MMOL/L (ref 3.5–5.1)
RBC # BLD: 4.36 M/UL (ref 3.86–5.17)
SODIUM BLD-SCNC: 141 MMOL/L (ref 136–145)
TOTAL PROTEIN: 7.4 G/DL (ref 6–8.3)
TRIGL SERPL-MCNC: 169 MG/DL
TSH SERPL DL<=0.05 MIU/L-ACNC: 1.68 UIU/ML (ref 0.45–5.33)
VITAMIN B-12: 260 PG/ML (ref 180–914)
VLDLC SERPL CALC-MCNC: 34 MG/DL (ref 0–40)
WBC # BLD: 5.9 K/UL (ref 4–10.5)

## 2024-04-09 ENCOUNTER — OFFICE VISIT (OUTPATIENT)
Dept: FAMILY MEDICINE CLINIC | Age: 79
End: 2024-04-09

## 2024-04-09 VITALS
SYSTOLIC BLOOD PRESSURE: 138 MMHG | OXYGEN SATURATION: 95 % | DIASTOLIC BLOOD PRESSURE: 78 MMHG | BODY MASS INDEX: 33.3 KG/M2 | WEIGHT: 194 LBS | HEART RATE: 68 BPM

## 2024-04-09 DIAGNOSIS — G89.29 CHRONIC LEFT-SIDED LOW BACK PAIN WITHOUT SCIATICA: ICD-10-CM

## 2024-04-09 DIAGNOSIS — I10 ESSENTIAL HYPERTENSION: Primary | ICD-10-CM

## 2024-04-09 DIAGNOSIS — B37.9 CANDIDA INFECTION: ICD-10-CM

## 2024-04-09 DIAGNOSIS — E78.2 MIXED HYPERLIPIDEMIA: ICD-10-CM

## 2024-04-09 DIAGNOSIS — E53.8 VITAMIN B 12 DEFICIENCY: ICD-10-CM

## 2024-04-09 DIAGNOSIS — M54.50 CHRONIC LEFT-SIDED LOW BACK PAIN WITHOUT SCIATICA: ICD-10-CM

## 2024-04-09 RX ORDER — AMLODIPINE BESYLATE 2.5 MG/1
2.5 TABLET ORAL DAILY
Qty: 90 TABLET | Refills: 3 | Status: SHIPPED | OUTPATIENT
Start: 2024-04-09

## 2024-04-09 RX ORDER — CLOTRIMAZOLE AND BETAMETHASONE DIPROPIONATE 10; .64 MG/G; MG/G
CREAM TOPICAL
Qty: 15 G | Refills: 2 | Status: SHIPPED | OUTPATIENT
Start: 2024-04-09

## 2024-04-09 ASSESSMENT — ENCOUNTER SYMPTOMS
BACK PAIN: 1
DIARRHEA: 1
EYE ITCHING: 0
CHEST TIGHTNESS: 0
SHORTNESS OF BREATH: 0
CONSTIPATION: 0
ABDOMINAL PAIN: 0
RHINORRHEA: 1

## 2024-04-26 ENCOUNTER — TELEPHONE (OUTPATIENT)
Dept: FAMILY MEDICINE CLINIC | Age: 79
End: 2024-04-26

## 2024-04-26 NOTE — TELEPHONE ENCOUNTER
Patient called and the insurance will not cover the B-12 tablet she needs to have the shots. I have scheduled an appointment for 5/3/24. If this appointment is not needed please let her know she can be reached at [phone].     Please advise.

## 2024-04-30 SDOH — ECONOMIC STABILITY: TRANSPORTATION INSECURITY
IN THE PAST 12 MONTHS, HAS LACK OF TRANSPORTATION KEPT YOU FROM MEETINGS, WORK, OR FROM GETTING THINGS NEEDED FOR DAILY LIVING?: NO

## 2024-04-30 SDOH — ECONOMIC STABILITY: FOOD INSECURITY: WITHIN THE PAST 12 MONTHS, THE FOOD YOU BOUGHT JUST DIDN'T LAST AND YOU DIDN'T HAVE MONEY TO GET MORE.: NEVER TRUE

## 2024-04-30 SDOH — ECONOMIC STABILITY: FOOD INSECURITY: WITHIN THE PAST 12 MONTHS, YOU WORRIED THAT YOUR FOOD WOULD RUN OUT BEFORE YOU GOT MONEY TO BUY MORE.: NEVER TRUE

## 2024-04-30 SDOH — ECONOMIC STABILITY: HOUSING INSECURITY
IN THE LAST 12 MONTHS, WAS THERE A TIME WHEN YOU DID NOT HAVE A STEADY PLACE TO SLEEP OR SLEPT IN A SHELTER (INCLUDING NOW)?: NO

## 2024-04-30 SDOH — ECONOMIC STABILITY: INCOME INSECURITY: HOW HARD IS IT FOR YOU TO PAY FOR THE VERY BASICS LIKE FOOD, HOUSING, MEDICAL CARE, AND HEATING?: NOT HARD AT ALL

## 2024-05-03 ENCOUNTER — OFFICE VISIT (OUTPATIENT)
Dept: FAMILY MEDICINE CLINIC | Age: 79
End: 2024-05-03
Payer: MEDICARE

## 2024-05-03 DIAGNOSIS — E53.8 VITAMIN B 12 DEFICIENCY: Primary | ICD-10-CM

## 2024-05-03 PROCEDURE — 96372 THER/PROPH/DIAG INJ SC/IM: CPT | Performed by: FAMILY MEDICINE

## 2024-05-03 PROCEDURE — G8428 CUR MEDS NOT DOCUMENT: HCPCS | Performed by: FAMILY MEDICINE

## 2024-05-03 PROCEDURE — G8417 CALC BMI ABV UP PARAM F/U: HCPCS | Performed by: FAMILY MEDICINE

## 2024-05-03 PROCEDURE — 99211 OFF/OP EST MAY X REQ PHY/QHP: CPT | Performed by: FAMILY MEDICINE

## 2024-05-03 RX ORDER — CYANOCOBALAMIN 1000 UG/ML
1000 INJECTION, SOLUTION INTRAMUSCULAR; SUBCUTANEOUS ONCE
Status: COMPLETED | OUTPATIENT
Start: 2024-05-03 | End: 2024-05-03

## 2024-05-03 RX ADMIN — CYANOCOBALAMIN 1000 MCG: 1000 INJECTION, SOLUTION INTRAMUSCULAR; SUBCUTANEOUS at 15:01

## 2024-05-17 DIAGNOSIS — M19.90 ARTHRITIS: ICD-10-CM

## 2024-05-17 RX ORDER — MELOXICAM 15 MG/1
15 TABLET ORAL DAILY
Qty: 90 TABLET | Refills: 0 | Status: SHIPPED | OUTPATIENT
Start: 2024-05-17

## 2024-05-20 DIAGNOSIS — I10 ESSENTIAL HYPERTENSION: ICD-10-CM

## 2024-05-20 RX ORDER — AMLODIPINE BESYLATE 2.5 MG/1
2.5 TABLET ORAL DAILY
Qty: 90 TABLET | Refills: 0 | Status: SHIPPED | OUTPATIENT
Start: 2024-05-20

## 2024-06-03 ENCOUNTER — OFFICE VISIT (OUTPATIENT)
Dept: FAMILY MEDICINE CLINIC | Age: 79
End: 2024-06-03
Payer: MEDICARE

## 2024-06-03 VITALS — TEMPERATURE: 97.2 F

## 2024-06-03 DIAGNOSIS — E53.8 VITAMIN B 12 DEFICIENCY: Primary | ICD-10-CM

## 2024-06-03 PROCEDURE — 96372 THER/PROPH/DIAG INJ SC/IM: CPT | Performed by: NURSE PRACTITIONER

## 2024-06-03 RX ORDER — CYANOCOBALAMIN 1000 UG/ML
1000 INJECTION, SOLUTION INTRAMUSCULAR; SUBCUTANEOUS ONCE
Status: COMPLETED | OUTPATIENT
Start: 2024-06-03 | End: 2024-06-03

## 2024-06-03 RX ADMIN — CYANOCOBALAMIN 1000 MCG: 1000 INJECTION, SOLUTION INTRAMUSCULAR; SUBCUTANEOUS at 10:04

## 2024-06-03 NOTE — PROGRESS NOTES
Administrations This Visit       cyanocobalamin injection 1,000 mcg       Admin Date  06/03/2024  10:04 Action  Given Dose  1,000 mcg Route  IntraMUSCular Site  Deltoid Left Administered By  Sarah Pabon LPN    Ordering Provider: Catalina Porter APRN - NP    NDC: 84814-610-76    Lot#: [id number]    : [employer] [facility]    Patient Supplied?: No

## 2024-06-25 ENCOUNTER — OFFICE VISIT (OUTPATIENT)
Dept: SURGERY | Age: 79
End: 2024-06-25
Payer: MEDICARE

## 2024-06-25 ENCOUNTER — HOSPITAL ENCOUNTER (OUTPATIENT)
Dept: WOMENS IMAGING | Age: 79
Discharge: HOME | End: 2024-06-25
Payer: MEDICARE

## 2024-06-25 VITALS
WEIGHT: 199 LBS | BODY MASS INDEX: 33.97 KG/M2 | DIASTOLIC BLOOD PRESSURE: 84 MMHG | OXYGEN SATURATION: 94 % | HEART RATE: 64 BPM | HEIGHT: 64 IN | SYSTOLIC BLOOD PRESSURE: 142 MMHG

## 2024-06-25 VITALS — HEIGHT: 64 IN | BODY MASS INDEX: 33.29 KG/M2 | WEIGHT: 195 LBS

## 2024-06-25 DIAGNOSIS — Z85.3 ENCOUNTER FOR FOLLOW-UP SURVEILLANCE OF BREAST CANCER: Primary | ICD-10-CM

## 2024-06-25 DIAGNOSIS — Z85.3 HISTORY OF BREAST CANCER: ICD-10-CM

## 2024-06-25 DIAGNOSIS — Z08 ENCOUNTER FOR FOLLOW-UP SURVEILLANCE OF BREAST CANCER: Primary | ICD-10-CM

## 2024-06-25 DIAGNOSIS — Z85.3 ENCOUNTER FOR FOLLOW-UP SURVEILLANCE OF BREAST CANCER: ICD-10-CM

## 2024-06-25 DIAGNOSIS — I89.0 LYMPHEDEMA: Primary | ICD-10-CM

## 2024-06-25 DIAGNOSIS — Z08 ENCOUNTER FOR FOLLOW-UP SURVEILLANCE OF BREAST CANCER: ICD-10-CM

## 2024-06-25 DIAGNOSIS — Z12.31 ENCOUNTER FOR SCREENING MAMMOGRAM FOR MALIGNANT NEOPLASM OF BREAST: ICD-10-CM

## 2024-06-25 DIAGNOSIS — Z85.3 PERSONAL HISTORY OF BREAST CANCER: ICD-10-CM

## 2024-06-25 DIAGNOSIS — Z90.12 HISTORY OF LEFT MASTECTOMY: ICD-10-CM

## 2024-06-25 PROCEDURE — 1036F TOBACCO NON-USER: CPT | Performed by: NURSE PRACTITIONER

## 2024-06-25 PROCEDURE — G8417 CALC BMI ABV UP PARAM F/U: HCPCS | Performed by: NURSE PRACTITIONER

## 2024-06-25 PROCEDURE — 77063 BREAST TOMOSYNTHESIS BI: CPT

## 2024-06-25 PROCEDURE — 99213 OFFICE O/P EST LOW 20 MIN: CPT | Performed by: NURSE PRACTITIONER

## 2024-06-25 PROCEDURE — G8399 PT W/DXA RESULTS DOCUMENT: HCPCS | Performed by: NURSE PRACTITIONER

## 2024-06-25 PROCEDURE — G8427 DOCREV CUR MEDS BY ELIG CLIN: HCPCS | Performed by: NURSE PRACTITIONER

## 2024-06-25 PROCEDURE — 3079F DIAST BP 80-89 MM HG: CPT | Performed by: NURSE PRACTITIONER

## 2024-06-25 PROCEDURE — 1123F ACP DISCUSS/DSCN MKR DOCD: CPT | Performed by: NURSE PRACTITIONER

## 2024-06-25 PROCEDURE — 1090F PRES/ABSN URINE INCON ASSESS: CPT | Performed by: NURSE PRACTITIONER

## 2024-06-25 PROCEDURE — 3077F SYST BP >= 140 MM HG: CPT | Performed by: NURSE PRACTITIONER

## 2024-06-25 NOTE — PROGRESS NOTES
no acute bone or soft tissue abnormality.  Possible post-surgical pain musculoskeletal pain.  No concerning findings on U/S for pain.    - Personal History of Breast Cancer - s/p left mastectomy with maryann evaluation, s/p chemotherapy with Herceptin.  Did not undergo radiation or take antiestrogens.    - Encounter for follow-up surveillance of breast cancer  - Family History of Breast and Ovarian Cancer       PLAN:   Referral to PT  Continue annual clinical breast exam  Annual right screening mammogram due 6/2025  Signs/symptoms of recurrence were reviewed.  She verbalizes understanding that she should notify the office if she identifies any abnormalities on self evaluation.  Follow up cancer surveillance discussed   Discussed the importance of breast awareness including the importance and technique of self breast exams  Most recent imaging was reviewed, documented above, the results were discussed with the patient, all questions answered  Healthy Lifestyle Recommendations: healthy diet (decrease consumption of red meat, increase fresh fruits and vegetables), decreased alcohol consumption (less than 4 drinks/week), adequate sleep (goal 6-8 hours), routine exercise (goal 150 minutes/week or greater), weight control.          [facility]   Surgical Breast Oncology   [phone]    All of the patient's questions were answered at this time however, she was encouraged to call the office with any further inquiries.    Approximately 20 minutes of time were spent in preparation, direct patient contact, counseling, care coordination, documentation and activities otherwise related to this encounter.

## 2024-07-03 ENCOUNTER — OFFICE VISIT (OUTPATIENT)
Dept: FAMILY MEDICINE CLINIC | Age: 79
End: 2024-07-03
Payer: MEDICARE

## 2024-07-03 DIAGNOSIS — E55.9 VITAMIN D DEFICIENCY: Primary | ICD-10-CM

## 2024-07-03 PROCEDURE — G8417 CALC BMI ABV UP PARAM F/U: HCPCS | Performed by: FAMILY MEDICINE

## 2024-07-03 PROCEDURE — G8428 CUR MEDS NOT DOCUMENT: HCPCS | Performed by: FAMILY MEDICINE

## 2024-07-03 PROCEDURE — 96372 THER/PROPH/DIAG INJ SC/IM: CPT | Performed by: FAMILY MEDICINE

## 2024-07-03 RX ORDER — CYANOCOBALAMIN 1000 UG/ML
1000 INJECTION, SOLUTION INTRAMUSCULAR; SUBCUTANEOUS ONCE
Status: COMPLETED | OUTPATIENT
Start: 2024-07-03 | End: 2024-07-03

## 2024-07-03 RX ADMIN — CYANOCOBALAMIN 1000 MCG: 1000 INJECTION, SOLUTION INTRAMUSCULAR; SUBCUTANEOUS at 09:16

## 2024-07-08 DIAGNOSIS — F51.01 PRIMARY INSOMNIA: ICD-10-CM

## 2024-07-08 RX ORDER — CLONAZEPAM 1 MG/1
TABLET ORAL
Qty: 90 TABLET | Refills: 0 | Status: SHIPPED | OUTPATIENT
Start: 2024-07-08 | End: 2025-07-06

## 2024-07-08 NOTE — TELEPHONE ENCOUNTER
Medication:   Requested Prescriptions     Pending Prescriptions Disp Refills    clonazePAM (KLONOPIN) 1 MG tablet 90 tablet 0     Sig: TAKE 1 TABLET NIGHTLY AS   NEEDED FOR SLEEP      Last Filled:  3/22/24  Provider out of office.     Patient Phone Number: [phone] (home)     Last appt: 7/3/2024   Next appt: 8/13/2024    Last OARRS:       12/13/2019     8:08 PM   RX Monitoring   Periodic Controlled Substance Monitoring No signs of potential drug abuse or diversion identified.     PDMP Monitoring:    Last PDMP [place] Review[place] ([place]):  Review User Review Instant Review Result   ERENDIRA DAWN JR. 3/22/2024 12:06 PM Reviewed PDMP [1]     Preferred Pharmacy:   [facility] - [address] - [facility] [phone] - F [phone]  [facility]  [place] [address]  Phone: [phone] Fax: [phone]    [facility]/pharmacy #[id number] - [place], [place] - [address] [facility] [phone] - F [phone]  [address] [facility] [address]  Phone: [phone] Fax: [phone]

## 2024-07-08 NOTE — TELEPHONE ENCOUNTER
clonazePAM (KLONOPIN) 1 MG tablet      [facility] - [place] [facility] - P [phone] - F [phone]  [facility] [place] [address]  Phone: [phone]  Fax: [phone]

## 2024-08-06 LAB
A/G RATIO: 1.5 (ref 1–2)
ALBUMIN: 4.3 G/DL (ref 3.5–5.7)
ALP BLD-CCNC: 44 U/L (ref 34–104)
ALT SERPL-CCNC: 10 U/L (ref 7–52)
ANION GAP SERPL CALCULATED.3IONS-SCNC: 11 MMOL/L (ref 7–16)
AST SERPL-CCNC: 17 U/L (ref 13–39)
BASOPHILS ABSOLUTE: 0 K/UL (ref 0–0.1)
BASOPHILS RELATIVE PERCENT: 0.4 %
BILIRUB SERPL-MCNC: 0.7 MG/DL (ref 0.3–1)
BUN BLDV-MCNC: 25 MG/DL (ref 7–25)
CALCIUM SERPL-MCNC: 10.3 MG/DL (ref 8.6–10.2)
CHLORIDE BLD-SCNC: 110 MMOL/L (ref 98–107)
CHOLESTEROL, TOTAL: 180 MG/DL
CO2: 21 MMOL/L (ref 21–31)
CREAT SERPL-MCNC: 1.09 MG/DL (ref 0.6–1.2)
EGFR FEMALE: 51 ML/MIN/1.73M2
EOSINOPHILS ABSOLUTE: 0.1 K/UL (ref 0–0.4)
EOSINOPHILS RELATIVE PERCENT: 1.4 %
GLOBULIN: 2.9 G/DL (ref 2.6–4.2)
GLUCOSE BLD-MCNC: 113 MG/DL (ref 74–109)
HCT VFR BLD CALC: 38.8 % (ref 35.8–46.5)
HDLC SERPL-MCNC: 55 MG/DL (ref 40–60)
HEMOGLOBIN: 13 G/DL (ref 12.1–15.8)
LDL CHOLESTEROL: 89 MG/DL (ref 0–99)
LYMPHOCYTES ABSOLUTE: 1.7 K/UL (ref 0.8–3.6)
LYMPHOCYTES RELATIVE PERCENT: 27.7 %
MCH RBC QN AUTO: 30.7 PG (ref 28.4–33.4)
MCHC RBC AUTO-ENTMCNC: 33.5 G/DL (ref 31.1–37)
MCV RBC AUTO: 91.6 FL (ref 85–99)
MONOCYTES ABSOLUTE: 0.4 K/UL (ref 0.3–0.9)
MONOCYTES RELATIVE PERCENT: 6.7 %
NEUTROPHILS ABSOLUTE: 3.9 K/UL (ref 2–7.3)
NEUTROPHILS RELATIVE PERCENT: 63.8 %
PDW BLD-RTO: 13.5 % (ref 11.7–15.2)
PLATELET # BLD: 287 K/UL (ref 154–393)
POTASSIUM SERPL-SCNC: 4.6 MMOL/L (ref 3.5–5.1)
RBC # BLD: 4.24 M/UL (ref 3.86–5.17)
SODIUM BLD-SCNC: 142 MMOL/L (ref 136–145)
TOTAL PROTEIN: 7.2 G/DL (ref 6–8.3)
TRIGL SERPL-MCNC: 182 MG/DL
VLDLC SERPL CALC-MCNC: 36 MG/DL (ref 0–40)
WBC # BLD: 6.1 K/UL (ref 4–10.5)

## 2024-08-10 DIAGNOSIS — M19.90 ARTHRITIS: ICD-10-CM

## 2024-08-11 NOTE — PROGRESS NOTES
SUBJECTIVE:    Leonela Barrera is a 79 y.o. female who presents for a follow up visit.    Chief Complaint   Patient presents with    Follow-up        Hyperlipidemia  This is a chronic problem. The current episode started more than 1 year ago. Lipid results: Total cholesterol 180, triglycerides 182, HDL 55, LDL 89. Exacerbating diseases include obesity. Factors aggravating her hyperlipidemia include beta blockers and thiazides. Pertinent negatives include no chest pain or shortness of breath. Current antihyperlipidemic treatment includes statins. The current treatment provides significant improvement of lipids. Compliance problems include adherence to exercise and adherence to diet.  Risk factors for coronary artery disease include dyslipidemia, obesity, hypertension, a sedentary lifestyle and post-menopausal.   Hypertension  This is a chronic problem. The current episode started more than 1 year ago. The problem is controlled. Pertinent negatives include no chest pain, palpitations or shortness of breath. Agents associated with hypertension include NSAIDs. Risk factors for coronary artery disease include obesity, sedentary lifestyle, dyslipidemia and post-menopausal state. Past treatments include beta blockers, diuretics and calcium channel blockers.   Gastroesophageal Reflux  She complains of heartburn. She reports no chest pain. This is a chronic problem. The current episode started more than 1 year ago. The problem occurs rarely. The problem has been waxing and waning. The heartburn is located in the substernum. The heartburn is of mild intensity. The heartburn does not wake her from sleep. The heartburn does not limit her activity. The heartburn doesn't change with position. The symptoms are aggravated by certain foods. Associated symptoms include fatigue and orthopnea. Risk factors include obesity and NSAIDs. She has tried a PPI for the symptoms. The treatment provided significant relief.        Patient's

## 2024-08-12 RX ORDER — MELOXICAM 15 MG/1
15 TABLET ORAL DAILY
Qty: 90 TABLET | Refills: 0 | Status: SHIPPED | OUTPATIENT
Start: 2024-08-12

## 2024-08-12 NOTE — TELEPHONE ENCOUNTER
Medication:   Requested Prescriptions     Pending Prescriptions Disp Refills    meloxicam (MOBIC) 15 MG tablet [Pharmacy Med Name: MELOXICAM TAB 15MG] 90 tablet 0     Sig: TAKE 1 TABLET DAILY      Provider out of office.     Patient Phone Number: [phone] (home)     Last appt: 7/3/2024   Next appt: 8/13/2024    Last OARRS:       12/13/2019     8:08 PM   RX Monitoring   Periodic Controlled Substance Monitoring No signs of potential drug abuse or diversion identified.     PDMP Monitoring:    Last PDMP [place] Reviewed ([place]):  Review User Review Instant Review Result   TIERRA YOUNG 7/8/2024 10:52 AM Reviewed PDMP [1]     Preferred Pharmacy:   [facility] - [address] - [facility] [phone] - F [phone]  [facility]  [place] [address]  Phone: [phone] Fax: [phone]    [facility]/pharmacy #[id number] - [place], [place] - [address] [facility] [phone] - F [phone]  [address] [facility] [address]  Phone: [phone] Fax: [phone]

## 2024-08-13 ENCOUNTER — OFFICE VISIT (OUTPATIENT)
Dept: FAMILY MEDICINE CLINIC | Age: 79
End: 2024-08-13

## 2024-08-13 VITALS
TEMPERATURE: 97.2 F | SYSTOLIC BLOOD PRESSURE: 139 MMHG | HEART RATE: 59 BPM | BODY MASS INDEX: 33.81 KG/M2 | OXYGEN SATURATION: 94 % | WEIGHT: 197 LBS | DIASTOLIC BLOOD PRESSURE: 73 MMHG

## 2024-08-13 DIAGNOSIS — F41.9 ANXIETY: ICD-10-CM

## 2024-08-13 DIAGNOSIS — I10 ESSENTIAL HYPERTENSION: Primary | ICD-10-CM

## 2024-08-13 DIAGNOSIS — E53.8 B12 DEFICIENCY: ICD-10-CM

## 2024-08-13 DIAGNOSIS — R73.9 HYPERGLYCEMIA: ICD-10-CM

## 2024-08-13 DIAGNOSIS — F51.01 PRIMARY INSOMNIA: ICD-10-CM

## 2024-08-13 DIAGNOSIS — Z85.3 PERSONAL HISTORY OF MALIGNANT NEOPLASM OF BREAST: ICD-10-CM

## 2024-08-13 DIAGNOSIS — E78.2 MIXED HYPERLIPIDEMIA: ICD-10-CM

## 2024-08-13 RX ORDER — CYANOCOBALAMIN 1000 UG/ML
1000 INJECTION, SOLUTION INTRAMUSCULAR; SUBCUTANEOUS ONCE
Status: COMPLETED | OUTPATIENT
Start: 2024-08-13 | End: 2024-08-13

## 2024-08-13 RX ADMIN — CYANOCOBALAMIN 1000 MCG: 1000 INJECTION, SOLUTION INTRAMUSCULAR; SUBCUTANEOUS at 09:19

## 2024-08-13 ASSESSMENT — ENCOUNTER SYMPTOMS
SHORTNESS OF BREATH: 0
DIARRHEA: 0
RHINORRHEA: 1
CONSTIPATION: 0
BACK PAIN: 1
CHEST TIGHTNESS: 0

## 2024-09-11 ENCOUNTER — OFFICE VISIT (OUTPATIENT)
Dept: FAMILY MEDICINE CLINIC | Age: 79
End: 2024-09-11

## 2024-09-11 VITALS
HEART RATE: 62 BPM | BODY MASS INDEX: 33.81 KG/M2 | SYSTOLIC BLOOD PRESSURE: 124 MMHG | DIASTOLIC BLOOD PRESSURE: 70 MMHG | TEMPERATURE: 98 F | WEIGHT: 197 LBS | OXYGEN SATURATION: 95 %

## 2024-09-11 DIAGNOSIS — R07.89 OTHER CHEST PAIN: ICD-10-CM

## 2024-09-11 DIAGNOSIS — Z85.3 PERSONAL HISTORY OF MALIGNANT NEOPLASM OF BREAST: ICD-10-CM

## 2024-09-11 DIAGNOSIS — E53.8 B12 DEFICIENCY: Primary | ICD-10-CM

## 2024-09-11 RX ORDER — PREDNISONE 10 MG/1
TABLET ORAL
Qty: 30 TABLET | Refills: 0 | Status: SHIPPED | OUTPATIENT
Start: 2024-09-11

## 2024-09-11 RX ORDER — CYANOCOBALAMIN 1000 UG/ML
1000 INJECTION, SOLUTION INTRAMUSCULAR; SUBCUTANEOUS ONCE
Status: COMPLETED | OUTPATIENT
Start: 2024-09-11 | End: 2024-09-11

## 2024-09-11 RX ADMIN — CYANOCOBALAMIN 1000 MCG: 1000 INJECTION, SOLUTION INTRAMUSCULAR; SUBCUTANEOUS at 10:01

## 2024-09-11 ASSESSMENT — ENCOUNTER SYMPTOMS
BACK PAIN: 1
SHORTNESS OF BREATH: 0

## 2024-09-19 ENCOUNTER — TELEPHONE (OUTPATIENT)
Dept: FAMILY MEDICINE CLINIC | Age: 79
End: 2024-09-19

## 2024-09-19 DIAGNOSIS — R06.02 SOB (SHORTNESS OF BREATH): Primary | ICD-10-CM

## 2024-09-20 ENCOUNTER — TELEPHONE (OUTPATIENT)
Dept: FAMILY MEDICINE CLINIC | Age: 79
End: 2024-09-20

## 2024-09-24 DIAGNOSIS — R06.02 SOB (SHORTNESS OF BREATH): Primary | ICD-10-CM

## 2024-09-25 DIAGNOSIS — F51.01 PRIMARY INSOMNIA: ICD-10-CM

## 2024-09-26 RX ORDER — CLONAZEPAM 1 MG/1
TABLET ORAL
Qty: 90 TABLET | Refills: 0 | Status: SHIPPED | OUTPATIENT
Start: 2024-09-26 | End: 2024-10-26

## 2024-10-05 DIAGNOSIS — I10 ESSENTIAL HYPERTENSION: ICD-10-CM

## 2024-10-07 RX ORDER — AMLODIPINE BESYLATE 2.5 MG/1
2.5 TABLET ORAL DAILY
Qty: 90 TABLET | Refills: 0 | Status: SHIPPED | OUTPATIENT
Start: 2024-10-07

## 2024-10-07 RX ORDER — ALENDRONATE SODIUM 70 MG/1
TABLET ORAL
Qty: 12 TABLET | Refills: 1 | Status: SHIPPED | OUTPATIENT
Start: 2024-10-07

## 2024-10-30 ENCOUNTER — TELEPHONE (OUTPATIENT)
Dept: SURGERY | Age: 79
End: 2024-10-30

## 2024-10-30 NOTE — TELEPHONE ENCOUNTER
Chuck calling from [employer] wanting clinical notes with diagnosis of patient Lymphedema pls fax to [phone] any questions call Chuck back @ [phone]

## 2024-11-13 ENCOUNTER — TELEPHONE (OUTPATIENT)
Dept: SURGERY | Age: 79
End: 2024-11-13

## 2024-11-13 DIAGNOSIS — Z90.12 HISTORY OF LEFT MASTECTOMY: ICD-10-CM

## 2024-11-13 DIAGNOSIS — Z85.3 HISTORY OF BREAST CANCER: Primary | ICD-10-CM

## 2024-11-13 PROCEDURE — L8001 BREAST PROSTHESIS BRA & FORM: HCPCS | Performed by: NURSE PRACTITIONER

## 2024-11-13 NOTE — TELEPHONE ENCOUNTER
Patient called would like a new prescription for bra/prothesis [facility].  Please Fax to [phone]  Thank you

## 2024-12-30 NOTE — PATIENT INSTRUCTIONS
Ok, we can also place referral to [facility] for PT in the home meals using beans and peas. Add garbanzo or kidney beans to salads. Make burritos and tacos with mashed membreno beans or black beans. Where can you learn more? Go to https://david.[email or web address]. org and sign in to your [employer] account. Enter A266 in the [facility] box to learn more about \"DASH Diet: Care Instructions. \"     If you do not have an account, please click on the \"Sign Up Now\" link. Current as of: December 6, 2017  Content Version: 11.7  © 4648-4751 [employer]. Care instructions adapted under license by [employer] [facility] ([facility]). If you have questions about a medical condition or this instruction, always ask your healthcare professional. [address] any warranty or liability for your use of this information. Patient Education        Back Pain: Care Instructions  Your Care Instructions    Back pain has many possible causes. It is often related to problems with muscles and ligaments of the back. It may also be related to problems with the nerves, discs, or bones of the back. Moving, lifting, standing, sitting, or sleeping in an awkward way can strain the back. Sometimes you don't notice the injury until later. Arthritis is another common cause of back pain. Although it may hurt a lot, back pain usually improves on its own within several weeks. Most people recover in 12 weeks or less. Using good home treatment and being careful not to stress your back can help you feel better sooner. Follow-up care is a key part of your treatment and safety. Be sure to make and go to all appointments, and call your doctor if you are having problems. It's also a good idea to know your test results and keep a list of the medicines you take. How can you care for yourself at home? · Sit or lie in positions that are most comfortable and reduce your pain.  Try one of these positions when you lie down:  ¨ Lie on your back with your knees bent and supported by large

## 2025-01-04 DIAGNOSIS — I10 ESSENTIAL HYPERTENSION: ICD-10-CM

## 2025-01-06 RX ORDER — AMLODIPINE BESYLATE 2.5 MG/1
2.5 TABLET ORAL DAILY
Qty: 90 TABLET | Refills: 0 | Status: SHIPPED | OUTPATIENT
Start: 2025-01-06

## (undated) DEVICE — SOLUTION IRRIG 3000ML 0.9% SOD CHL USP UROMATIC PLAS CONT

## (undated) DEVICE — SET ENDOSCP SEAL HYSTEROSCOPE RIG OUTFLO CHN DISP MYOSURE

## (undated) DEVICE — LIGHT HANDLE: Brand: DEVON

## (undated) DEVICE — GLOVE ORANGE PI 8   MSG9080

## (undated) DEVICE — DRAPE,REIN 53X77,STERILE: Brand: MEDLINE

## (undated) DEVICE — D & C-LF: Brand: MEDLINE INDUSTRIES, INC.

## (undated) DEVICE — SET EXTN PRIMING 4.9ML L30IN INCL SLDE CLMP SPIN M LUERLOCK

## (undated) DEVICE — SET FLD MGMT CTRL SYS INFLO TB AQUILEX

## (undated) DEVICE — DEVICE TISS REM DIA3MM L25.25IN ENDOSCP F/ IU POLYPS

## (undated) DEVICE — CYSTO/BLADDER IRRIGATION SET, REGULATING CLAMP

## (undated) DEVICE — SYSTEM COLL W/ TISS TRAP INCLUDE COLL CANSTR LID SET OF

## (undated) DEVICE — TUBING EXTENSION ST

## (undated) DEVICE — SHEET,DRAPE,53X77,STERILE: Brand: MEDLINE

## (undated) DEVICE — BOWL MED L 32OZ PLAS W/ MOLD GRAD EZ OPN PEEL PCH

## (undated) DEVICE — SET FLD MGMT OUTFLO TB DISP FOR CTRL SYS AQUILEX

## (undated) DEVICE — SOLUTION IRRIG 500ML 0.9% SOD CHL USP POUR PLAS BTL

## (undated) DEVICE — MERCY FAIRFIELD TURNOVER KIT: Brand: MEDLINE INDUSTRIES, INC.

## (undated) DEVICE — GLOVE SURG SZ 65 THK91MIL LTX FREE SYN POLYISOPRENE